# Patient Record
Sex: FEMALE | Race: WHITE | NOT HISPANIC OR LATINO | Employment: OTHER | URBAN - METROPOLITAN AREA
[De-identification: names, ages, dates, MRNs, and addresses within clinical notes are randomized per-mention and may not be internally consistent; named-entity substitution may affect disease eponyms.]

---

## 2023-04-21 LAB
APPEARANCE, URINE: ABNORMAL
BACTERIA, URINE: ABNORMAL /HPF
BILIRUBIN, URINE: NEGATIVE
BLOOD, URINE: ABNORMAL
COLOR, URINE: YELLOW
GLUCOSE, URINE: NEGATIVE MG/DL
KETONES, URINE: NEGATIVE MG/DL
LEUKOCYTE ESTERASE, URINE: ABNORMAL
NITRITE, URINE: NEGATIVE
PH, URINE: 7 (ref 5–8)
PROTEIN, URINE: ABNORMAL MG/DL
RBC, URINE: 31 /HPF (ref 0–5)
SPECIFIC GRAVITY, URINE: 1.01 (ref 1–1.03)
SQUAMOUS EPITHELIAL CELLS, URINE: 6 /HPF
UROBILINOGEN, URINE: <2 MG/DL (ref 0–1.9)
WBC CLUMPS, URINE: ABNORMAL /HPF
WBC, URINE: >182 /HPF (ref 0–5)

## 2023-04-22 LAB — URINE CULTURE: ABNORMAL

## 2023-08-18 ENCOUNTER — HOSPITAL ENCOUNTER (OUTPATIENT)
Dept: DATA CONVERSION | Facility: HOSPITAL | Age: 73
End: 2023-08-18

## 2023-08-18 DIAGNOSIS — M62.81 MUSCLE WEAKNESS (GENERALIZED): ICD-10-CM

## 2023-08-23 ENCOUNTER — HOSPITAL ENCOUNTER (OUTPATIENT)
Dept: DATA CONVERSION | Facility: HOSPITAL | Age: 73
Discharge: HOME | End: 2023-08-23
Payer: MEDICARE

## 2023-08-23 DIAGNOSIS — E11.22 TYPE 2 DIABETES MELLITUS WITH DIABETIC CHRONIC KIDNEY DISEASE (MULTI): ICD-10-CM

## 2023-08-23 DIAGNOSIS — Z99.2 DEPENDENCE ON RENAL DIALYSIS (CMS-HCC): ICD-10-CM

## 2023-08-23 DIAGNOSIS — I12.0 HYPERTENSIVE CHRONIC KIDNEY DISEASE WITH STAGE 5 CHRONIC KIDNEY DISEASE OR END STAGE RENAL DISEASE (MULTI): ICD-10-CM

## 2023-08-23 DIAGNOSIS — N18.6 END STAGE RENAL DISEASE (MULTI): ICD-10-CM

## 2023-08-23 DIAGNOSIS — E11.42 TYPE 2 DIABETES MELLITUS WITH DIABETIC POLYNEUROPATHY (MULTI): ICD-10-CM

## 2023-08-23 LAB
ANION GAP SERPL CALCULATED.3IONS-SCNC: 10 MMOL/L (ref 0–19)
ANTICOAGULANT: ABNORMAL
BASOPHILS # BLD AUTO: 0.06 K/UL (ref 0–0.22)
BASOPHILS NFR BLD AUTO: 1.4 % (ref 0–1)
BUN SERPL-MCNC: 9 MG/DL (ref 8–25)
BUN/CREAT SERPL: 4.1 RATIO (ref 8–21)
CALCIUM SERPL-MCNC: 8.5 MG/DL (ref 8.5–10.4)
CHLORIDE SERPL-SCNC: 98 MMOL/L (ref 97–107)
CO2 SERPL-SCNC: 26 MMOL/L (ref 24–31)
CREAT SERPL-MCNC: 2.2 MG/DL (ref 0.4–1.6)
DEPRECATED RDW RBC AUTO: 58.8 FL (ref 37–54)
DIFFERENTIAL METHOD BLD: ABNORMAL
EOSINOPHIL # BLD AUTO: 0.13 K/UL (ref 0–0.45)
EOSINOPHIL NFR BLD: 3.1 % (ref 0–3)
ERYTHROCYTE [DISTWIDTH] IN BLOOD BY AUTOMATED COUNT: 16.2 % (ref 11.7–15)
GFR SERPL CREATININE-BSD FRML MDRD: 23 ML/MIN/1.73 M2
GLUCOSE BLD STRIP.AUTO-MCNC: 154 MG/DL (ref 65–99)
GLUCOSE SERPL-MCNC: 166 MG/DL (ref 65–99)
HCT VFR BLD AUTO: 36.8 % (ref 36–44)
HGB BLD-MCNC: 11.8 GM/DL (ref 12–15)
IMM GRANULOCYTES # BLD AUTO: 0.01 K/UL (ref 0–0.1)
INR PPP: 1.3 (ref 0.86–1.16)
LYMPHOCYTES # BLD AUTO: 2.26 K/UL (ref 1.2–3.2)
LYMPHOCYTES NFR BLD MANUAL: 54.3 % (ref 20–40)
MCH RBC QN AUTO: 32.2 PG (ref 26–34)
MCHC RBC AUTO-ENTMCNC: 32.1 % (ref 31–37)
MCV RBC AUTO: 100.5 FL (ref 80–100)
MONOCYTES # BLD AUTO: 0.44 K/UL (ref 0–0.8)
MONOCYTES NFR BLD MANUAL: 10.6 % (ref 0–8)
NEUTROPHILS # BLD AUTO: 1.26 K/UL
NEUTROPHILS # BLD AUTO: 1.26 K/UL (ref 1.8–7.7)
NEUTROPHILS.IMMATURE NFR BLD: 0.2 % (ref 0–1)
NEUTS SEG NFR BLD: 30.4 % (ref 50–70)
NRBC BLD-RTO: 1 /100 WBC
PLATELET # BLD AUTO: 136 K/UL (ref 150–450)
PMV BLD AUTO: 9.8 CU (ref 7–12.6)
POTASSIUM SERPL-SCNC: 3.4 MMOL/L (ref 3.4–5.1)
PROTHROMBIN TIME: 13.5 SEC (ref 9.3–12.7)
RBC # BLD AUTO: 3.66 M/UL (ref 4–4.9)
SODIUM SERPL-SCNC: 134 MMOL/L (ref 133–145)
WBC # BLD AUTO: 4.2 K/UL (ref 4.5–11)

## 2023-09-19 LAB
ALBUMIN SERPL-MCNC: 3 GM/DL (ref 3.5–5)
ALBUMIN/GLOB SERPL: 1 RATIO (ref 1.5–3)
ALP BLD-CCNC: 221 U/L (ref 35–125)
ALT SERPL-CCNC: 44 U/L (ref 5–40)
ANION GAP SERPL CALCULATED.3IONS-SCNC: 10 MMOL/L (ref 0–19)
ANTICOAGULANT: ABNORMAL
AST SERPL-CCNC: 148 U/L (ref 5–40)
BASOPHILS # BLD AUTO: 0.04 K/UL (ref 0–0.22)
BASOPHILS NFR BLD AUTO: 1 % (ref 0–1)
BILIRUB DIRECT SERPL-MCNC: 0.6 MG/DL (ref 0–0.2)
BILIRUB INDIRECT SERPL-MCNC: 0.4 MG/DL (ref 0–0.8)
BILIRUB SERPL-MCNC: 1 MG/DL (ref 0.1–1.2)
BUN SERPL-MCNC: 13 MG/DL (ref 8–25)
BUN/CREAT SERPL: 8.1 RATIO (ref 8–21)
CALCIUM SERPL-MCNC: 8.8 MG/DL (ref 8.5–10.4)
CHLORIDE SERPL-SCNC: 99 MMOL/L (ref 97–107)
CO2 SERPL-SCNC: 27 MMOL/L (ref 24–31)
CREAT SERPL-MCNC: 1.6 MG/DL (ref 0.4–1.6)
CRP SERPL-MCNC: 1.7 MG/DL (ref 0–2)
DEPRECATED RDW RBC AUTO: 50.1 FL (ref 37–54)
DIFFERENTIAL METHOD BLD: ABNORMAL
EOSINOPHIL # BLD AUTO: 0.05 K/UL (ref 0–0.45)
EOSINOPHIL NFR BLD: 1.3 % (ref 0–3)
ERYTHROCYTE [DISTWIDTH] IN BLOOD BY AUTOMATED COUNT: 14.5 % (ref 11.7–15)
GFR SERPL CREATININE-BSD FRML MDRD: 34 ML/MIN/1.73 M2
GLOBULIN SER-MCNC: 3.1 G/DL (ref 1.9–3.7)
GLUCOSE SERPL-MCNC: 266 MG/DL (ref 65–99)
HCT VFR BLD AUTO: 39.9 % (ref 36–44)
HGB BLD-MCNC: 13.3 GM/DL (ref 12–15)
HS TROPONIN T DELTA: 2 (ref 0–4)
HS TROPONIN T DELTA: ABNORMAL (ref 0–4)
IMM GRANULOCYTES # BLD AUTO: 0.01 K/UL (ref 0–0.1)
INR PPP: 2.2 (ref 0.86–1.16)
LIPASE SERPL-CCNC: 16 U/L (ref 16–63)
LYMPHOCYTES # BLD AUTO: 0.94 K/UL (ref 1.2–3.2)
LYMPHOCYTES NFR BLD MANUAL: 24.1 % (ref 20–40)
MCH RBC QN AUTO: 31.7 PG (ref 26–34)
MCHC RBC AUTO-ENTMCNC: 33.3 % (ref 31–37)
MCV RBC AUTO: 95 FL (ref 80–100)
MONOCYTES # BLD AUTO: 0.37 K/UL (ref 0–0.8)
MONOCYTES NFR BLD MANUAL: 9.5 % (ref 0–8)
NEUTROPHILS # BLD AUTO: 2.49 K/UL
NEUTROPHILS # BLD AUTO: 2.49 K/UL (ref 1.8–7.7)
NEUTROPHILS.IMMATURE NFR BLD: 0.3 % (ref 0–1)
NEUTS SEG NFR BLD: 63.8 % (ref 50–70)
NRBC BLD-RTO: 0 /100 WBC
PLATELET # BLD AUTO: 144 K/UL (ref 150–450)
PMV BLD AUTO: 9.9 CU (ref 7–12.6)
POTASSIUM SERPL-SCNC: 3.8 MMOL/L (ref 3.4–5.1)
PROT SERPL-MCNC: 6.1 G/DL (ref 5.9–7.9)
PROTHROMBIN TIME: 22.1 SEC (ref 9.3–12.7)
RBC # BLD AUTO: 4.2 M/UL (ref 4–4.9)
SODIUM SERPL-SCNC: 136 MMOL/L (ref 133–145)
TROPONIN T SERPL-MCNC: 92 NG/L
TROPONIN T SERPL-MCNC: 94 NG/L
WBC # BLD AUTO: 3.9 K/UL (ref 4.5–11)

## 2023-09-20 ENCOUNTER — HOSPITAL ENCOUNTER (OUTPATIENT)
Dept: DATA CONVERSION | Facility: HOSPITAL | Age: 73
Discharge: HOME | End: 2023-09-22
Payer: MEDICARE

## 2023-09-20 DIAGNOSIS — R10.84 GENERALIZED ABDOMINAL PAIN: ICD-10-CM

## 2023-09-20 DIAGNOSIS — E87.70 FLUID OVERLOAD, UNSPECIFIED: ICD-10-CM

## 2023-09-20 DIAGNOSIS — Z79.01 LONG TERM (CURRENT) USE OF ANTICOAGULANTS: ICD-10-CM

## 2023-09-20 DIAGNOSIS — Z99.2 DEPENDENCE ON RENAL DIALYSIS (CMS-HCC): ICD-10-CM

## 2023-09-20 DIAGNOSIS — J44.9 CHRONIC OBSTRUCTIVE PULMONARY DISEASE, UNSPECIFIED (MULTI): ICD-10-CM

## 2023-09-20 DIAGNOSIS — Z88.2 ALLERGY STATUS TO SULFONAMIDES: ICD-10-CM

## 2023-09-20 DIAGNOSIS — D63.1 ANEMIA IN CHRONIC KIDNEY DISEASE (CODE): ICD-10-CM

## 2023-09-20 DIAGNOSIS — E11.22 TYPE 2 DIABETES MELLITUS WITH DIABETIC CHRONIC KIDNEY DISEASE (MULTI): ICD-10-CM

## 2023-09-20 DIAGNOSIS — K59.00 CONSTIPATION, UNSPECIFIED: ICD-10-CM

## 2023-09-20 DIAGNOSIS — Z95.5 PRESENCE OF CORONARY ANGIOPLASTY IMPLANT AND GRAFT: ICD-10-CM

## 2023-09-20 DIAGNOSIS — I12.0 HYPERTENSIVE CHRONIC KIDNEY DISEASE WITH STAGE 5 CHRONIC KIDNEY DISEASE OR END STAGE RENAL DISEASE (MULTI): ICD-10-CM

## 2023-09-20 DIAGNOSIS — N18.6 END STAGE RENAL DISEASE (MULTI): ICD-10-CM

## 2023-09-20 DIAGNOSIS — I25.10 ATHEROSCLEROTIC HEART DISEASE OF NATIVE CORONARY ARTERY WITHOUT ANGINA PECTORIS: ICD-10-CM

## 2023-09-20 DIAGNOSIS — R33.8 OTHER RETENTION OF URINE: ICD-10-CM

## 2023-09-20 DIAGNOSIS — R79.89 OTHER SPECIFIED ABNORMAL FINDINGS OF BLOOD CHEMISTRY: ICD-10-CM

## 2023-09-20 DIAGNOSIS — Z79.899 OTHER LONG TERM (CURRENT) DRUG THERAPY: ICD-10-CM

## 2023-09-20 DIAGNOSIS — R07.9 CHEST PAIN, UNSPECIFIED: ICD-10-CM

## 2023-09-20 LAB
ALBUMIN SERPL-MCNC: 2.7 GM/DL (ref 3.5–5)
ALBUMIN/GLOB SERPL: 0.9 RATIO (ref 1.5–3)
ALP BLD-CCNC: 266 U/L (ref 35–125)
ALT SERPL-CCNC: 363 U/L (ref 5–40)
ANION GAP SERPL CALCULATED.3IONS-SCNC: 11 MMOL/L (ref 0–19)
AST SERPL-CCNC: 777 U/L (ref 5–40)
BILIRUB SERPL-MCNC: 1 MG/DL (ref 0.1–1.2)
BUN SERPL-MCNC: 20 MG/DL (ref 8–25)
BUN/CREAT SERPL: 9.1 RATIO (ref 8–21)
CALCIUM SERPL-MCNC: 8.7 MG/DL (ref 8.5–10.4)
CHLORIDE SERPL-SCNC: 100 MMOL/L (ref 97–107)
CO2 SERPL-SCNC: 23 MMOL/L (ref 24–31)
CREAT SERPL-MCNC: 2.2 MG/DL (ref 0.4–1.6)
GFR SERPL CREATININE-BSD FRML MDRD: 23 ML/MIN/1.73 M2
GLOBULIN SER-MCNC: 2.9 G/DL (ref 1.9–3.7)
GLUCOSE BLD STRIP.AUTO-MCNC: 140 MG/DL (ref 65–99)
GLUCOSE BLD STRIP.AUTO-MCNC: 191 MG/DL (ref 65–99)
GLUCOSE BLD STRIP.AUTO-MCNC: 198 MG/DL (ref 65–99)
GLUCOSE SERPL-MCNC: 227 MG/DL (ref 65–99)
HAV IGM SER QL: NORMAL
HBA1C MFR BLD: 5.5 % (ref 4–6)
HBV CORE IGM SER QL: NORMAL
HBV SURFACE AG SER QL: NORMAL
HCV AB SER QL: NORMAL
POTASSIUM SERPL-SCNC: 4.1 MMOL/L (ref 3.4–5.1)
PROT SERPL-MCNC: 5.6 G/DL (ref 5.9–7.9)
SODIUM SERPL-SCNC: 134 MMOL/L (ref 133–145)

## 2023-09-21 LAB
ALBUMIN SERPL-MCNC: 2.2 GM/DL (ref 3.5–5)
ALBUMIN/GLOB SERPL: 1 RATIO (ref 1.5–3)
ALP BLD-CCNC: 190 U/L (ref 35–125)
ALT SERPL-CCNC: 129 U/L (ref 5–40)
ANION GAP SERPL CALCULATED.3IONS-SCNC: 10 MMOL/L (ref 0–19)
ANTICOAGULANT: ABNORMAL
AST SERPL-CCNC: 95 U/L (ref 5–40)
BASOPHILS # BLD AUTO: 0.06 K/UL (ref 0–0.22)
BASOPHILS NFR BLD AUTO: 1.5 % (ref 0–1)
BILIRUB SERPL-MCNC: 0.4 MG/DL (ref 0.1–1.2)
BUN SERPL-MCNC: 33 MG/DL (ref 8–25)
BUN/CREAT SERPL: 11.8 RATIO (ref 8–21)
CALCIUM SERPL-MCNC: 7 MG/DL (ref 8.5–10.4)
CHLORIDE SERPL-SCNC: 105 MMOL/L (ref 97–107)
CO2 SERPL-SCNC: 20 MMOL/L (ref 24–31)
CREAT SERPL-MCNC: 2.8 MG/DL (ref 0.4–1.6)
DEPRECATED RDW RBC AUTO: 53.9 FL (ref 37–54)
DIFFERENTIAL METHOD BLD: ABNORMAL
EOSINOPHIL # BLD AUTO: 0.08 K/UL (ref 0–0.45)
EOSINOPHIL NFR BLD: 2 % (ref 0–3)
ERYTHROCYTE [DISTWIDTH] IN BLOOD BY AUTOMATED COUNT: 14.6 % (ref 11.7–15)
GFR SERPL CREATININE-BSD FRML MDRD: 17 ML/MIN/1.73 M2
GLOBULIN SER-MCNC: 2.3 G/DL (ref 1.9–3.7)
GLUCOSE BLD STRIP.AUTO-MCNC: 106 MG/DL (ref 65–99)
GLUCOSE BLD STRIP.AUTO-MCNC: 139 MG/DL (ref 65–99)
GLUCOSE BLD STRIP.AUTO-MCNC: 192 MG/DL (ref 65–99)
GLUCOSE BLD STRIP.AUTO-MCNC: 271 MG/DL (ref 65–99)
GLUCOSE SERPL-MCNC: 137 MG/DL (ref 65–99)
HCT VFR BLD AUTO: 34.9 % (ref 36–44)
HGB BLD-MCNC: 11.5 GM/DL (ref 12–15)
IMM GRANULOCYTES # BLD AUTO: 0.02 K/UL (ref 0–0.1)
INR PPP: 2.5 (ref 0.86–1.16)
LYMPHOCYTES # BLD AUTO: 2.18 K/UL (ref 1.2–3.2)
LYMPHOCYTES NFR BLD MANUAL: 53.7 % (ref 20–40)
MAGNESIUM SERPL-MCNC: 1.7 MG/DL (ref 1.6–3.1)
MCH RBC QN AUTO: 32.7 PG (ref 26–34)
MCHC RBC AUTO-ENTMCNC: 33 % (ref 31–37)
MCV RBC AUTO: 99.1 FL (ref 80–100)
MONOCYTES # BLD AUTO: 0.38 K/UL (ref 0–0.8)
MONOCYTES NFR BLD MANUAL: 9.4 % (ref 0–8)
NEUTROPHILS # BLD AUTO: 1.34 K/UL
NEUTROPHILS # BLD AUTO: 1.34 K/UL (ref 1.8–7.7)
NEUTROPHILS.IMMATURE NFR BLD: 0.5 % (ref 0–1)
NEUTS SEG NFR BLD: 32.9 % (ref 50–70)
NRBC BLD-RTO: 0 /100 WBC
PLATELET # BLD AUTO: 128 K/UL (ref 150–450)
PMV BLD AUTO: 10.4 CU (ref 7–12.6)
POTASSIUM SERPL-SCNC: 3.3 MMOL/L (ref 3.4–5.1)
PROT SERPL-MCNC: 4.5 G/DL (ref 5.9–7.9)
PROTHROMBIN TIME: 25.4 SEC (ref 9.3–12.7)
RBC # BLD AUTO: 3.52 M/UL (ref 4–4.9)
SODIUM SERPL-SCNC: 135 MMOL/L (ref 133–145)
WBC # BLD AUTO: 4.1 K/UL (ref 4.5–11)

## 2023-09-22 LAB — GLUCOSE BLD STRIP.AUTO-MCNC: 185 MG/DL (ref 65–99)

## 2023-09-27 ENCOUNTER — HOSPITAL ENCOUNTER (OUTPATIENT)
Dept: DATA CONVERSION | Facility: HOSPITAL | Age: 73
End: 2023-09-27
Payer: MEDICARE

## 2023-10-08 PROBLEM — M79.7 FIBROMYALGIA: Status: ACTIVE | Noted: 2023-10-08

## 2023-10-08 PROBLEM — E03.9 HYPOTHYROID: Status: ACTIVE | Noted: 2023-10-08

## 2023-10-08 PROBLEM — I25.10 ATHEROSCLEROSIS OF CORONARY ARTERY: Status: ACTIVE | Noted: 2023-10-08

## 2023-10-08 PROBLEM — M19.131: Status: ACTIVE | Noted: 2023-10-08

## 2023-10-08 PROBLEM — H90.3 BILATERAL SENSORINEURAL HEARING LOSS: Status: ACTIVE | Noted: 2023-10-08

## 2023-10-08 PROBLEM — E66.9 OBESITY: Status: ACTIVE | Noted: 2023-10-08

## 2023-10-08 PROBLEM — R60.9 CHRONIC EDEMA: Status: ACTIVE | Noted: 2023-10-08

## 2023-10-08 PROBLEM — D63.8 ANEMIA OF CHRONIC DISEASE: Status: ACTIVE | Noted: 2023-10-08

## 2023-10-08 PROBLEM — M19.079 ANKLE ARTHRITIS: Status: ACTIVE | Noted: 2023-10-08

## 2023-10-08 PROBLEM — E87.5 HYPERKALEMIA: Status: ACTIVE | Noted: 2023-10-08

## 2023-10-08 PROBLEM — L90.0 LICHEN SCLEROSUS: Status: ACTIVE | Noted: 2023-10-08

## 2023-10-08 PROBLEM — M25.531 RIGHT WRIST PAIN: Status: ACTIVE | Noted: 2023-10-08

## 2023-10-08 PROBLEM — K92.0 HEMATEMESIS: Status: ACTIVE | Noted: 2023-10-08

## 2023-10-08 PROBLEM — R35.1 NOCTURIA: Status: ACTIVE | Noted: 2023-10-08

## 2023-10-08 PROBLEM — G43.109 COMPLICATED MIGRAINE: Status: ACTIVE | Noted: 2023-10-08

## 2023-10-08 PROBLEM — M54.50 LUMBAR PAIN: Status: ACTIVE | Noted: 2023-10-08

## 2023-10-08 PROBLEM — Z85.72 HISTORY OF NON-HODGKIN'S LYMPHOMA: Status: ACTIVE | Noted: 2023-10-08

## 2023-10-08 PROBLEM — Z95.5 HISTORY OF CORONARY ARTERY STENT PLACEMENT: Status: ACTIVE | Noted: 2023-10-08

## 2023-10-08 PROBLEM — R10.2 FEMALE PELVIC PAIN: Status: ACTIVE | Noted: 2023-10-08

## 2023-10-08 PROBLEM — R55 SYNCOPE: Status: ACTIVE | Noted: 2023-10-08

## 2023-10-08 PROBLEM — I10 BENIGN ESSENTIAL HYPERTENSION: Status: ACTIVE | Noted: 2023-10-08

## 2023-10-08 PROBLEM — I48.20 CHRONIC ATRIAL FIBRILLATION (MULTI): Status: ACTIVE | Noted: 2023-10-08

## 2023-10-08 PROBLEM — R41.82 ALTERED MENTAL STATUS: Status: ACTIVE | Noted: 2023-10-08

## 2023-10-08 PROBLEM — E87.8 ELECTROLYTE IMBALANCE: Status: ACTIVE | Noted: 2023-10-08

## 2023-10-08 PROBLEM — R31.9 HEMATURIA: Status: ACTIVE | Noted: 2023-10-08

## 2023-10-08 PROBLEM — R10.13 EPIGASTRIC PAIN: Status: ACTIVE | Noted: 2023-10-08

## 2023-10-08 PROBLEM — I16.0 HYPERTENSIVE URGENCY: Status: ACTIVE | Noted: 2023-10-08

## 2023-10-08 PROBLEM — E61.1 IRON DEFICIENCY: Status: ACTIVE | Noted: 2023-10-08

## 2023-10-08 PROBLEM — M31.6 TEMPORAL ARTERITIS (MULTI): Status: ACTIVE | Noted: 2023-10-08

## 2023-10-08 PROBLEM — S80.00XA CONTUSION OF KNEE: Status: ACTIVE | Noted: 2023-10-08

## 2023-10-08 PROBLEM — S82.853A CLOSED TRIMALLEOLAR FRACTURE: Status: ACTIVE | Noted: 2023-10-08

## 2023-10-08 PROBLEM — R06.00 DYSPNEA: Status: ACTIVE | Noted: 2023-10-08

## 2023-10-08 PROBLEM — N95.2 VAGINAL ATROPHY: Status: ACTIVE | Noted: 2023-10-08

## 2023-10-08 PROBLEM — G40.909 SEIZURE DISORDER (MULTI): Status: ACTIVE | Noted: 2023-10-08

## 2023-10-08 PROBLEM — R07.89 CHEST DISCOMFORT: Status: ACTIVE | Noted: 2023-10-08

## 2023-10-08 PROBLEM — K52.9 COLITIS: Status: ACTIVE | Noted: 2023-10-08

## 2023-10-08 PROBLEM — S89.90XA INJURY OF LOWER EXTREMITY: Status: ACTIVE | Noted: 2023-10-08

## 2023-10-08 PROBLEM — E78.5 HYPERLIPIDEMIA: Status: ACTIVE | Noted: 2023-10-08

## 2023-10-08 PROBLEM — R39.9 LOWER URINARY TRACT SYMPTOMS (LUTS): Status: ACTIVE | Noted: 2023-10-08

## 2023-10-08 PROBLEM — R60.0 LOWER LEG EDEMA: Status: ACTIVE | Noted: 2023-10-08

## 2023-10-08 PROBLEM — E43 SEVERE PROTEIN-CALORIE MALNUTRITION (GOMEZ: LESS THAN 60% OF STANDARD WEIGHT) (MULTI): Status: ACTIVE | Noted: 2023-10-08

## 2023-10-08 PROBLEM — E87.1 HYPONATREMIA: Status: ACTIVE | Noted: 2023-10-08

## 2023-10-08 PROBLEM — I50.9 HEART FAILURE (MULTI): Status: ACTIVE | Noted: 2023-10-08

## 2023-10-08 PROBLEM — Z86.718 HISTORY OF DEEP VEIN THROMBOSIS: Status: ACTIVE | Noted: 2023-10-08

## 2023-10-08 PROBLEM — R94.31 ELECTROCARDIOGRAM ABNORMAL: Status: ACTIVE | Noted: 2023-10-08

## 2023-10-08 PROBLEM — I20.9 ANGINA PECTORIS (CMS-HCC): Status: ACTIVE | Noted: 2023-10-08

## 2023-10-08 PROBLEM — I63.9 CEREBROVASCULAR ACCIDENT (CVA) (MULTI): Status: ACTIVE | Noted: 2023-10-08

## 2023-10-08 PROBLEM — E83.42 HYPOMAGNESEMIA: Status: ACTIVE | Noted: 2023-10-08

## 2023-10-08 PROBLEM — R53.1 ASTHENIA: Status: ACTIVE | Noted: 2023-10-08

## 2023-10-08 PROBLEM — N18.5 CHRONIC KIDNEY DISEASE, STAGE 5 (MULTI): Status: ACTIVE | Noted: 2023-10-08

## 2023-10-08 PROBLEM — K21.9 GASTROESOPHAGEAL REFLUX DISEASE: Status: ACTIVE | Noted: 2023-10-08

## 2023-10-08 PROBLEM — N81.89 PELVIC FLOOR WEAKNESS: Status: ACTIVE | Noted: 2023-10-08

## 2023-10-08 PROBLEM — E11.22: Status: ACTIVE | Noted: 2023-10-08

## 2023-10-08 RX ORDER — GABAPENTIN 100 MG/1
100 CAPSULE ORAL 2 TIMES DAILY
COMMUNITY

## 2023-10-08 RX ORDER — ACETAMINOPHEN 325 MG/1
650 TABLET ORAL EVERY 6 HOURS PRN
COMMUNITY

## 2023-10-08 RX ORDER — NYSTATIN 100000 U/G
1 CREAM TOPICAL 2 TIMES DAILY
COMMUNITY
End: 2024-01-15 | Stop reason: SDUPTHER

## 2023-10-08 RX ORDER — FUROSEMIDE 40 MG/1
40 TABLET ORAL DAILY
COMMUNITY

## 2023-10-08 RX ORDER — EVOLOCUMAB 140 MG/ML
140 INJECTION, SOLUTION SUBCUTANEOUS
COMMUNITY
Start: 2021-09-14

## 2023-10-08 RX ORDER — FLUCONAZOLE 150 MG/1
150 TABLET ORAL ONCE
COMMUNITY
Start: 2022-02-10 | End: 2023-10-16 | Stop reason: ALTCHOICE

## 2023-10-08 RX ORDER — WARFARIN 2.5 MG/1
2.5 TABLET ORAL
COMMUNITY

## 2023-10-08 RX ORDER — CARVEDILOL 25 MG/1
50 TABLET ORAL
COMMUNITY

## 2023-10-08 RX ORDER — WARFARIN SODIUM 5 MG/1
5 TABLET ORAL
COMMUNITY

## 2023-10-08 RX ORDER — KETOCONAZOLE 20 MG/G
CREAM TOPICAL 2 TIMES DAILY
COMMUNITY
Start: 2021-02-17

## 2023-10-08 RX ORDER — LEVETIRACETAM 500 MG/1
TABLET ORAL
COMMUNITY
Start: 2021-09-21

## 2023-10-08 RX ORDER — INSULIN LISPRO 100 [IU]/ML
INJECTION, SOLUTION INTRAVENOUS; SUBCUTANEOUS
COMMUNITY
Start: 2020-12-28

## 2023-10-08 RX ORDER — BUTALBITAL, ACETAMINOPHEN AND CAFFEINE 50; 325; 40 MG/1; MG/1; MG/1
TABLET ORAL
COMMUNITY
Start: 2021-08-17

## 2023-10-08 RX ORDER — HYDRALAZINE HYDROCHLORIDE 10 MG/1
30 TABLET, FILM COATED ORAL 2 TIMES DAILY
COMMUNITY

## 2023-10-08 RX ORDER — NITROFURANTOIN 25; 75 MG/1; MG/1
100 CAPSULE ORAL EVERY 12 HOURS
COMMUNITY
Start: 2017-05-07 | End: 2023-10-16 | Stop reason: ALTCHOICE

## 2023-10-08 RX ORDER — CLOBETASOL PROPIONATE 0.5 MG/G
CREAM TOPICAL
COMMUNITY
Start: 2021-09-16 | End: 2023-10-16 | Stop reason: SDUPTHER

## 2023-10-08 RX ORDER — ATORVASTATIN CALCIUM 40 MG/1
40 TABLET, FILM COATED ORAL DAILY
COMMUNITY

## 2023-10-08 RX ORDER — FUROSEMIDE 20 MG/1
20 TABLET ORAL DAILY
COMMUNITY

## 2023-10-08 RX ORDER — NITROGLYCERIN 80 MG/1
PATCH TRANSDERMAL
COMMUNITY
Start: 2021-05-28

## 2023-10-08 RX ORDER — INSULIN GLARGINE 100 [IU]/ML
10 INJECTION, SOLUTION SUBCUTANEOUS NIGHTLY
COMMUNITY

## 2023-10-08 RX ORDER — ISOSORBIDE MONONITRATE 30 MG/1
30 TABLET, EXTENDED RELEASE ORAL DAILY
COMMUNITY
Start: 2022-07-19

## 2023-10-08 RX ORDER — NIACIN 50 MG
50 TABLET ORAL DAILY
COMMUNITY

## 2023-10-08 RX ORDER — LAMOTRIGINE 100 MG/1
100 TABLET ORAL 2 TIMES DAILY
COMMUNITY
Start: 2021-08-17

## 2023-10-08 RX ORDER — TORSEMIDE 100 MG/1
0.5 TABLET ORAL DAILY
COMMUNITY
Start: 2022-01-12

## 2023-10-08 RX ORDER — PANTOPRAZOLE SODIUM 40 MG/1
40 TABLET, DELAYED RELEASE ORAL DAILY
COMMUNITY

## 2023-10-08 RX ORDER — HYDRALAZINE HYDROCHLORIDE 50 MG/1
2 TABLET, FILM COATED ORAL 3 TIMES DAILY
COMMUNITY

## 2023-10-08 RX ORDER — HYDRALAZINE HYDROCHLORIDE 25 MG/1
TABLET, FILM COATED ORAL
COMMUNITY
Start: 2021-02-25

## 2023-10-08 RX ORDER — LEVOTHYROXINE SODIUM 75 UG/1
75 TABLET ORAL
COMMUNITY
Start: 2012-08-17

## 2023-10-08 RX ORDER — ESTRADIOL 10 UG/1
10 INSERT VAGINAL 3 TIMES WEEKLY
COMMUNITY
Start: 2022-03-04 | End: 2024-03-27 | Stop reason: SDUPTHER

## 2023-10-08 RX ORDER — CARVEDILOL 12.5 MG/1
12.5 TABLET ORAL 2 TIMES DAILY
COMMUNITY
Start: 2021-07-14

## 2023-10-08 RX ORDER — OXYCODONE AND ACETAMINOPHEN 5; 325 MG/1; MG/1
1 TABLET ORAL EVERY 6 HOURS PRN
COMMUNITY

## 2023-10-08 RX ORDER — CEFADROXIL 500 MG/1
CAPSULE ORAL
COMMUNITY
Start: 2021-10-11

## 2023-10-08 RX ORDER — METOPROLOL TARTRATE 25 MG/1
25 TABLET, FILM COATED ORAL 2 TIMES DAILY
COMMUNITY

## 2023-10-11 ENCOUNTER — DOCUMENTATION (OUTPATIENT)
Dept: PHYSICAL THERAPY | Facility: CLINIC | Age: 73
End: 2023-10-11
Payer: MEDICARE

## 2023-10-13 ENCOUNTER — APPOINTMENT (OUTPATIENT)
Dept: AUDIOLOGY | Facility: CLINIC | Age: 73
End: 2023-10-13

## 2023-10-20 ENCOUNTER — APPOINTMENT (OUTPATIENT)
Dept: RADIOLOGY | Facility: HOSPITAL | Age: 73
End: 2023-10-20
Payer: MEDICARE

## 2023-10-20 ENCOUNTER — APPOINTMENT (OUTPATIENT)
Dept: AUDIOLOGY | Facility: CLINIC | Age: 73
End: 2023-10-20

## 2023-10-27 ENCOUNTER — CLINICAL SUPPORT (OUTPATIENT)
Dept: AUDIOLOGY | Facility: CLINIC | Age: 73
End: 2023-10-27

## 2023-10-27 DIAGNOSIS — H90.3 SENSORINEURAL HEARING LOSS (SNHL) OF BOTH EARS: Primary | ICD-10-CM

## 2023-10-27 PROCEDURE — HRANC PR HEARING AID NO CHARGE: Performed by: AUDIOLOGIST

## 2023-10-27 NOTE — PROGRESS NOTES
Hearing Aid Evaluation  Time of Appointment:  Chief Complaint   Patient presents with    Hearing Loss       This patient was seen for an HAE. The patient has a benefit through [facility].    Demonstrated Phonak Audeo hearing aids in the office today.  The patient was pleased with the sound and fit.    Order placed today: 2 PHONAK AUDEO M 30 13 IN P3 COLOR , #1 LENGTH AND SMALL VENTED DOMES TO START. PATIENT HAS SMALL CANALS    The patient paid NO CHARGE for today's visit. SHE HAS WORN [employer] IN PAST BUT DID NOT WEAR THEM MUCH. SHE IS USING A WHEELCHAIR AND KNOWS TO CALL FOR THE FITTING AND FOLLOW UP APPT.    Return for hearing aid fitting that was scheduled today. PATIENT WILL CALL [facility] FOR AN APPT.     HEARING AID FITTING    Time of Appointment:

## 2023-11-08 ENCOUNTER — DOCUMENTATION (OUTPATIENT)
Dept: AUDIOLOGY | Facility: CLINIC | Age: 73
End: 2023-11-08
Payer: MEDICARE

## 2023-11-08 NOTE — PROGRESS NOTES
Patient called in 11/6 and cancelled hearing aid order. Hearing aids were received from [facility] 11/6 and will be returned to [employer] and on [facility] portal.

## 2023-11-21 ENCOUNTER — DOCUMENTATION (OUTPATIENT)
Dept: PHYSICAL THERAPY | Facility: CLINIC | Age: 73
End: 2023-11-21
Payer: MEDICARE

## 2023-11-21 NOTE — PROGRESS NOTES
Physical Therapy    Discharge Summary    Name: Eli Zavala  MRN: [id number]  : 1950  Date: 23    Discharge Summary: PT    Discharge Information: Date of discharge 23      Rehab Discharge Reason: Achieved all and/or the most significant goals(s)

## 2024-01-02 NOTE — PROGRESS NOTES
"Subjective   Patient ID: Eli Zavala is a 73 y.o. female who presents for vaginal irritation of micturition.    HPI  72-year-old with significant vaginal atrophy and vaginal erythema, concerns for lichen sclerosis, pelvic floor weakness and pain, nocturia and history of daytime urgency, chronic kidney disease with microscopic hematuria now on 3 times a week hemodialysis.     The patient presents with complaints of  vaginal burning , she has been utilizing Yuvafem tablets once a week with 6-7 hours relief. She is also utilizing clobetasol cream twice weekly. She is noting burning and when she voids it burn and states \"like her vagina is on fire\".     She denies any vaginal bleeding or discharge.     She denies any bowel related complaints, no fecal or flatal incontinence.    She has no other complaints.    From Previous note  This visit was performed through telemedicine  72-year-old with significant vaginal atrophy and vaginal erythema, concerns for lichen sclerosis, pelvic floor weakness and pain, nocturia and daytime urgency, chronic kidney disease with microscopic hematuria now on 3 times a week hemodialysis.    The patient has not started utilizing the Gemtesa therapy, she continues to note a burning sensation even with a little amount of urine associated with burning micturition. She notes 1-2 episodes of nocturia and once during the day.  She utilizes oil around her vagina which improves the burning.  She states that the burning is not bladder but rather external vaginal tissue concerns.    She denies any vaginal bleeding or discharge. She has been utilizing the Yuvafem tablets.     She denies any bowel related complaints, no fecal or flatal incontinence.    She has no other complaints.      From Previous note   72-year-old with significant vaginal atrophy and vaginal erythema, concerns for lichen sclerosis, pelvic floor weakness and pain, nocturia and daytime urgency, chronic kidney disease with microscopic " hematuria.     The patient was last seen in April 2023. She is on hemodialysis thrice a week for her kidney failure, she has small volume voids. She does have a sensation of bladder fullness but has difficulty emptying her bladder, she notes burning when she has this sensation of fullness. She denies any burning micturition or dysuria. She denies any UTI symptoms.     She denies any vaginal complaints, no abnormal vaginal bleeding or discharge.     She denies any bowel related complaints, no fecal or flatal incontinence.     She has no other complaints.     From previous note   72-year-old with significant vaginal atrophy and vaginal erythema, concerns for lichen sclerosis, pelvic floor weakness and pain, nocturia and daytime urgency, chronic kidney disease with microscopic hematuria.     The patient is not utilizing the vaginal estrogen tablets, however she did utilize the clobetasol therapy daily for 2 weeks and continues to utilize it twice a week thereafter. She denies any vaginal itching or irritation. She denies any abnormal vaginal bleeding or discharge.      She denies any urinary frequency, but reports of burning micturition and dysuria for the past 1 month. She denies any other UTI like symptoms. It was discussed that she cannot utilize AZO for her symptoms due to her poor kidney function.     She denies any bowel related complaints, no fecal or flatal incontinence.     She has no other complaints     From previous note  72-year-old with significant vaginal atrophy and vaginal erythema, concerns for lichen sclerosis, pelvic floor weakness and pain, nocturia and daytime urgency, chronic kidney disease with microscopic hematuria.     The patient was last seen in April 2022. She states she has seen recurrence of her vaginal symptoms for the past 2 weeks. She complaints of vaginal itching and irritation. She has not used the clobetasol cream for the past 6 months.      She denies any lower urinary tract  "complaints.     She denies any bowel related complaints, no fecal or flatal incontinence.     She has no other complaints.        From previous note  71-year-old with significant vaginal atrophy and vaginal erythema, pelvic floor weakness and pain, nocturia and mild daytime urgency, and chronic kidney disease with microscopic hematuria.     The patient is noted significant improvements in her vaginal irritation complaints. She is utilizing nystatin ointment and powder. She did not take her fluconazole as the \"nystatin was working\". She otherwise denies any abnormal vaginal bleeding or discharge. She is not utilizing her vaginal estrogen therapy.     She feels her bladder is \"working better\". She never utilized her Gemtesa therapy. She does note 2-3 episodes of nocturia which is not bothersome to her and notes daytime frequency roughly every 3 hours. She denies urge related incontinence. She does not desire any therapy for her bladder at this time.     We have previously discussed her voiding diary. This demonstrates a bladder capacity of 300 cc. There is no evidence of nocturnal polyuria. The patient does have 3 episodes of nocturia at low volumes roughly 100 cc. She is noted to have daytime frequency every hour to 2 hours. The patient is drinking roughly 1 L of water daily.     She has no other complaints.        From previous note  70-year-old presenting as referral from Dr. Yun with complaints of urinary frequency, nocturia, and vaginal and bladder pain.     Patient was followed by a urologist roughly 2 years ago and started on estrogen cream at that time. She is used that sporadically but is no longer using it at this time. She notes a roughly 4-month history of worsening urinary frequency and nocturia complaints with bladder pain. She notes pain \"all the time\" and significant irritation to the vaginal tissues. She denies any abnormal discharge or bleeding. She notes daytime frequency roughly every 3 hours. " However she states that she is up every hour at night with low volumes and pain. She feels that she does not empty her bladder appropriately and has to strain significantly which sometimes causes a bowel movement. She denies enuresis. She denies any urinary leakage.     She did see infectious disease 3 months ago and was started on doxycycline for unclear reasons. She is no longer taking this medication.     She does have episodic constipation and takes MiraLAX for this. She otherwise denies any fecal or flatal incontinence.     She is not sexually active. She denies any bulge complaints.     She has no other complaints   Review of Systems  Constitutional: No fever, No chills and No fatigue.   Eyes: No vision problems and No dryness of the eyes.   ENT: No dry mouth, No hearing loss and No nosebleeds.   Cardiovascular: No chest pain, No palpitations and No orthopnea.   Respiratory: No shortness of breath, No cough and No wheezing.   Gastrointestinal: No abdominal pain, No constipation, No nausea, No diarrhea, No vomiting and No melena.   Genitourinary: As noted in HPI.   Musculoskeletal: No back pain, No myalgias, No muscle weakness, No joint swelling and No leg edema.   Integumentary: No rashes, No skin lesion and No itching.   Neurological: No headache, No numbness and No dizziness.   Psychiatric: No sleep disturbances, No anxiety and No depression.   Endocrine: No hot flashes, No loss of hair and No hirsutism.   Hematologic/Lymphatic: No swollen glands, No tendency for easy bleeding and No tendency for easy bruising.   All other systems have been reviewed and are negative for complaint.        Objective   Physical Exam    PHYSICAL EXAMINATION:  No LMP recorded.  There is no height or weight on file to calculate BMI.  There were no vitals taken for this visit.  General Appearance: well appearing  Neuro: Alert and oriented   HEENT: mucous membranes moist, neck supple  Resp: No respiratory distress, normal work of  "breathing  MSK: normal range of motion, gait appropriate  Wheelchair-bound        Assessment/Plan      72-year-old with significant vaginal atrophy and vaginal erythema, concerns for lichen sclerosis, pelvic floor weakness and pain, nocturia and history of daytime urgency, chronic kidney disease with microscopic hematuria now on 3 times a week hemodialysis.     1. We again discussed the patient's vaginal complaints. She has had significant irritation associated with her vaginal estrogen cream. She appears to be having excellent results with her twice weekly Vagifem tablets. She is also utilizing vaginal olive oil to help with irritative complaints with success. She will continue her nystatin cream and ointment as necessary.  She will also continue her clobetasol therapy twice a week moving forward.     2. We again discussed the patient's lower urinary tract complaints. She has noted significant decreased urination associated with her recent hemodialysis but does note bladder \"burning\" with fullness. She unfortunately has allergies to sulfa and fluoroquinolones and has poor kidney function. We have previously discussed her voiding diary which demonstrated a low capacity hypersensitive bladder with no evidence of nocturnal polyuria. We have previously discussed the [facility] care pathway including first, second, third line therapies. She unfortunately was intolerant of Myrbetriq as this made her feel unwell. She took Gemtesa once and felt this caused urinary retention but this was during the time of her UTI.  We discussed a trial of phenazopyridine.  Given her hemodialysis, this will be a safe option for her.    3. The patient has been noted to have microscopic hematuria in the past that is likely related to her significant vaginal atrophy and irritation complaints. She denies any gross hematuria complaints. She is presently on hemodialysis.     4. Patient will follow-up in 1 week using her phenazopyridine to discuss " her lower urinary tract symptoms and pelvic and bladder burning complaints.     DC Rizzo MD        Scribe Attestation  By signing my name below, I, Shiv Maher attest that this documentation has been prepared under the direction and in the presence of Hola Rizzo MD. All medical record entries made by the Scribe were at my direction or personally dictated by me. I have reviewed the chart and agree that the record accurately reflects my personal performance of the history, physical exam, discussion and plan.

## 2024-01-05 ENCOUNTER — OFFICE VISIT (OUTPATIENT)
Dept: UROLOGY | Facility: CLINIC | Age: 74
End: 2024-01-05
Payer: MEDICARE

## 2024-01-05 VITALS — DIASTOLIC BLOOD PRESSURE: 63 MMHG | HEART RATE: 68 BPM | SYSTOLIC BLOOD PRESSURE: 164 MMHG

## 2024-01-05 DIAGNOSIS — Z99.2 HEMODIALYSIS PATIENT (CMS-HCC): ICD-10-CM

## 2024-01-05 DIAGNOSIS — R39.89 BLADDER PAIN: ICD-10-CM

## 2024-01-05 DIAGNOSIS — R10.2 FEMALE PELVIC PAIN: ICD-10-CM

## 2024-01-05 DIAGNOSIS — N95.2 VAGINAL ATROPHY: ICD-10-CM

## 2024-01-05 DIAGNOSIS — L90.0 LICHEN SCLEROSUS: ICD-10-CM

## 2024-01-05 DIAGNOSIS — N76.1 CHRONIC VAGINITIS: Primary | ICD-10-CM

## 2024-01-05 PROCEDURE — 1036F TOBACCO NON-USER: CPT | Performed by: OBSTETRICS & GYNECOLOGY

## 2024-01-05 PROCEDURE — 99213 OFFICE O/P EST LOW 20 MIN: CPT | Performed by: OBSTETRICS & GYNECOLOGY

## 2024-01-05 PROCEDURE — 3077F SYST BP >= 140 MM HG: CPT | Performed by: OBSTETRICS & GYNECOLOGY

## 2024-01-05 PROCEDURE — 3078F DIAST BP <80 MM HG: CPT | Performed by: OBSTETRICS & GYNECOLOGY

## 2024-01-05 PROCEDURE — 3066F NEPHROPATHY DOC TX: CPT | Performed by: OBSTETRICS & GYNECOLOGY

## 2024-01-05 RX ORDER — PHENAZOPYRIDINE HYDROCHLORIDE 100 MG/1
100 TABLET, FILM COATED ORAL DAILY
Qty: 20 TABLET | Refills: 0 | Status: SHIPPED | OUTPATIENT
Start: 2024-01-05 | End: 2024-01-15 | Stop reason: SDUPTHER

## 2024-01-05 NOTE — PATIENT INSTRUCTIONS
Please follow-up in 1 week virtually to discuss her lower urinary tract symptoms and burning.      Please use your phenazopyridine that I have just called into her pharmacy daily moving forward.  This will cause your urine to turn orange.    [phone]

## 2024-02-23 ENCOUNTER — APPOINTMENT (OUTPATIENT)
Dept: UROLOGY | Facility: CLINIC | Age: 74
End: 2024-02-23
Payer: MEDICARE

## 2024-02-28 NOTE — PROGRESS NOTES
"Subjective   Patient ID: Eli Zavala is a 73 y.o. female who presents for vaginal irritation of micturition.    HPI  72-year-old with significant vaginal atrophy and vaginal erythema, concerns for lichen sclerosis, pelvic floor weakness and pain, nocturia and history of daytime urgency, chronic kidney disease with microscopic hematuria now on 3 times a week hemodialysis.    She reports burning today that is only present when she feels the urge to void. This is localized to the outside of her vagina and not in the bladder. She adds that she uses Yuvafem twice a week this has been helping. She has been using olive oil as well but this does not help.  She does continue her clobetasol 2-3 times weekly as well.  She denies any abnormal vaginal discharge.    She is continuing to follow-up with hemodialysis and notes very rare urinary symptoms but significant pain while she urinates \"a few drops\".    She denies any bowel related complaints, no fecal or flatal incontinence.    She has no other complaints.      From Previous note  This visit was performed through telemedicine  72-year-old with significant vaginal atrophy and vaginal erythema, concerns for lichen sclerosis, pelvic floor weakness and pain, nocturia and history of daytime urgency, chronic kidney disease with microscopic hematuria now on 3 times a week hemodialysis.    The patient utilized phenazopyridine once a day and it helped her bladder burning and discomfort complaints. She stopped it yesterday and the burning and discomfort returned.     She recently got OTC Monostat but has started utilizing it. She wishes for a prescription for nystatin.    She denies any vaginal bleeding or discharge.     She denies any bowel related complaints, no fecal or flatal incontinence.    She has no other complaints.    From Previous note  72-year-old with significant vaginal atrophy and vaginal erythema, concerns for lichen sclerosis, pelvic floor weakness and pain, " "nocturia and history of daytime urgency, chronic kidney disease with microscopic hematuria now on 3 times a week hemodialysis.     The patient presents with complaints of  vaginal burning , she has been utilizing Yuvafem tablets once a week with 6-7 hours relief. She is also utilizing clobetasol cream twice weekly. She is noting burning and when she voids it burn and states \"like her vagina is on fire\".     She denies any vaginal bleeding or discharge.     She denies any bowel related complaints, no fecal or flatal incontinence.    She has no other complaints.    From Previous note  This visit was performed through telemedicine  72-year-old with significant vaginal atrophy and vaginal erythema, concerns for lichen sclerosis, pelvic floor weakness and pain, nocturia and daytime urgency, chronic kidney disease with microscopic hematuria now on 3 times a week hemodialysis.    The patient has not started utilizing the Gemtesa therapy, she continues to note a burning sensation even with a little amount of urine associated with burning micturition. She notes 1-2 episodes of nocturia and once during the day.  She utilizes oil around her vagina which improves the burning.  She states that the burning is not bladder but rather external vaginal tissue concerns.    She denies any vaginal bleeding or discharge. She has been utilizing the Yuvafem tablets.     She denies any bowel related complaints, no fecal or flatal incontinence.    She has no other complaints.      From Previous note   72-year-old with significant vaginal atrophy and vaginal erythema, concerns for lichen sclerosis, pelvic floor weakness and pain, nocturia and daytime urgency, chronic kidney disease with microscopic hematuria.     The patient was last seen in April 2023. She is on hemodialysis thrice a week for her kidney failure, she has small volume voids. She does have a sensation of bladder fullness but has difficulty emptying her bladder, she notes " burning when she has this sensation of fullness. She denies any burning micturition or dysuria. She denies any UTI symptoms.     She denies any vaginal complaints, no abnormal vaginal bleeding or discharge.     She denies any bowel related complaints, no fecal or flatal incontinence.     She has no other complaints.     From previous note   72-year-old with significant vaginal atrophy and vaginal erythema, concerns for lichen sclerosis, pelvic floor weakness and pain, nocturia and daytime urgency, chronic kidney disease with microscopic hematuria.     The patient is not utilizing the vaginal estrogen tablets, however she did utilize the clobetasol therapy daily for 2 weeks and continues to utilize it twice a week thereafter. She denies any vaginal itching or irritation. She denies any abnormal vaginal bleeding or discharge.      She denies any urinary frequency, but reports of burning micturition and dysuria for the past 1 month. She denies any other UTI like symptoms. It was discussed that she cannot utilize AZO for her symptoms due to her poor kidney function.     She denies any bowel related complaints, no fecal or flatal incontinence.     She has no other complaints     From previous note  72-year-old with significant vaginal atrophy and vaginal erythema, concerns for lichen sclerosis, pelvic floor weakness and pain, nocturia and daytime urgency, chronic kidney disease with microscopic hematuria.     The patient was last seen in April 2022. She states she has seen recurrence of her vaginal symptoms for the past 2 weeks. She complaints of vaginal itching and irritation. She has not used the clobetasol cream for the past 6 months.      She denies any lower urinary tract complaints.     She denies any bowel related complaints, no fecal or flatal incontinence.     She has no other complaints.        From previous note  71-year-old with significant vaginal atrophy and vaginal erythema, pelvic floor weakness and pain,  "nocturia and mild daytime urgency, and chronic kidney disease with microscopic hematuria.     The patient is noted significant improvements in her vaginal irritation complaints. She is utilizing nystatin ointment and powder. She did not take her fluconazole as the \"nystatin was working\". She otherwise denies any abnormal vaginal bleeding or discharge. She is not utilizing her vaginal estrogen therapy.     She feels her bladder is \"working better\". She never utilized her Gemtesa therapy. She does note 2-3 episodes of nocturia which is not bothersome to her and notes daytime frequency roughly every 3 hours. She denies urge related incontinence. She does not desire any therapy for her bladder at this time.     We have previously discussed her voiding diary. This demonstrates a bladder capacity of 300 cc. There is no evidence of nocturnal polyuria. The patient does have 3 episodes of nocturia at low volumes roughly 100 cc. She is noted to have daytime frequency every hour to 2 hours. The patient is drinking roughly 1 L of water daily.     She has no other complaints.        From previous note  70-year-old presenting as referral from Dr. Yun with complaints of urinary frequency, nocturia, and vaginal and bladder pain.     Patient was followed by a urologist roughly 2 years ago and started on estrogen cream at that time. She is used that sporadically but is no longer using it at this time. She notes a roughly 4-month history of worsening urinary frequency and nocturia complaints with bladder pain. She notes pain \"all the time\" and significant irritation to the vaginal tissues. She denies any abnormal discharge or bleeding. She notes daytime frequency roughly every 3 hours. However she states that she is up every hour at night with low volumes and pain. She feels that she does not empty her bladder appropriately and has to strain significantly which sometimes causes a bowel movement. She denies enuresis. She denies any " urinary leakage.     She did see infectious disease 3 months ago and was started on doxycycline for unclear reasons. She is no longer taking this medication.     She does have episodic constipation and takes MiraLAX for this. She otherwise denies any fecal or flatal incontinence.     She is not sexually active. She denies any bulge complaints.     She has no other complaints   Review of Systems  Constitutional: No fever, No chills and No fatigue.   Eyes: No vision problems and No dryness of the eyes.   ENT: No dry mouth, No hearing loss and No nosebleeds.   Cardiovascular: No chest pain, No palpitations and No orthopnea.   Respiratory: No shortness of breath, No cough and No wheezing.   Gastrointestinal: No abdominal pain, No constipation, No nausea, No diarrhea, No vomiting and No melena.   Genitourinary: As noted in HPI.   Musculoskeletal: No back pain, No myalgias, No muscle weakness, No joint swelling and No leg edema.   Integumentary: No rashes, No skin lesion and No itching.   Neurological: No headache, No numbness and No dizziness.   Psychiatric: No sleep disturbances, No anxiety and No depression.   Endocrine: No hot flashes, No loss of hair and No hirsutism.   Hematologic/Lymphatic: No swollen glands, No tendency for easy bleeding and No tendency for easy bruising.   All other systems have been reviewed and are negative for complaint.        Objective   Physical Exam  Significant erythema to the external vaginal tissues with discomfort consistent with a yeast vaginitis.    The urethra was cleaned with iodine and a straight cath urine sample was obtained noting 30 cc of cloudy yellow urine.    GenPath was obtained as well.      Assessment/Plan      72-year-old with significant vaginal atrophy and vaginal erythema, concerns for lichen sclerosis, pelvic floor weakness and pain, nocturia and history of daytime urgency, chronic kidney disease with microscopic hematuria now on 3 times a week hemodialysis  "presenting with concerns for yeast vaginitis.     1. We again discussed the patient's vaginal complaints. She has had significant irritation associated with her vaginal estrogen cream. She appears to be having excellent results with her twice weekly Vagifem tablets. She is also utilizing vaginal olive oil to help with irritative complaints with success. She will also continue her clobetasol therapy twice a week moving forward.  Pending GEN path.  The patient will be empirically started on nystatin/triamcinolone cream vaginally.     2. We again discussed the patient's lower urinary tract complaints.  Straight cath urine sample was obtained today with pending urine culture.  She has noted significant decreased urination associated with her recent hemodialysis but does note bladder \"burning\" with fullness. She unfortunately has allergies to sulfa and fluoroquinolones and has poor kidney function. We have previously discussed her voiding diary which demonstrated a low capacity hypersensitive bladder with no evidence of nocturnal polyuria. We have previously discussed the [facility] care pathway including first, second, third line therapies. She unfortunately was intolerant of Myrbetriq as this made her feel unwell. She took Gemtesa once and felt this caused urinary retention but this was during the time of her UTI.  She has had significant benefits with her phenazopyridine and will continue this 1 tablet daily. Given her hemodialysis, this will be a safe option for her.    3. The patient has been noted to have microscopic hematuria in the past that is likely related to her significant vaginal atrophy and irritation complaints. She denies any gross hematuria complaints. She is presently on hemodialysis.     4. Patient will be contacted with the results of urine culture and GenPath should she require alternate therapy.     DC Rizzo MD        Scribe Attestation  By signing my name below, Daksha ARRIOLA Scribe " attest that this documentation has been prepared under the direction and in the presence of Hola Rizzo MD. All medical record entries made by the Scribe were at my direction or personally dictated by me. I have reviewed the chart and agree that the record accurately reflects my personal performance of the history, physical exam, discussion and plan.

## 2024-03-01 ENCOUNTER — APPOINTMENT (OUTPATIENT)
Dept: UROLOGY | Facility: CLINIC | Age: 74
End: 2024-03-01
Payer: MEDICARE

## 2024-03-01 ENCOUNTER — OFFICE VISIT (OUTPATIENT)
Dept: UROLOGY | Facility: CLINIC | Age: 74
End: 2024-03-01
Payer: MEDICARE

## 2024-03-01 VITALS
BODY MASS INDEX: 24.16 KG/M2 | SYSTOLIC BLOOD PRESSURE: 183 MMHG | DIASTOLIC BLOOD PRESSURE: 78 MMHG | WEIGHT: 149.7 LBS | HEART RATE: 66 BPM

## 2024-03-01 DIAGNOSIS — R10.2 FEMALE PELVIC PAIN: Primary | ICD-10-CM

## 2024-03-01 DIAGNOSIS — N95.2 VAGINAL ATROPHY: ICD-10-CM

## 2024-03-01 DIAGNOSIS — Z99.2 HEMODIALYSIS PATIENT (CMS-HCC): ICD-10-CM

## 2024-03-01 DIAGNOSIS — L90.0 LICHEN SCLEROSUS: ICD-10-CM

## 2024-03-01 DIAGNOSIS — R39.89 BLADDER PAIN: ICD-10-CM

## 2024-03-01 DIAGNOSIS — N76.1 CHRONIC VAGINITIS: ICD-10-CM

## 2024-03-01 DIAGNOSIS — R39.9 UTI SYMPTOMS: ICD-10-CM

## 2024-03-01 PROCEDURE — 99214 OFFICE O/P EST MOD 30 MIN: CPT | Performed by: OBSTETRICS & GYNECOLOGY

## 2024-03-01 PROCEDURE — 51701 INSERT BLADDER CATHETER: CPT | Performed by: OBSTETRICS & GYNECOLOGY

## 2024-03-01 PROCEDURE — 3078F DIAST BP <80 MM HG: CPT | Performed by: OBSTETRICS & GYNECOLOGY

## 2024-03-01 PROCEDURE — 3077F SYST BP >= 140 MM HG: CPT | Performed by: OBSTETRICS & GYNECOLOGY

## 2024-03-01 PROCEDURE — 1157F ADVNC CARE PLAN IN RCRD: CPT | Performed by: OBSTETRICS & GYNECOLOGY

## 2024-03-01 PROCEDURE — 87086 URINE CULTURE/COLONY COUNT: CPT | Performed by: OBSTETRICS & GYNECOLOGY

## 2024-03-01 PROCEDURE — 1036F TOBACCO NON-USER: CPT | Performed by: OBSTETRICS & GYNECOLOGY

## 2024-03-01 RX ORDER — NYSTATIN AND TRIAMCINOLONE ACETONIDE 100000; 1 [USP'U]/G; MG/G
OINTMENT TOPICAL 2 TIMES DAILY
Qty: 15 G | Refills: 0 | Status: SHIPPED | OUTPATIENT
Start: 2024-03-01 | End: 2024-03-08

## 2024-03-01 NOTE — PATIENT INSTRUCTIONS
Please start your vaginal nystatin/triamcinolone cream to help with concerns for yeast vaginitis.    You will be contacted with the results of your urine and vaginal swab when these are available should you require alternate therapy.    Please continue your clobetasol and vaginal tablet therapy.    Please contact the clinic with any questions or concerns.    [phone]

## 2024-03-06 DIAGNOSIS — N39.0 ACUTE UTI: Primary | ICD-10-CM

## 2024-03-06 LAB — BACTERIA UR CULT: ABNORMAL

## 2024-03-06 RX ORDER — GRANULES FOR ORAL 3 G/1
POWDER ORAL
Qty: 2 EACH | Refills: 0 | Status: SHIPPED | OUTPATIENT
Start: 2024-03-06

## 2024-03-06 NOTE — PROGRESS NOTES
The patient will proceed with fosfomycin now.  We will follow-up in 1 week for treatment of cure urine culture.

## 2024-03-11 ENCOUNTER — ANTICOAGULATION - WARFARIN VISIT (OUTPATIENT)
Dept: CARDIOLOGY | Facility: HOSPITAL | Age: 74
End: 2024-03-11
Payer: MEDICARE

## 2024-03-11 DIAGNOSIS — I48.20 CHRONIC ATRIAL FIBRILLATION (MULTI): Primary | ICD-10-CM

## 2024-03-11 LAB
POC INR: 3
POC PROTHROMBIN TIME: NORMAL

## 2024-03-11 PROCEDURE — 85610 PROTHROMBIN TIME: CPT | Mod: QW

## 2024-03-11 PROCEDURE — 99211 OFF/OP EST MAY X REQ PHY/QHP: CPT | Performed by: FAMILY MEDICINE

## 2024-03-11 NOTE — PROGRESS NOTES
Patient identification verified with 2 identifiers.    Location: [facility] - 1st Floor Anticoagulation Clinic [address] [id number]    Referring Physician: Dr. Bonilla  Enrollment/ Re-enrollment date: 2024   INR Goal: 2.0-3.0  INR monitoring is per [facility] protocol.  Anticoagulation Medication: warfarin  Indication: Atrial Fibrillation/Atrial Flutter    Subjective   Bleeding signs/symptoms: No    Bruising: No   Major bleeding event: No  Thrombosis signs/symptoms: No  Thromboembolic event: No  Missed doses: No  Extra doses: No  Medication changes: No  Dietary changes: No  Change in health: No  Change in activity: No  Alcohol: No  Other concerns: No    Upcoming Procedures:  Does the Patient Have any upcoming procedures that require interruption in anticoagulation therapy? no  Does the patient require bridging? no      Anticoagulation Summary  As of 3/11/2024      INR goal:  2.0-3.0   TTR:  --   INR used for dosing:  3.00 (3/11/2024)   Weekly warfarin total:  22.5 mg               Assessment/Plan   Therapeutic     1. New dose: no change    2. Next INR: 2 weeks      Education provided to patient during the visit:  Patient instructed to call in interim with questions, concerns and changes.   Patient educated on compliance with dosing, follow up appointments, and prescribed plan of care.

## 2024-03-25 ENCOUNTER — ANTICOAGULATION - WARFARIN VISIT (OUTPATIENT)
Dept: CARDIOLOGY | Facility: HOSPITAL | Age: 74
End: 2024-03-25
Payer: MEDICARE

## 2024-03-25 ENCOUNTER — TELEPHONE (OUTPATIENT)
Dept: UROLOGY | Facility: CLINIC | Age: 74
End: 2024-03-25

## 2024-03-25 DIAGNOSIS — I48.20 CHRONIC ATRIAL FIBRILLATION (MULTI): Primary | ICD-10-CM

## 2024-03-25 LAB
POC INR: 1.4
POC PROTHROMBIN TIME: NORMAL

## 2024-03-25 PROCEDURE — 85610 PROTHROMBIN TIME: CPT | Mod: QW

## 2024-03-25 PROCEDURE — 99211 OFF/OP EST MAY X REQ PHY/QHP: CPT | Performed by: FAMILY MEDICINE

## 2024-03-25 NOTE — PROGRESS NOTES
Patient identification verified with 2 identifiers.    Location: [facility] - 1st Floor Anticoagulation Clinic [address] [id number]    Referring Physician: DR Bonilla  Enrollment/ Re-enrollment date:    INR Goal: 2.0-3.0  INR monitoring is per [facility] protocol.  Anticoagulation Medication: warfarin  Indication: Atrial Fibrillation/Atrial Flutter    Subjective   Bleeding signs/symptoms: No    Bruising: No   Major bleeding event: No  Thrombosis signs/symptoms: No  Thromboembolic event: No  Missed doses: Yes  was off for a planned surgery that was canceld becsue she was ill  Extra doses: No  Medication changes: No  Dietary changes: No  Change in health: No  Change in activity: No  Alcohol: No  Other concerns: No    Upcoming Procedures:  Does the Patient Have any upcoming procedures that require interruption in anticoagulation therapy? no  Does the patient require bridging? no      Anticoagulation Summary  As of 3/25/2024      INR goal:  2.0-3.0   TTR:  0.0 % (4 d)   INR used for dosin.40 (3/25/2024)   Weekly warfarin total:  22.5 mg               Assessment/Plan   Subtherapeutic     1. New dose: no change    2. Next INR: 1 month      Education provided to patient during the visit:  Patient instructed to call in interim with questions, concerns and changes.   Patient educated on compliance with dosing, follow up appointments, and prescribed plan of care.

## 2024-03-27 DIAGNOSIS — R39.9 UTI SYMPTOMS: Primary | ICD-10-CM

## 2024-03-27 RX ORDER — ESTRADIOL 10 UG/1
10 INSERT VAGINAL 3 TIMES WEEKLY
Qty: 12 TABLET | Refills: 1 | Status: SHIPPED | OUTPATIENT
Start: 2024-03-27 | End: 2024-05-22

## 2024-03-27 NOTE — PROGRESS NOTES
Pt left message for nurse to call her back at [phone], due to she has questions about the meds Yuvafem. Patient states the medication Bactrim that was prescribe increases her potassium. Patient also says she is still having burning during urination.   I called patient at [phone]. She is requesting a refill of her Yuvafem medication. She is also experiencing UTI symptoms. I did inform patient that once she drops off urine culture culture to contact the office to proceed with antibiotic treatment. Patient is aware and a medication request has been placed. Patient given office number [phone].

## 2024-03-29 ENCOUNTER — LAB (OUTPATIENT)
Dept: LAB | Facility: LAB | Age: 74
End: 2024-03-29
Payer: MEDICARE

## 2024-03-29 DIAGNOSIS — R39.9 UTI SYMPTOMS: ICD-10-CM

## 2024-03-29 LAB
APPEARANCE UR: ABNORMAL
BACTERIA #/AREA URNS AUTO: ABNORMAL /HPF
BILIRUB UR STRIP.AUTO-MCNC: NEGATIVE MG/DL
COLOR UR: ABNORMAL
GLUCOSE UR STRIP.AUTO-MCNC: NORMAL MG/DL
KETONES UR STRIP.AUTO-MCNC: NEGATIVE MG/DL
LEUKOCYTE ESTERASE UR QL STRIP.AUTO: ABNORMAL
NITRITE UR QL STRIP.AUTO: NEGATIVE
PH UR STRIP.AUTO: 6.5 [PH]
PROT UR STRIP.AUTO-MCNC: ABNORMAL MG/DL
RBC # UR STRIP.AUTO: ABNORMAL /UL
RBC #/AREA URNS AUTO: ABNORMAL /HPF
SP GR UR STRIP.AUTO: 1.01
SQUAMOUS #/AREA URNS AUTO: ABNORMAL /HPF
UROBILINOGEN UR STRIP.AUTO-MCNC: NORMAL MG/DL
WBC #/AREA URNS AUTO: >50 /HPF

## 2024-03-29 PROCEDURE — 81001 URINALYSIS AUTO W/SCOPE: CPT

## 2024-04-24 ENCOUNTER — ANTICOAGULATION - WARFARIN VISIT (OUTPATIENT)
Dept: CARDIOLOGY | Facility: HOSPITAL | Age: 74
End: 2024-04-24
Payer: MEDICARE

## 2024-04-24 DIAGNOSIS — I48.20 CHRONIC ATRIAL FIBRILLATION (MULTI): Primary | ICD-10-CM

## 2024-04-24 LAB
POC INR: 3.3
POC PROTHROMBIN TIME: NORMAL

## 2024-04-24 PROCEDURE — 85610 PROTHROMBIN TIME: CPT

## 2024-04-24 PROCEDURE — 99211 OFF/OP EST MAY X REQ PHY/QHP: CPT | Performed by: FAMILY MEDICINE

## 2024-04-24 NOTE — PROGRESS NOTES
Patient identification verified with 2 identifiers.    Location: [facility] - 1st Floor Anticoagulation Clinic [address] [id number]    Referring Physician: Dr Bonilla  Enrollment/ Re-enrollment date: 2024   INR Goal: 2.0-3.0  INR monitoring is per [facility] protocol.  Anticoagulation Medication: warfarin  Indication: Atrial Fibrillation/Atrial Flutter    Subjective   Bleeding signs/symptoms: No    Bruising: No   Major bleeding event: No  Thrombosis signs/symptoms: No  Thromboembolic event: No  Missed doses: No  Extra doses: No  Medication changes: No  Dietary changes: No  Change in health: No  Change in activity: No  Alcohol: No  Other concerns: No    Upcoming Procedures:  Does the Patient Have any upcoming procedures that require interruption in anticoagulation therapy? no  Does the patient require bridging? no      Anticoagulation Summary  As of 4/24/2024      INR goal:  2.0-3.0   TTR:  46.4% (1.1 mo)   INR used for dosing:  3.30 (4/24/2024)   Weekly warfarin total:  22.5 mg               Assessment/Plan   Supratherapeutic     1. New dose: no change    2. Next INR: 1 month      Education provided to patient during the visit:  Patient instructed to call in interim with questions, concerns and changes.

## 2024-04-29 ENCOUNTER — APPOINTMENT (OUTPATIENT)
Dept: CARDIOLOGY | Facility: HOSPITAL | Age: 74
End: 2024-04-29
Payer: MEDICARE

## 2024-04-29 ENCOUNTER — ANTICOAGULATION - WARFARIN VISIT (OUTPATIENT)
Dept: CARDIOLOGY | Facility: HOSPITAL | Age: 74
End: 2024-04-29
Payer: MEDICARE

## 2024-04-29 DIAGNOSIS — I48.20 CHRONIC ATRIAL FIBRILLATION (MULTI): Primary | ICD-10-CM

## 2024-05-06 ENCOUNTER — APPOINTMENT (OUTPATIENT)
Dept: CARDIOLOGY | Facility: HOSPITAL | Age: 74
End: 2024-05-06
Payer: MEDICARE

## 2024-05-09 ENCOUNTER — ANTICOAGULATION - WARFARIN VISIT (OUTPATIENT)
Dept: CARDIOLOGY | Facility: HOSPITAL | Age: 74
End: 2024-05-09
Payer: MEDICARE

## 2024-05-09 DIAGNOSIS — I48.20 CHRONIC ATRIAL FIBRILLATION (MULTI): Primary | ICD-10-CM

## 2024-05-09 NOTE — PROGRESS NOTES
Had a death in the family and needed to cancel appointment, will call to reschedule, also will touch base next week to reschedule

## 2024-05-23 ENCOUNTER — ANTICOAGULATION - WARFARIN VISIT (OUTPATIENT)
Dept: CARDIOLOGY | Facility: HOSPITAL | Age: 74
End: 2024-05-23
Payer: MEDICARE

## 2024-05-23 DIAGNOSIS — I48.20 CHRONIC ATRIAL FIBRILLATION (MULTI): ICD-10-CM

## 2024-05-30 ENCOUNTER — ANTICOAGULATION - WARFARIN VISIT (OUTPATIENT)
Dept: CARDIOLOGY | Facility: HOSPITAL | Age: 74
End: 2024-05-30
Payer: MEDICARE

## 2024-05-30 DIAGNOSIS — I48.20 CHRONIC ATRIAL FIBRILLATION (MULTI): ICD-10-CM

## 2024-06-03 ENCOUNTER — ANTICOAGULATION - WARFARIN VISIT (OUTPATIENT)
Dept: CARDIOLOGY | Facility: HOSPITAL | Age: 74
End: 2024-06-03
Payer: MEDICARE

## 2024-06-03 DIAGNOSIS — I48.20 CHRONIC ATRIAL FIBRILLATION (MULTI): ICD-10-CM

## 2024-06-03 LAB
POC INR: 1.8
POC PROTHROMBIN TIME: NORMAL

## 2024-06-03 PROCEDURE — 99211 OFF/OP EST MAY X REQ PHY/QHP: CPT | Performed by: INTERNAL MEDICINE

## 2024-06-03 PROCEDURE — 85610 PROTHROMBIN TIME: CPT | Mod: QW

## 2024-06-03 NOTE — PROGRESS NOTES
Patient identification verified with 2 identifiers.    Location: [facility] - 1st Floor Anticoagulation Clinic [address] [id number]    Referring Physician: DR Bonilla  Enrollment/ Re-enrollment date: 2024   INR Goal: 2.0-3.0  INR monitoring is per [facility] protocol.  Anticoagulation Medication: warfarin  Indication: Atrial Fibrillation/Atrial Flutter    Subjective   Bleeding signs/symptoms:      Bruising:     Major bleeding event:    Thrombosis signs/symptoms:    Thromboembolic event:    Missed doses:    Extra doses:    Medication changes:    Dietary changes:    Change in health:    Change in activity:    Alcohol:    Other concerns:      Upcoming Procedures:  Does the Patient Have any upcoming procedures that require interruption in anticoagulation therapy? no  Does the patient require bridging? no      Anticoagulation Summary  As of 6/3/2024      INR goal:  2.0-3.0   TTR:  46.4% (1.1 mo)   INR used for dosing:                 Assessment/Plan   Subtherapeutic     1. New dose:  dose increased     2. Next INR: 1 week      Education provided to patient during the visit:  Patient instructed to call in interim with questions, concerns and changes.

## 2024-06-10 ENCOUNTER — OFFICE VISIT (OUTPATIENT)
Dept: VASCULAR SURGERY | Facility: CLINIC | Age: 74
End: 2024-06-10
Payer: MEDICARE

## 2024-06-10 ENCOUNTER — HOSPITAL ENCOUNTER (OUTPATIENT)
Facility: HOSPITAL | Age: 74
Setting detail: OUTPATIENT SURGERY
End: 2024-06-10
Attending: SURGERY | Admitting: SURGERY
Payer: MEDICARE

## 2024-06-10 VITALS
RESPIRATION RATE: 18 BRPM | BODY MASS INDEX: 23.74 KG/M2 | HEIGHT: 66 IN | HEART RATE: 67 BPM | DIASTOLIC BLOOD PRESSURE: 90 MMHG | SYSTOLIC BLOOD PRESSURE: 178 MMHG | WEIGHT: 147.71 LBS

## 2024-06-10 DIAGNOSIS — N18.6 CKD (CHRONIC KIDNEY DISEASE) STAGE V REQUIRING CHRONIC DIALYSIS (MULTI): ICD-10-CM

## 2024-06-10 DIAGNOSIS — Z99.2 CKD (CHRONIC KIDNEY DISEASE) STAGE V REQUIRING CHRONIC DIALYSIS (MULTI): ICD-10-CM

## 2024-06-10 DIAGNOSIS — Z99.2 CKD (CHRONIC KIDNEY DISEASE) STAGE V REQUIRING CHRONIC DIALYSIS (MULTI): Primary | ICD-10-CM

## 2024-06-10 DIAGNOSIS — T82.590A MALFUNCTION OF ARTERIOVENOUS DIALYSIS FISTULA, INITIAL ENCOUNTER (CMS-HCC): ICD-10-CM

## 2024-06-10 DIAGNOSIS — N18.6 CKD (CHRONIC KIDNEY DISEASE) STAGE V REQUIRING CHRONIC DIALYSIS (MULTI): Primary | ICD-10-CM

## 2024-06-10 PROCEDURE — 1159F MED LIST DOCD IN RCRD: CPT | Performed by: SURGERY

## 2024-06-10 PROCEDURE — 3080F DIAST BP >= 90 MM HG: CPT | Performed by: SURGERY

## 2024-06-10 PROCEDURE — 99205 OFFICE O/P NEW HI 60 MIN: CPT | Performed by: SURGERY

## 2024-06-10 PROCEDURE — 1157F ADVNC CARE PLAN IN RCRD: CPT | Performed by: SURGERY

## 2024-06-10 PROCEDURE — 3077F SYST BP >= 140 MM HG: CPT | Performed by: SURGERY

## 2024-06-10 PROCEDURE — 1036F TOBACCO NON-USER: CPT | Performed by: SURGERY

## 2024-06-10 PROCEDURE — 99215 OFFICE O/P EST HI 40 MIN: CPT | Performed by: SURGERY

## 2024-06-10 PROCEDURE — 4010F ACE/ARB THERAPY RXD/TAKEN: CPT | Performed by: SURGERY

## 2024-06-10 PROCEDURE — 1126F AMNT PAIN NOTED NONE PRSNT: CPT | Performed by: SURGERY

## 2024-06-10 ASSESSMENT — PAIN SCALES - GENERAL: PAINLEVEL: 0-NO PAIN

## 2024-06-10 ASSESSMENT — PATIENT HEALTH QUESTIONNAIRE - PHQ9
1. LITTLE INTEREST OR PLEASURE IN DOING THINGS: NOT AT ALL
2. FEELING DOWN, DEPRESSED OR HOPELESS: NOT AT ALL
SUM OF ALL RESPONSES TO PHQ9 QUESTIONS 1 AND 2: 0

## 2024-06-10 ASSESSMENT — VISUAL ACUITY: OU: 1

## 2024-06-10 ASSESSMENT — LIFESTYLE VARIABLES
HOW MANY STANDARD DRINKS CONTAINING ALCOHOL DO YOU HAVE ON A TYPICAL DAY: PATIENT DOES NOT DRINK
AUDIT-C TOTAL SCORE: 0
SKIP TO QUESTIONS 9-10: 1
HOW OFTEN DO YOU HAVE A DRINK CONTAINING ALCOHOL: NEVER
HOW OFTEN DO YOU HAVE SIX OR MORE DRINKS ON ONE OCCASION: NEVER

## 2024-06-10 ASSESSMENT — ENCOUNTER SYMPTOMS
OCCASIONAL FEELINGS OF UNSTEADINESS: 1
DEPRESSION: 0
LOSS OF SENSATION IN FEET: 1

## 2024-06-10 NOTE — PROGRESS NOTES
Vascular Surgery Consultation, History, Physical    Eli Zavala is a 73 y.o. year old female patient.   History: Patient comes for dialysis access needs.  She has a left arm brachiocephalic fistula which has developed some modest aneurysmal dilatation of the antecubital fossa and then there appears to be a superficialization identified by a linear scar on the arm.  The fistula has a pulse.  She has ecchymosis from an attempted access which black.  She does have fragile skin which rolls the fistula quite a bit.  She is desirous of getting rid of the catheter eventually.    Past Medical History:   Diagnosis Date    Atherosclerotic heart disease of native coronary artery without angina pectoris     Coronary heart disease    Chronic kidney disease, stage 3 unspecified (Multi)     Chronic kidney disease, stage III (moderate)    Hyperuricemia without signs of inflammatory arthritis and tophaceous disease     Asymptomatic hyperuricemia    Personal history of diseases of the blood and blood-forming organs and certain disorders involving the immune mechanism     History of anemia    Personal history of Hodgkin lymphoma     History of Hodgkin's lymphoma    Personal history of other diseases of the circulatory system     History of essential hypertension    Personal history of other diseases of the digestive system     History of gastroesophageal reflux (GERD)    Personal history of other diseases of the musculoskeletal system and connective tissue     Personal history of arthritis    Personal history of other endocrine, nutritional and metabolic disease     History of hypothyroidism    Personal history of other endocrine, nutritional and metabolic disease 05/03/2021    History of type 2 diabetes mellitus    Personal history of other endocrine, nutritional and metabolic disease     History of hypercholesterolemia    Personal history of other endocrine, nutritional and metabolic disease     History of thyroid disease     Personal history of other specified conditions     History of shortness of breath    Type 2 diabetes mellitus with other diabetic kidney complication (Multi)     Type 2 diabetes mellitus with renal manifestations       Past Surgical History:   Procedure Laterality Date    CHOLECYSTECTOMY  03/11/2014    Cholecystectomy Laparoscopic    MR HEAD ANGIO WO IV CONTRAST  8/16/2018    MR HEAD ANGIO WO IV CONTRAST [facility]    MR NECK ANGIO WO IV CONTRAST  8/16/2018    MR NECK ANGIO WO IV CONTRAST [facility]    OTHER SURGICAL HISTORY  09/16/2021    Hysterectomy    SINUS SURGERY  08/21/2014    Sinus Surgery    TONSILLECTOMY  08/21/2014    Tonsillectomy       Active Ambulatory Problems     Diagnosis Date Noted    Altered mental status 10/08/2023    Anemia of chronic disease 10/08/2023    Angina pectoris (CMS-HCC) 10/08/2023    Ankle arthritis 10/08/2023    Atherosclerosis of coronary artery 10/08/2023    Bilateral sensorineural hearing loss 10/08/2023    Cerebrovascular accident (CVA) (Multi) 10/08/2023    Chest discomfort 10/08/2023    Chronic atrial fibrillation (Multi) 10/08/2023    Chronic edema 10/08/2023    Closed trimalleolar fracture 10/08/2023    Colitis 10/08/2023    Contusion of knee 10/08/2023    Chronic renal impairment associated with type 2 diabetes mellitus (Multi) 10/08/2023    Dyspnea 10/08/2023    Electrocardiogram abnormal 10/08/2023    Electrolyte imbalance 10/08/2023    Epigastric pain 10/08/2023    Benign essential hypertension 10/08/2023    Asthenia 10/08/2023    Female pelvic pain 10/08/2023    Gastroesophageal reflux disease 10/08/2023    Heart failure (Multi) 10/08/2023    Hematuria 10/08/2023    History of coronary artery stent placement 10/08/2023    History of deep vein thrombosis 10/08/2023    History of non-Hodgkin's lymphoma 10/08/2023    Hypertensive urgency 10/08/2023    Hyperkalemia 10/08/2023    Hypomagnesemia 10/08/2023    Hyponatremia 10/08/2023    Hypothyroid  10/08/2023    Injury of lower extremity 10/08/2023    Iron deficiency 10/08/2023    Lichen sclerosus 10/08/2023    Lower leg edema 10/08/2023    Lower urinary tract symptoms (LUTS) 10/08/2023    Lumbar pain 10/08/2023    Hyperlipidemia 10/08/2023    Fibromyalgia 10/08/2023    Nocturia 10/08/2023    Obesity 10/08/2023    Pelvic floor weakness 10/08/2023    Post-traumatic arthritis DRUJ (distal radioulnar joint), right 10/08/2023    Right wrist pain 10/08/2023    Complicated migraine 10/08/2023    Seizure disorder (Multi) 10/08/2023    Severe protein-calorie malnutrition ([place]: less than 60% of standard weight) (Multi) 10/08/2023    Chronic kidney disease, stage 5 (Multi) 10/08/2023    Syncope 10/08/2023    Temporal arteritis (Multi) 10/08/2023    Hematemesis 10/08/2023    Vaginal atrophy 10/08/2023     Resolved Ambulatory Problems     Diagnosis Date Noted    No Resolved Ambulatory Problems     Past Medical History:   Diagnosis Date    Atherosclerotic heart disease of native coronary artery without angina pectoris     Chronic kidney disease, stage 3 unspecified (Multi)     Hyperuricemia without signs of inflammatory arthritis and tophaceous disease     Personal history of diseases of the blood and blood-forming organs and certain disorders involving the immune mechanism     Personal history of Hodgkin lymphoma     Personal history of other diseases of the circulatory system     Personal history of other diseases of the digestive system     Personal history of other diseases of the musculoskeletal system and connective tissue     Personal history of other endocrine, nutritional and metabolic disease     Personal history of other endocrine, nutritional and metabolic disease 05/03/2021    Personal history of other endocrine, nutritional and metabolic disease     Personal history of other endocrine, nutritional and metabolic disease     Personal history of other specified conditions     Type 2 diabetes mellitus with  other diabetic kidney complication (Multi)        Review of Systems   Constitutional: Negative.    HENT: Negative.     Eyes: Negative.    Respiratory: Negative.     Cardiovascular: Negative.    Gastrointestinal: Negative.    Endocrine: Negative.    Genitourinary: Negative.    Musculoskeletal: Negative.    Skin: Negative.    Allergic/Immunologic: Negative.    Neurological: Negative.    Hematological: Negative.    Psychiatric/Behavioral: Negative.       Allergies   Allergen Reactions    Levofloxacin Itching and Unknown    Atorvastatin Other and Unknown    Fentanyl Nausea/vomiting    Heparin (Porcine) Nausea/vomiting    Sulfamethoxazole-Trimethoprim Other and Unknown    Adhesive Tape-Silicones Unknown    Amoxicillin Unknown    Omeprazole Unknown    Thiopental Unknown    Sulfa (Sulfonamide Antibiotics) Itching and Unknown       Vitals:   Visit Vitals  /90   Pulse 67   Resp 18     Physical Exam:   Vascular Physical Exam  Constitutional:       General: She is awake.   HENT:      Head: Normocephalic and atraumatic.      Nose: Nose normal.   Eyes:      General: Vision grossly intact.      Extraocular Movements: Extraocular movements intact.      Pupils: Pupils are equal, round, and reactive to light.   Cardiovascular:      Rate and Rhythm: Normal rate and regular rhythm.      Pulses:           Radial pulses are 2+ on the right side and 1+ on the left side.        Brachial pulses are 2+ on the right side and 2+ on the left side.     Lawrence test results: left ulnar artery intact.     Comments: Excellent thrill in the proximal fistula but also there is a pulse throughout the fistula and pronounced at the aneurysmal portion above the antecubital fossa on the left arm  Pulmonary:      Effort: Pulmonary effort is normal.   Abdominal:      Palpations: Abdomen is soft.   Musculoskeletal:         General: Normal range of motion.   Skin:     General: Skin is warm.   Neurological:      General: No focal deficit present.       "Mental Status: She is alert.   Psychiatric:         Mood and Affect: Mood normal.         Behavior: Behavior normal.         Labs:  LABS:  CBC with Differential:    Lab Results   Component Value Date    WBC 4.1 (L) 09/21/2023    RBC 3.52 (L) 09/21/2023    HGB 11.5 (L) 09/21/2023    HCT 34.9 (L) 09/21/2023     (L) 09/21/2023    MCV 99.1 09/21/2023    MCH 32.7 09/21/2023    MCHC 33.0 09/21/2023    NRBC 0 09/21/2023    LYMPHOPCT 53.70 (H) 09/21/2023    MONOPCT 9.40 (H) 09/21/2023    MYELOPCT 1.00 (H) 10/24/2022    BASOPCT 1.50 (H) 09/21/2023    MONOSABS 0.38 09/21/2023    LYMPHSABS 2.18 09/21/2023    EOSABS 0.08 09/21/2023    BASOSABS 0.06 09/21/2023     CMP:    Lab Results   Component Value Date     09/21/2023    K 3.3 (L) 09/21/2023     09/21/2023    CO2 20 (L) 09/21/2023    BUN 33 (H) 09/21/2023    CREATININE 2.8 (H) 09/21/2023    GLUCOSE 137 (H) 09/21/2023    PROT 4.5 (L) 09/21/2023    CALCIUM 7.0 (L) 09/21/2023    BILITOT 0.4 09/21/2023    ALKPHOS 190 (H) 09/21/2023    AST 95 (H) 09/21/2023     (H) 09/21/2023     BMP:    Lab Results   Component Value Date     09/21/2023    K 3.3 (L) 09/21/2023     09/21/2023    CO2 20 (L) 09/21/2023    BUN 33 (H) 09/21/2023    CREATININE 2.8 (H) 09/21/2023    CALCIUM 7.0 (L) 09/21/2023    GLUCOSE 137 (H) 09/21/2023     Magnesium:  Lab Results   Component Value Date    MG 1.7 09/21/2023     Troponin:  No results found for: \"TROPHS\"  BNP: No results found for: \"BNP\"    Lab Results   Component Value Date    INR 1.80 06/03/2024    PROTIME 25.4 (H) 09/21/2023    CHOL 165 12/08/2022    HDL 54 12/08/2022       Imaging: none      Assessment/Plan   Diagnoses and all orders for this visit:  CKD (chronic kidney disease) stage V requiring chronic dialysis (Multi)  -     Vascular US upper extremity hemodialysis access duplex left; Future  -     Case Request Cath Lab: Peripheral Fistulagram W/ Declot  Malfunction of arteriovenous dialysis fistula, initial " encounter (CMS-[facility])  -     Vascular US upper extremity hemodialysis access duplex left; Future  -     Case Request Cath Lab: Peripheral Fistulagram W/ Declot  I believe the fistula might be salvageable but will need fistulography to determine where the occlusive disease is and to identify points for any revision surgery.  The risk and rationale for fistulography was discussed and the patient is agreeable to proceeding.  Will also get a duplex.  She dialyzes on Tuesdays, Thursdays, Saturdays.

## 2024-06-17 ENCOUNTER — TELEPHONE (OUTPATIENT)
Dept: VASCULAR SURGERY | Facility: CLINIC | Age: 74
End: 2024-06-17
Payer: MEDICARE

## 2024-06-17 DIAGNOSIS — Z99.2 STAGE 5 CHRONIC KIDNEY DISEASE ON CHRONIC DIALYSIS (MULTI): ICD-10-CM

## 2024-06-17 DIAGNOSIS — N18.6 STAGE 5 CHRONIC KIDNEY DISEASE ON CHRONIC DIALYSIS (MULTI): ICD-10-CM

## 2024-06-17 DIAGNOSIS — N18.6 CKD (CHRONIC KIDNEY DISEASE) REQUIRING CHRONIC DIALYSIS (MULTI): ICD-10-CM

## 2024-06-17 DIAGNOSIS — Z99.2 CKD (CHRONIC KIDNEY DISEASE) REQUIRING CHRONIC DIALYSIS (MULTI): ICD-10-CM

## 2024-06-17 DIAGNOSIS — Z01.818 PREOPERATIVE TESTING: ICD-10-CM

## 2024-06-17 NOTE — TELEPHONE ENCOUNTER
Patient called and instructed to stop coumadin today.  Patient states that she takes because she had DVT 15 years ago.  Instructed to hold insulin the morning of surgery and to remain NPO after midnight the night before.  Labs to be drawn on admit to check INR.  Patient states that she needs labs drawn when her IV is started due to having poor access.

## 2024-06-18 ENCOUNTER — APPOINTMENT (OUTPATIENT)
Dept: VASCULAR MEDICINE | Facility: CLINIC | Age: 74
End: 2024-06-18
Payer: MEDICARE

## 2024-06-19 ENCOUNTER — ANCILLARY PROCEDURE (OUTPATIENT)
Dept: VASCULAR MEDICINE | Facility: CLINIC | Age: 74
End: 2024-06-19
Payer: MEDICARE

## 2024-06-19 DIAGNOSIS — T82.858A STENOSIS OF OTHER VASCULAR PROSTHETIC DEVICES, IMPLANTS AND GRAFTS, INITIAL ENCOUNTER (CMS-HCC): ICD-10-CM

## 2024-06-19 DIAGNOSIS — Z99.2 CKD (CHRONIC KIDNEY DISEASE) STAGE V REQUIRING CHRONIC DIALYSIS (MULTI): ICD-10-CM

## 2024-06-19 DIAGNOSIS — T82.590A MALFUNCTION OF ARTERIOVENOUS DIALYSIS FISTULA, INITIAL ENCOUNTER (CMS-HCC): ICD-10-CM

## 2024-06-19 DIAGNOSIS — N18.6 CKD (CHRONIC KIDNEY DISEASE) STAGE V REQUIRING CHRONIC DIALYSIS (MULTI): ICD-10-CM

## 2024-06-19 PROCEDURE — 93990 DOPPLER FLOW TESTING: CPT | Performed by: SURGERY

## 2024-06-19 PROCEDURE — 93990 DOPPLER FLOW TESTING: CPT

## 2024-06-20 ENCOUNTER — TELEPHONE (OUTPATIENT)
Dept: UROLOGY | Facility: CLINIC | Age: 74
End: 2024-06-20

## 2024-06-20 DIAGNOSIS — R39.9 UTI SYMPTOMS: ICD-10-CM

## 2024-06-20 RX ORDER — ESTRADIOL 10 UG/1
10 INSERT VAGINAL 3 TIMES WEEKLY
Qty: 12 TABLET | Refills: 11 | Status: SHIPPED | OUTPATIENT
Start: 2024-06-21 | End: 2025-05-23

## 2024-06-20 NOTE — TELEPHONE ENCOUNTER
Pt is requesting a refill for:  estradiol (Yuvafem) 10 mcg tablet vaginal tablet [[id number]]   Asking for it to be sent to [facility]

## 2024-06-24 ENCOUNTER — APPOINTMENT (OUTPATIENT)
Dept: UROLOGY | Facility: CLINIC | Age: 74
End: 2024-06-24
Payer: MEDICARE

## 2024-06-24 DIAGNOSIS — R39.9 UTI SYMPTOMS: Primary | ICD-10-CM

## 2024-06-24 DIAGNOSIS — N76.1 CHRONIC VAGINITIS: ICD-10-CM

## 2024-06-24 DIAGNOSIS — L90.0 LICHEN SCLEROSUS: ICD-10-CM

## 2024-06-24 DIAGNOSIS — R10.2 FEMALE PELVIC PAIN: ICD-10-CM

## 2024-06-24 DIAGNOSIS — Z99.2 HEMODIALYSIS PATIENT (CMS-HCC): ICD-10-CM

## 2024-06-24 DIAGNOSIS — N95.2 VAGINAL ATROPHY: ICD-10-CM

## 2024-06-24 LAB
APPEARANCE UR: ABNORMAL
BILIRUB UR STRIP.AUTO-MCNC: NEGATIVE MG/DL
COLOR UR: YELLOW
GLUCOSE UR STRIP.AUTO-MCNC: ABNORMAL MG/DL
KETONES UR STRIP.AUTO-MCNC: NEGATIVE MG/DL
LEUKOCYTE ESTERASE UR QL STRIP.AUTO: ABNORMAL
MUCOUS THREADS #/AREA URNS AUTO: ABNORMAL /LPF
NITRITE UR QL STRIP.AUTO: NEGATIVE
PH UR STRIP.AUTO: 6 [PH]
POC APPEARANCE, URINE: CLEAR
POC BILIRUBIN, URINE: NEGATIVE
POC BLOOD, URINE: ABNORMAL
POC COLOR, URINE: YELLOW
POC GLUCOSE, URINE: ABNORMAL MG/DL
POC KETONES, URINE: ABNORMAL MG/DL
POC LEUKOCYTES, URINE: ABNORMAL
POC NITRITE,URINE: NEGATIVE
POC PH, URINE: 6 PH
POC PROTEIN, URINE: ABNORMAL MG/DL
POC SPECIFIC GRAVITY, URINE: 1.02
POC UROBILINOGEN, URINE: 0.2 EU/DL
PROT UR STRIP.AUTO-MCNC: ABNORMAL MG/DL
RBC # UR STRIP.AUTO: ABNORMAL /UL
RBC #/AREA URNS AUTO: ABNORMAL /HPF
SP GR UR STRIP.AUTO: 1.01
SQUAMOUS #/AREA URNS AUTO: ABNORMAL /HPF
UROBILINOGEN UR STRIP.AUTO-MCNC: NORMAL MG/DL
WBC #/AREA URNS AUTO: >50 /HPF
WBC CLUMPS #/AREA URNS AUTO: ABNORMAL /HPF

## 2024-06-24 PROCEDURE — 81003 URINALYSIS AUTO W/O SCOPE: CPT | Performed by: OBSTETRICS & GYNECOLOGY

## 2024-06-24 PROCEDURE — 4010F ACE/ARB THERAPY RXD/TAKEN: CPT | Performed by: OBSTETRICS & GYNECOLOGY

## 2024-06-24 PROCEDURE — 81001 URINALYSIS AUTO W/SCOPE: CPT

## 2024-06-24 PROCEDURE — 1157F ADVNC CARE PLAN IN RCRD: CPT | Performed by: OBSTETRICS & GYNECOLOGY

## 2024-06-24 PROCEDURE — 99213 OFFICE O/P EST LOW 20 MIN: CPT | Performed by: OBSTETRICS & GYNECOLOGY

## 2024-06-24 PROCEDURE — 87086 URINE CULTURE/COLONY COUNT: CPT

## 2024-06-24 PROCEDURE — 51701 INSERT BLADDER CATHETER: CPT | Performed by: OBSTETRICS & GYNECOLOGY

## 2024-06-24 RX ORDER — CLOBETASOL PROPIONATE 0.5 MG/G
CREAM TOPICAL
Qty: 30 G | Refills: 2 | Status: SHIPPED | OUTPATIENT
Start: 2024-06-24

## 2024-06-24 RX ORDER — ESTRADIOL 10 UG/1
10 INSERT VAGINAL 3 TIMES WEEKLY
Qty: 12 TABLET | Refills: 11 | Status: SHIPPED | OUTPATIENT
Start: 2024-06-24 | End: 2024-06-24 | Stop reason: SDUPTHER

## 2024-06-24 RX ORDER — ESTRADIOL 10 UG/1
10 INSERT VAGINAL 3 TIMES WEEKLY
Qty: 12 TABLET | Refills: 11 | Status: SHIPPED | OUTPATIENT
Start: 2024-06-24 | End: 2025-05-26

## 2024-06-24 NOTE — PROGRESS NOTES
"Subjective   Patient ID: Eli Zavala is a 73 y.o. female who presents for vaginal irritation of micturition.    HPI  72-year-old with significant vaginal atrophy and vaginal erythema, concerns for lichen sclerosis, pelvic floor weakness and pain, nocturia and history of daytime urgency, chronic kidney disease with microscopic hematuria now on 3 times a week hemodialysis presenting with concerns for yeast vaginitis.    The patient presents today with concerns regarding vaginal irritation when she voids. She had been utilizing Yuvafem until 2 weeks ago and her symptoms started after she stopped. She also utilizes clobetasol. She denies any vaginal complaints, no abnormal bleeding or discharge.      She denies any urinary urgency and frequency complaints. She denies any episodes of nocturia or enuresis. She denies any UTI like symptoms.     She denies any bowel related complaints, no fecal or flatal incontinence.    She has no other complaints.    From Previous note  72-year-old with significant vaginal atrophy and vaginal erythema, concerns for lichen sclerosis, pelvic floor weakness and pain, nocturia and history of daytime urgency, chronic kidney disease with microscopic hematuria now on 3 times a week hemodialysis.    She reports burning today that is only present when she feels the urge to void. This is localized to the outside of her vagina and not in the bladder. She adds that she uses Yuvafem twice a week this has been helping. She has been using olive oil as well but this does not help.  She does continue her clobetasol 2-3 times weekly as well.  She denies any abnormal vaginal discharge.    She is continuing to follow-up with hemodialysis and notes very rare urinary symptoms but significant pain while she urinates \"a few drops\".    She denies any bowel related complaints, no fecal or flatal incontinence.    She has no other complaints.      From Previous note  This visit was performed through " "telemedicine  72-year-old with significant vaginal atrophy and vaginal erythema, concerns for lichen sclerosis, pelvic floor weakness and pain, nocturia and history of daytime urgency, chronic kidney disease with microscopic hematuria now on 3 times a week hemodialysis.    The patient utilized phenazopyridine once a day and it helped her bladder burning and discomfort complaints. She stopped it yesterday and the burning and discomfort returned.     She recently got OTC Monostat but has started utilizing it. She wishes for a prescription for nystatin.    She denies any vaginal bleeding or discharge.     She denies any bowel related complaints, no fecal or flatal incontinence.    She has no other complaints.    From Previous note  72-year-old with significant vaginal atrophy and vaginal erythema, concerns for lichen sclerosis, pelvic floor weakness and pain, nocturia and history of daytime urgency, chronic kidney disease with microscopic hematuria now on 3 times a week hemodialysis.     The patient presents with complaints of  vaginal burning , she has been utilizing Yuvafem tablets once a week with 6-7 hours relief. She is also utilizing clobetasol cream twice weekly. She is noting burning and when she voids it burn and states \"like her vagina is on fire\".     She denies any vaginal bleeding or discharge.     She denies any bowel related complaints, no fecal or flatal incontinence.    She has no other complaints.    From Previous note  This visit was performed through telemedicine  72-year-old with significant vaginal atrophy and vaginal erythema, concerns for lichen sclerosis, pelvic floor weakness and pain, nocturia and daytime urgency, chronic kidney disease with microscopic hematuria now on 3 times a week hemodialysis.    The patient has not started utilizing the Gemtesa therapy, she continues to note a burning sensation even with a little amount of urine associated with burning micturition. She notes 1-2 " episodes of nocturia and once during the day.  She utilizes oil around her vagina which improves the burning.  She states that the burning is not bladder but rather external vaginal tissue concerns.    She denies any vaginal bleeding or discharge. She has been utilizing the Yuvafem tablets.     She denies any bowel related complaints, no fecal or flatal incontinence.    She has no other complaints.      From Previous note   72-year-old with significant vaginal atrophy and vaginal erythema, concerns for lichen sclerosis, pelvic floor weakness and pain, nocturia and daytime urgency, chronic kidney disease with microscopic hematuria.     The patient was last seen in April 2023. She is on hemodialysis thrice a week for her kidney failure, she has small volume voids. She does have a sensation of bladder fullness but has difficulty emptying her bladder, she notes burning when she has this sensation of fullness. She denies any burning micturition or dysuria. She denies any UTI symptoms.     She denies any vaginal complaints, no abnormal vaginal bleeding or discharge.     She denies any bowel related complaints, no fecal or flatal incontinence.     She has no other complaints.     From previous note   72-year-old with significant vaginal atrophy and vaginal erythema, concerns for lichen sclerosis, pelvic floor weakness and pain, nocturia and daytime urgency, chronic kidney disease with microscopic hematuria.     The patient is not utilizing the vaginal estrogen tablets, however she did utilize the clobetasol therapy daily for 2 weeks and continues to utilize it twice a week thereafter. She denies any vaginal itching or irritation. She denies any abnormal vaginal bleeding or discharge.      She denies any urinary frequency, but reports of burning micturition and dysuria for the past 1 month. She denies any other UTI like symptoms. It was discussed that she cannot utilize AZO for her symptoms due to her poor kidney  "function.     She denies any bowel related complaints, no fecal or flatal incontinence.     She has no other complaints     From previous note  72-year-old with significant vaginal atrophy and vaginal erythema, concerns for lichen sclerosis, pelvic floor weakness and pain, nocturia and daytime urgency, chronic kidney disease with microscopic hematuria.     The patient was last seen in April 2022. She states she has seen recurrence of her vaginal symptoms for the past 2 weeks. She complaints of vaginal itching and irritation. She has not used the clobetasol cream for the past 6 months.      She denies any lower urinary tract complaints.     She denies any bowel related complaints, no fecal or flatal incontinence.     She has no other complaints.        From previous note  71-year-old with significant vaginal atrophy and vaginal erythema, pelvic floor weakness and pain, nocturia and mild daytime urgency, and chronic kidney disease with microscopic hematuria.     The patient is noted significant improvements in her vaginal irritation complaints. She is utilizing nystatin ointment and powder. She did not take her fluconazole as the \"nystatin was working\". She otherwise denies any abnormal vaginal bleeding or discharge. She is not utilizing her vaginal estrogen therapy.     She feels her bladder is \"working better\". She never utilized her Gemtesa therapy. She does note 2-3 episodes of nocturia which is not bothersome to her and notes daytime frequency roughly every 3 hours. She denies urge related incontinence. She does not desire any therapy for her bladder at this time.     We have previously discussed her voiding diary. This demonstrates a bladder capacity of 300 cc. There is no evidence of nocturnal polyuria. The patient does have 3 episodes of nocturia at low volumes roughly 100 cc. She is noted to have daytime frequency every hour to 2 hours. The patient is drinking roughly 1 L of water daily.     She has no other " "complaints.        From previous note  70-year-old presenting as referral from Dr. Yun with complaints of urinary frequency, nocturia, and vaginal and bladder pain.     Patient was followed by a urologist roughly 2 years ago and started on estrogen cream at that time. She is used that sporadically but is no longer using it at this time. She notes a roughly 4-month history of worsening urinary frequency and nocturia complaints with bladder pain. She notes pain \"all the time\" and significant irritation to the vaginal tissues. She denies any abnormal discharge or bleeding. She notes daytime frequency roughly every 3 hours. However she states that she is up every hour at night with low volumes and pain. She feels that she does not empty her bladder appropriately and has to strain significantly which sometimes causes a bowel movement. She denies enuresis. She denies any urinary leakage.     She did see infectious disease 3 months ago and was started on doxycycline for unclear reasons. She is no longer taking this medication.     She does have episodic constipation and takes MiraLAX for this. She otherwise denies any fecal or flatal incontinence.     She is not sexually active. She denies any bulge complaints.     She has no other complaints   Review of Systems  Constitutional: No fever, No chills and No fatigue.   Eyes: No vision problems and No dryness of the eyes.   ENT: No dry mouth, No hearing loss and No nosebleeds.   Cardiovascular: No chest pain, No palpitations and No orthopnea.   Respiratory: No shortness of breath, No cough and No wheezing.   Gastrointestinal: No abdominal pain, No constipation, No nausea, No diarrhea, No vomiting and No melena.   Genitourinary: As noted in HPI.   Musculoskeletal: No back pain, No myalgias, No muscle weakness, No joint swelling and No leg edema.   Integumentary: No rashes, No skin lesion and No itching.   Neurological: No headache, No numbness and No dizziness. "   Psychiatric: No sleep disturbances, No anxiety and No depression.   Endocrine: No hot flashes, No loss of hair and No hirsutism.   Hematologic/Lymphatic: No swollen glands, No tendency for easy bleeding and No tendency for easy bruising.   All other systems have been reviewed and are negative for complaint.        Objective   Physical Exam    PHYSICAL EXAMINATION:  No LMP recorded.  There is no height or weight on file to calculate BMI.  There were no vitals taken for this visit.  General Appearance: well appearing  Neuro: Alert and oriented   HEENT: mucous membranes moist, neck supple  Resp: No respiratory distress, normal work of breathing  MSK: normal range of motion, gait appropriate        Assessment/Plan      72-year-old with significant vaginal atrophy and vaginal erythema, concerns for lichen sclerosis, pelvic floor weakness and pain, nocturia and history of daytime urgency, chronic kidney disease with microscopic hematuria now on 3 times a week hemodialysis presenting with episodic dysuria concerns.     1. We again discussed the patient's vaginal complaints. She has had significant irritation associated with her vaginal estrogen cream. She appears to be having excellent results with her twice weekly Vagifem tablets but she has not been utilizing this recently.  She noted resolution of her present episodic dysuria concerns on this and a new prescription was sent into her pharmacy. She is also utilizing vaginal olive oil to help with irritative complaints with success.  Discussed the safety of utilizing topical emollients like Aquaphor.  She will also continue her clobetasol therapy twice a week moving forward.  She otherwise denies any abnormal vaginal discharge, foul smell, or irritation.     2. We again discussed the patient's lower urinary tract complaints.  Straight cath urine sample was obtained today with pending urine culture.  She has noted significant decreased urination associated with her  recent hemodialysis but does note episodic urethral burning as noted above with urination having stopped her Yuvafem. She has allergies to fluoroquinolones and has poor kidney function.  She did recently tolerate Bactrim therapy.  We have previously discussed her voiding diary which demonstrated a low capacity hypersensitive bladder with no evidence of nocturnal polyuria. We have previously discussed the [facility] care pathway including first, second, third line therapies. She unfortunately was intolerant of Myrbetriq as this made her feel unwell. She took Gemtesa once and felt this caused urinary retention but this was during the time of her UTI.  She has had significant benefits with her phenazopyridine and will continue this 1 tablet daily. Given her hemodialysis, this will be a safe option for her.    3. The patient has been noted to have microscopic hematuria in the past that is likely related to her significant vaginal atrophy and irritation complaints. She denies any gross hematuria complaints. She is presently on hemodialysis.     4. Patient will follow-up on an as needed basis moving forward.     DC Rizzo MD        Scribe Attestation  By signing my name below, I, Hola Rizzo MD, Scribe attest that this documentation has been prepared under the direction and in the presence of Hola Rizzo MD. All medical record entries made by the Scribe were at my direction or personally dictated by me. I have reviewed the chart and agree that the record accurately reflects my personal performance of the history, physical exam, discussion and plan.

## 2024-06-24 NOTE — PATIENT INSTRUCTIONS
Please continue your Yuvafem tablets 2-3 times weekly.    You will be contacted with the results of your urine should you require any therapy.    Please continue your clobetasol therapy 2 times weekly.    Please contact the clinic with any questions or concerns.    [phone]

## 2024-06-26 DIAGNOSIS — T82.590D MALFUNCTION OF ARTERIOVENOUS DIALYSIS FISTULA, SUBSEQUENT ENCOUNTER: Primary | ICD-10-CM

## 2024-06-26 LAB — BACTERIA UR CULT: NORMAL

## 2024-06-27 ENCOUNTER — TELEPHONE (OUTPATIENT)
Dept: PREOP | Facility: HOSPITAL | Age: 74
End: 2024-06-27
Payer: MEDICARE

## 2024-06-28 ENCOUNTER — HOSPITAL ENCOUNTER (OUTPATIENT)
Dept: CARDIOLOGY | Facility: HOSPITAL | Age: 74
Discharge: HOME | End: 2024-06-28
Payer: MEDICARE

## 2024-06-28 VITALS
SYSTOLIC BLOOD PRESSURE: 127 MMHG | DIASTOLIC BLOOD PRESSURE: 60 MMHG | RESPIRATION RATE: 14 BRPM | OXYGEN SATURATION: 100 % | HEART RATE: 64 BPM

## 2024-06-28 DIAGNOSIS — Z01.818 PREOPERATIVE TESTING: ICD-10-CM

## 2024-06-28 DIAGNOSIS — Z99.2 CKD (CHRONIC KIDNEY DISEASE) REQUIRING CHRONIC DIALYSIS (MULTI): ICD-10-CM

## 2024-06-28 DIAGNOSIS — T82.590D MALFUNCTION OF ARTERIOVENOUS DIALYSIS FISTULA, SUBSEQUENT ENCOUNTER: ICD-10-CM

## 2024-06-28 DIAGNOSIS — N18.6 CKD (CHRONIC KIDNEY DISEASE) REQUIRING CHRONIC DIALYSIS (MULTI): ICD-10-CM

## 2024-06-28 LAB
ANION GAP SERPL CALC-SCNC: 15 MMOL/L
BASOPHILS # BLD AUTO: 0.06 X10*3/UL (ref 0–0.1)
BASOPHILS NFR BLD AUTO: 1.7 %
BUN SERPL-MCNC: 43 MG/DL (ref 8–25)
CALCIUM SERPL-MCNC: 9.2 MG/DL (ref 8.5–10.4)
CHLORIDE SERPL-SCNC: 95 MMOL/L (ref 97–107)
CO2 SERPL-SCNC: 24 MMOL/L (ref 24–31)
CREAT SERPL-MCNC: 3.2 MG/DL (ref 0.4–1.6)
EGFRCR SERPLBLD CKD-EPI 2021: 15 ML/MIN/1.73M*2
EOSINOPHIL # BLD AUTO: 0.07 X10*3/UL (ref 0–0.4)
EOSINOPHIL NFR BLD AUTO: 2 %
ERYTHROCYTE [DISTWIDTH] IN BLOOD BY AUTOMATED COUNT: 14 % (ref 11.5–14.5)
EST. AVERAGE GLUCOSE BLD GHB EST-MCNC: 154 MG/DL
GLUCOSE SERPL-MCNC: 206 MG/DL (ref 65–99)
HBA1C MFR BLD: 7 %
HCT VFR BLD AUTO: 47.9 % (ref 36–46)
HGB BLD-MCNC: 15.8 G/DL (ref 12–16)
IMM GRANULOCYTES # BLD AUTO: 0 X10*3/UL (ref 0–0.5)
IMM GRANULOCYTES NFR BLD AUTO: 0 % (ref 0–0.9)
INR PPP: 1.2 (ref 0.9–1.2)
LYMPHOCYTES # BLD AUTO: 1.48 X10*3/UL (ref 0.8–3)
LYMPHOCYTES NFR BLD AUTO: 42.4 %
MCH RBC QN AUTO: 32.6 PG (ref 26–34)
MCHC RBC AUTO-ENTMCNC: 33 G/DL (ref 32–36)
MCV RBC AUTO: 99 FL (ref 80–100)
MONOCYTES # BLD AUTO: 0.31 X10*3/UL (ref 0.05–0.8)
MONOCYTES NFR BLD AUTO: 8.9 %
NEUTROPHILS # BLD AUTO: 1.57 X10*3/UL (ref 1.6–5.5)
NEUTROPHILS NFR BLD AUTO: 45 %
NRBC BLD-RTO: 0 /100 WBCS (ref 0–0)
PLATELET # BLD AUTO: 106 X10*3/UL (ref 150–450)
POTASSIUM SERPL-SCNC: 4.8 MMOL/L (ref 3.4–5.1)
PROTHROMBIN TIME: 13 SECONDS (ref 9.3–12.7)
RBC # BLD AUTO: 4.84 X10*6/UL (ref 4–5.2)
RBC MORPH BLD: NORMAL
SODIUM SERPL-SCNC: 134 MMOL/L (ref 133–145)
WBC # BLD AUTO: 3.5 X10*3/UL (ref 4.4–11.3)

## 2024-06-28 PROCEDURE — 2500000004 HC RX 250 GENERAL PHARMACY W/ HCPCS (ALT 636 FOR OP/ED): Performed by: RADIOLOGY

## 2024-06-28 PROCEDURE — 7100000010 HC PHASE TWO TIME - EACH INCREMENTAL 1 MINUTE

## 2024-06-28 PROCEDURE — 85025 COMPLETE CBC W/AUTO DIFF WBC: CPT | Performed by: SURGERY

## 2024-06-28 PROCEDURE — C1725 CATH, TRANSLUMIN NON-LASER: HCPCS

## 2024-06-28 PROCEDURE — C1894 INTRO/SHEATH, NON-LASER: HCPCS

## 2024-06-28 PROCEDURE — 7100000009 HC PHASE TWO TIME - INITIAL BASE CHARGE

## 2024-06-28 PROCEDURE — 83036 HEMOGLOBIN GLYCOSYLATED A1C: CPT | Performed by: RADIOLOGY

## 2024-06-28 PROCEDURE — 80048 BASIC METABOLIC PNL TOTAL CA: CPT | Performed by: SURGERY

## 2024-06-28 PROCEDURE — 76937 US GUIDE VASCULAR ACCESS: CPT

## 2024-06-28 PROCEDURE — 85610 PROTHROMBIN TIME: CPT | Performed by: SURGERY

## 2024-06-28 PROCEDURE — C1893 INTRO/SHEATH, FIXED,NON-PEEL: HCPCS

## 2024-06-28 PROCEDURE — C1769 GUIDE WIRE: HCPCS

## 2024-06-28 PROCEDURE — C1887 CATHETER, GUIDING: HCPCS

## 2024-06-28 PROCEDURE — 2720000007 HC OR 272 NO HCPCS

## 2024-06-28 PROCEDURE — 36415 COLL VENOUS BLD VENIPUNCTURE: CPT | Performed by: SURGERY

## 2024-06-28 PROCEDURE — 36907 BALO ANGIOP CTR DIALYSIS SEG: CPT

## 2024-06-28 PROCEDURE — 2550000001 HC RX 255 CONTRASTS: Performed by: RADIOLOGY

## 2024-06-28 PROCEDURE — 36901 INTRO CATH DIALYSIS CIRCUIT: CPT

## 2024-06-28 PROCEDURE — 2500000005 HC RX 250 GENERAL PHARMACY W/O HCPCS: Performed by: RADIOLOGY

## 2024-06-28 RX ORDER — LIDOCAINE HYDROCHLORIDE AND EPINEPHRINE 10; 10 MG/ML; UG/ML
INJECTION, SOLUTION INFILTRATION; PERINEURAL AS NEEDED
Status: DISCONTINUED | OUTPATIENT
Start: 2024-06-28 | End: 2024-06-28 | Stop reason: HOSPADM

## 2024-06-28 RX ORDER — MORPHINE SULFATE 4 MG/ML
INJECTION, SOLUTION INTRAMUSCULAR; INTRAVENOUS AS NEEDED
Status: DISCONTINUED | OUTPATIENT
Start: 2024-06-28 | End: 2024-06-28 | Stop reason: HOSPADM

## 2024-06-28 RX ORDER — HEPARIN SODIUM 1000 [USP'U]/ML
INJECTION, SOLUTION INTRAVENOUS; SUBCUTANEOUS AS NEEDED
Status: DISCONTINUED | OUTPATIENT
Start: 2024-06-28 | End: 2024-06-28 | Stop reason: HOSPADM

## 2024-06-28 RX ORDER — MIDAZOLAM HYDROCHLORIDE 1 MG/ML
INJECTION, SOLUTION INTRAMUSCULAR; INTRAVENOUS AS NEEDED
Status: DISCONTINUED | OUTPATIENT
Start: 2024-06-28 | End: 2024-06-28 | Stop reason: HOSPADM

## 2024-06-28 RX ORDER — IODIXANOL 320 MG/ML
INJECTION, SOLUTION INTRAVASCULAR AS NEEDED
Status: DISCONTINUED | OUTPATIENT
Start: 2024-06-28 | End: 2024-06-28 | Stop reason: HOSPADM

## 2024-06-28 RX ORDER — ONDANSETRON HYDROCHLORIDE 2 MG/ML
INJECTION, SOLUTION INTRAVENOUS AS NEEDED
Status: DISCONTINUED | OUTPATIENT
Start: 2024-06-28 | End: 2024-06-28 | Stop reason: HOSPADM

## 2024-06-28 RX ORDER — HYDRALAZINE HYDROCHLORIDE 20 MG/ML
INJECTION INTRAMUSCULAR; INTRAVENOUS AS NEEDED
Status: DISCONTINUED | OUTPATIENT
Start: 2024-06-28 | End: 2024-06-28 | Stop reason: HOSPADM

## 2024-06-28 ASSESSMENT — PAIN SCALES - GENERAL
PAINLEVEL_OUTOF10: 0 - NO PAIN
PAINLEVEL_OUTOF10: 0 - NO PAIN

## 2024-06-28 ASSESSMENT — PAIN - FUNCTIONAL ASSESSMENT
PAIN_FUNCTIONAL_ASSESSMENT: 0-10
PAIN_FUNCTIONAL_ASSESSMENT: 0-10

## 2024-06-28 ASSESSMENT — COLUMBIA-SUICIDE SEVERITY RATING SCALE - C-SSRS
6. HAVE YOU EVER DONE ANYTHING, STARTED TO DO ANYTHING, OR PREPARED TO DO ANYTHING TO END YOUR LIFE?: NO
2. HAVE YOU ACTUALLY HAD ANY THOUGHTS OF KILLING YOURSELF?: NO
1. IN THE PAST MONTH, HAVE YOU WISHED YOU WERE DEAD OR WISHED YOU COULD GO TO SLEEP AND NOT WAKE UP?: NO

## 2024-07-01 ENCOUNTER — ANTICOAGULATION - WARFARIN VISIT (OUTPATIENT)
Dept: CARDIOLOGY | Facility: HOSPITAL | Age: 74
End: 2024-07-01
Payer: MEDICARE

## 2024-07-01 DIAGNOSIS — I48.20 CHRONIC ATRIAL FIBRILLATION (MULTI): ICD-10-CM

## 2024-07-01 LAB
POC INR: 1.6
POC PROTHROMBIN TIME: NORMAL

## 2024-07-01 PROCEDURE — 99211 OFF/OP EST MAY X REQ PHY/QHP: CPT | Performed by: FAMILY MEDICINE

## 2024-07-01 PROCEDURE — 85610 PROTHROMBIN TIME: CPT | Mod: QW

## 2024-07-01 NOTE — PROGRESS NOTES
Patient identification verified with 2 identifiers.    Location: [facility] - 1st Floor Anticoagulation Clinic [address] [id number]    Referring Physician: DR Bonilla  Enrollment/ Re-enrollment date: 2024   INR Goal: 2.0-3.0  INR monitoring is per [facility] protocol.  Anticoagulation Medication: warfarin  Indication: Atrial Fibrillation/Atrial Flutter    Subjective   Bleeding signs/symptoms:      Bruising:     Major bleeding event:    Thrombosis signs/symptoms:    Thromboembolic event:    Missed doses:    Extra doses:    Medication changes:    Dietary changes:    Change in health:    Change in activity:    Alcohol:    Other concerns:      Upcoming Procedures:  Does the Patient Have any upcoming procedures that require interruption in anticoagulation therapy? no  Does the patient require bridging? no      Anticoagulation Summary  As of 7/1/2024      INR goal:  2.0-3.0   TTR:  42.7% (3.3 mo)   INR used for dosing:                 Assessment/Plan   Therapeutic     1. New dose: no change    2. Next INR: 1 month      Education provided to patient during the visit:  Patient instructed to call in interim with questions, concerns and changes.

## 2024-07-19 ENCOUNTER — OFFICE VISIT (OUTPATIENT)
Dept: VASCULAR SURGERY | Facility: CLINIC | Age: 74
End: 2024-07-19
Payer: MEDICARE

## 2024-07-19 VITALS — RESPIRATION RATE: 18 BRPM | HEART RATE: 69 BPM | SYSTOLIC BLOOD PRESSURE: 110 MMHG | DIASTOLIC BLOOD PRESSURE: 60 MMHG

## 2024-07-19 DIAGNOSIS — T82.858S: ICD-10-CM

## 2024-07-19 DIAGNOSIS — Z99.2 CKD (CHRONIC KIDNEY DISEASE) REQUIRING CHRONIC DIALYSIS (MULTI): Primary | ICD-10-CM

## 2024-07-19 DIAGNOSIS — N18.6 CKD (CHRONIC KIDNEY DISEASE) REQUIRING CHRONIC DIALYSIS (MULTI): Primary | ICD-10-CM

## 2024-07-19 PROCEDURE — 1157F ADVNC CARE PLAN IN RCRD: CPT | Performed by: NURSE PRACTITIONER

## 2024-07-19 PROCEDURE — 1126F AMNT PAIN NOTED NONE PRSNT: CPT | Performed by: NURSE PRACTITIONER

## 2024-07-19 PROCEDURE — 3074F SYST BP LT 130 MM HG: CPT | Performed by: NURSE PRACTITIONER

## 2024-07-19 PROCEDURE — 99213 OFFICE O/P EST LOW 20 MIN: CPT | Performed by: NURSE PRACTITIONER

## 2024-07-19 PROCEDURE — 1159F MED LIST DOCD IN RCRD: CPT | Performed by: NURSE PRACTITIONER

## 2024-07-19 PROCEDURE — 3078F DIAST BP <80 MM HG: CPT | Performed by: NURSE PRACTITIONER

## 2024-07-19 PROCEDURE — 4010F ACE/ARB THERAPY RXD/TAKEN: CPT | Performed by: NURSE PRACTITIONER

## 2024-07-19 PROCEDURE — 3051F HG A1C>EQUAL 7.0%<8.0%: CPT | Performed by: NURSE PRACTITIONER

## 2024-07-19 ASSESSMENT — PAIN SCALES - GENERAL: PAINLEVEL: 0-NO PAIN

## 2024-07-19 ASSESSMENT — ENCOUNTER SYMPTOMS
DEPRESSION: 0
LOSS OF SENSATION IN FEET: 1
OCCASIONAL FEELINGS OF UNSTEADINESS: 1

## 2024-07-19 ASSESSMENT — LIFESTYLE VARIABLES
HOW MANY STANDARD DRINKS CONTAINING ALCOHOL DO YOU HAVE ON A TYPICAL DAY: PATIENT DOES NOT DRINK
HOW OFTEN DO YOU HAVE A DRINK CONTAINING ALCOHOL: NEVER
SKIP TO QUESTIONS 9-10: 1
AUDIT-C TOTAL SCORE: 0
HOW OFTEN DO YOU HAVE SIX OR MORE DRINKS ON ONE OCCASION: NEVER

## 2024-07-19 ASSESSMENT — PATIENT HEALTH QUESTIONNAIRE - PHQ9
1. LITTLE INTEREST OR PLEASURE IN DOING THINGS: NOT AT ALL
SUM OF ALL RESPONSES TO PHQ9 QUESTIONS 1 AND 2: 0
2. FEELING DOWN, DEPRESSED OR HOPELESS: NOT AT ALL

## 2024-07-19 NOTE — PATIENT INSTRUCTIONS
Eli,     It was nice to meet you!  Your fistula has a strong thrill and bruit. It is soft and not hard which indicates it is working better and that the procedure worked. Please begin virgin fistula protocol at dialysis.

## 2024-07-22 ENCOUNTER — ANTICOAGULATION - WARFARIN VISIT (OUTPATIENT)
Dept: CARDIOLOGY | Facility: HOSPITAL | Age: 74
End: 2024-07-22
Payer: MEDICARE

## 2024-07-22 DIAGNOSIS — I48.20 CHRONIC ATRIAL FIBRILLATION (MULTI): ICD-10-CM

## 2024-07-22 LAB
POC INR: 1.3
POC PROTHROMBIN TIME: NORMAL

## 2024-07-22 PROCEDURE — 99211 OFF/OP EST MAY X REQ PHY/QHP: CPT | Performed by: FAMILY MEDICINE

## 2024-07-22 PROCEDURE — 85610 PROTHROMBIN TIME: CPT | Mod: QW

## 2024-07-22 NOTE — PROGRESS NOTES
Patient identification verified with 2 identifiers.    Location: [facility] [place] - 1st Floor Anticoagulation Clinic [address] [id number]    Referring Physician: DR [email or web address]  Enrollment/ Re-enrollment date:    INR Goal: 2.0-3.0  INR monitoring is per [facility] protocol.  Anticoagulation Medication: warfarin  Indication: Atrial Fibrillation/Atrial Flutter    Subjective   Bleeding signs/symptoms: No    Bruising: No   Major bleeding event: No  Thrombosis signs/symptoms: No  Thromboembolic event: No  Missed doses: Yes  has been off for 4 days for a planned dental extractions this afternoon  Extra doses: No  Medication changes: No  Dietary changes: No  Change in health: No  Change in activity: No  Alcohol: No  Other concerns: No    Upcoming Procedures:  Does the Patient Have any upcoming procedures that require interruption in anticoagulation therapy? no  Does the patient require bridging? no      Anticoagulation Summary  As of 2024      INR goal:  2.0-3.0   TTR:  34.5% (4.1 mo)   INR used for dosin.30 (2024)   Weekly warfarin total:  22.5 mg               Assessment/Plan   Subtherapeutic     1. New dose: no change    2. Next INR: 1 month      Education provided to patient during the visit:  Patient instructed to call in interim with questions, concerns and changes.

## 2024-07-26 ENCOUNTER — APPOINTMENT (OUTPATIENT)
Dept: UROLOGY | Facility: CLINIC | Age: 74
End: 2024-07-26
Payer: MEDICARE

## 2024-07-28 NOTE — PROGRESS NOTES
History Of Present Illness  Eli Zavala is a 73 y.o. female presenting with status post fistulogram 6/28/24 by Dr. Escalona.  She was noted to have high-grade stenosis at the central portion of the left innominate vein with well-developed collaterals.  The lesion was balloon angioplastied.  The patient has not yet started using her fistula for dialysis.  She wonders if she is able to use it now.     Past Medical History  She has a past medical history of Atherosclerotic heart disease of native coronary artery without angina pectoris, Chronic kidney disease, stage 3 unspecified (Multi), Hyperuricemia without signs of inflammatory arthritis and tophaceous disease, Personal history of diseases of the blood and blood-forming organs and certain disorders involving the immune mechanism, Personal history of Hodgkin lymphoma, Personal history of other diseases of the circulatory system, Personal history of other diseases of the digestive system, Personal history of other diseases of the musculoskeletal system and connective tissue, Personal history of other endocrine, nutritional and metabolic disease, Personal history of other endocrine, nutritional and metabolic disease (05/03/2021), Personal history of other endocrine, nutritional and metabolic disease, Personal history of other endocrine, nutritional and metabolic disease, Personal history of other specified conditions, and Type 2 diabetes mellitus with other diabetic kidney complication (Multi).    Surgical History  She has a past surgical history that includes Other surgical history (09/16/2021); Tonsillectomy (08/21/2014); Sinus surgery (08/21/2014); Cholecystectomy (03/11/2014); MR angio neck wo IV contrast (8/16/2018); and MR angio head wo IV contrast (8/16/2018).     Social History  She reports that she has never smoked. She has never been exposed to tobacco smoke. She has never used smokeless tobacco. She reports that she does not currently use alcohol. She  reports that she does not use drugs.    Family History  No family history on file.     Allergies  Levofloxacin, Atorvastatin, Fentanyl, Heparin (porcine), Sulfamethoxazole-trimethoprim, Adhesive tape-silicones, Amoxicillin, Omeprazole, Thiopental, and Sulfa (sulfonamide antibiotics)    Review of Systems    CONSTITUTIONAL: Denies weight loss, fever and chills.    HEENT: Denies changes in vision and hearing.    RESPIRATORY: Denies SOB and cough.    CV: Denies palpitations and CP.    GI: Denies abdominal pain, nausea, vomiting and diarrhea.    : Denies dysuria and urinary frequency.    MSK: Denies myalgia and joint pain.    SKIN: Denies rash and pruritus.    VASC: Denies claudication, ischemic rest pain, or open wounds or sores    NEUROLOGICAL: Denies headache and syncope.    PSYCHIATRIC: Denies recent changes in mood. Denies anxiety and depression.     Physical Exam    General: Pt is alert and oriented x 3. Pleasant, conversive  HEENT: Head is atraumatic, normocephalic. PERRL. No cervical bruits  Cardiac: Normal S1-S2.  Regular rate and rhythm.  No murmurs.  Respiratory: Lungs clear to auscultation.  No adventitious sounds.  Abdomen: Soft, nondistended, nontender.  Bowel sounds x4 quadrants.  Pulse exam: Palpable brachial and radial pulses bilaterall.   femoral, popliteal, pedal pulses are palpable bilaterally.  Extremities: Left brachiocephalic arteriovenous fistula has a strong thrill and audible bruit.  The fistula is soft and not taut.  No evidence of high pressure noted.  Neuro: Moves all extremities spontaneously.  No focal deficits.  Psych: Appropriate affect.  Answers questions appropriately.     Last Recorded Vitals  /60   Pulse 69   Resp 18     Relevant Results    Current Outpatient Medications   Medication Instructions    acetaminophen (TYLENOL) 650 mg, oral, Every 6 hours PRN    amLODIPine (Norvasc) 10 mg tablet oral    atorvastatin (LIPITOR) 40 mg, oral, Daily    azithromycin (ZITHROMAX) 500 mg,  "oral, Daily    BD AutoShield Duo Pen Needle 30 gauge x 3/16\" needle MAY USE TO INJECT UP TO FOUR TIMES DAILY    butalbital-acetaminophen-caff -40 mg tablet oral    calcium carbonate (Tums) 200 mg calcium chewable tablet oral, Every 12 hours PRN    carvedilol (COREG) 12.5 mg, oral, 2 times daily    carvedilol (COREG) 50 mg, oral, 2 times daily (morning and late afternoon)    cefadroxil (Duricef) 500 mg capsule TAKE 1 CAPSULE TWICE DAILY THE FIRST DAY THEN 1 CAPSULE DAILY    cefdinir (OMNICEF) 300 mg, oral, Every 12 hours    chlorhexidine (Peridex) 0.12 % solution RINSE AND SPIT TWICE A DAY 1/2 OZ FOR 30 SECONDS, START DAY AFTER SX    clobetasol (Temovate) 0.05 % cream Apply to affected vaginal area 2 times a week    clotrimazole (Lotrimin) 1 % cream APPLY TOPICALLY TO AFFECTED AREA 2 TIMES A DAY FOR 7 DAYS    dextrose 15 gram/32 mL gel in packet oral    diclofenac (Voltaren) 0.1 % ophthalmic solution 1 DROP IN AFFECTED EYES 4 TIMES A DAY AS NEEDED    estradiol (YUVAFEM) 10 mcg, vaginal, 3 times weekly, At night    fluconazole (Diflucan) 100 mg tablet TAKE 1 TABLET (100 MG) BY ORAL ROUTE ONCE DAILY. DO NOT TAKE ZOFRAN FOR 14 DAYS    fluocinolone (DermOtic) 0.01 % ear drops INSTIL 4 DROPS TO AFFECTED EAR(S) TWICE A DAY FOR 7 DAYS AND AS NEEDED    FreeStyle Ron 2 [place] USE AS DIRECTED    FreeStyle Ron 2 Sensor kit     furosemide (LASIX) 20 mg, oral, Daily    furosemide (LASIX) 40 mg, oral, Daily    gabapentin (NEURONTIN) 100 mg, oral, 2 times daily    Gemtesa 75 mg tablet 1 tablet, oral, Daily    hydrALAZINE (Apresoline) 25 mg tablet oral    hydrALAZINE (Apresoline) 50 mg tablet 2 tablets, oral, 3 times daily    hydrALAZINE (APRESOLINE) 30 mg, oral, 2 times daily, With food    insulin glargine (LANTUS) 10 Units, subcutaneous, Nightly    insulin lispro (HumaLOG KwikPen Insulin) 100 unit/mL injection subcutaneous, INJECT ACCORDING TO SLIDING SCALE ( 4 18 UNITS BEFORE MEALS)    isosorbide mononitrate ER " (IMDUR) 30 mg, oral, Daily    ketoconazole (NIZOral) 2 % cream Topical, 2 times daily    lamoTRIgine (LAMICTAL) 100 mg, oral, 2 times daily    levETIRAcetam (Keppra) 500 mg tablet Oral for 90    levothyroxine (SYNTHROID) 75 mcg, oral, Daily before breakfast    losartan (Cozaar) 100 mg tablet oral    metoprolol tartrate (LOPRESSOR) 25 mg, oral, 2 times daily    niacin 50 mg, oral, Daily    nitroglycerin (Nitrodur) 0.4 mg/hr patch transdermal    nystatin (Mycostatin) cream 1 Application, Topical, 2 times daily    oxyCODONE-acetaminophen (Percocet) 5-325 mg tablet 1 tablet, oral, Every 6 hours PRN    pantoprazole (PROTONIX) 40 mg, oral, Daily    polyethylene glycol-electrolytes (polyethylene glycol) 420 gram solution As directed for outpatient colonoscopy    prednisoLONE acetate (Pred-Forte) 1 % ophthalmic suspension INSTILL 1 DROP INTO RIGHT EYE 4 TIMES A DAY    Purelax 17 gram/dose powder DISSOLVE 17 GM(S) IN 8 OUNCES OF WATER AND DRINK DAILY    Repatha SureClick 140 mg, subcutaneous, Every 14 days    sennosides-docusate sodium (Senokot-S) 8.6-50 mg tablet 1 tablet, oral, Daily RT    sodium bicarbonate 650 mg tablet TAKE 2 TABLETS BY MOUTH EVERY DAY FOR 90 DAYS    Stool Softener 100 mg, oral, 2 times daily    torsemide (Demadex) 100 mg tablet 0.5 tablets, oral, Daily    warfarin (COUMADIN) 2.5 mg, oral, every Monday, Wednesday, Friday    warfarin (COUMADIN) 5 mg, oral, every Tuesday, Thursday, Saturday, Sunday           Assessment/Plan   End-stage renal disease requiring hemodialysis  Stenosis of arteriovenous fistula, status post fistulogram    Patient is status post fistulogram by interventional radiology.  The fistula clinically feels soft and is quite prominent and ready to use.  No evidence of high pressure.  I recommend that the patient begin using the AV fistula for dialysis, virgin fistula protocol.           Mehul Castellanos, APRN-CNP

## 2024-08-02 ENCOUNTER — TELEPHONE (OUTPATIENT)
Dept: VASCULAR SURGERY | Facility: CLINIC | Age: 74
End: 2024-08-02
Payer: MEDICARE

## 2024-08-02 NOTE — TELEPHONE ENCOUNTER
Patient called and stated that her dialysis access infiltrated yesterday, spoke with Leslie Rosas CNP and ok to try again tomorrow.  Patient to call back if it continues to infiltrate.  Voiced understanding.

## 2024-08-14 ENCOUNTER — TELEPHONE (OUTPATIENT)
Dept: VASCULAR SURGERY | Facility: CLINIC | Age: 74
End: 2024-08-14
Payer: MEDICARE

## 2024-08-14 NOTE — TELEPHONE ENCOUNTER
Received message to call patient regarding her fistula, attempted to call patient.  No answer so message left for patient to call if she still has questions.

## 2024-08-15 ENCOUNTER — TELEPHONE (OUTPATIENT)
Dept: VASCULAR SURGERY | Facility: CLINIC | Age: 74
End: 2024-08-15
Payer: MEDICARE

## 2024-08-15 DIAGNOSIS — N18.6 ESRD ON DIALYSIS (MULTI): Primary | ICD-10-CM

## 2024-08-15 DIAGNOSIS — Z99.2 ESRD ON DIALYSIS (MULTI): Primary | ICD-10-CM

## 2024-08-15 NOTE — TELEPHONE ENCOUNTER
Patient called and stated that fistula has infiltrated 4 times and her arm is bruised.  Dialysis staff are now using her tunneled catheter.  Message sent to Kevan Castellanos CNP for further instruction.

## 2024-08-16 ENCOUNTER — ANCILLARY PROCEDURE (OUTPATIENT)
Dept: VASCULAR MEDICINE | Facility: CLINIC | Age: 74
End: 2024-08-16
Payer: MEDICARE

## 2024-08-16 DIAGNOSIS — Z99.2 ESRD ON DIALYSIS (MULTI): ICD-10-CM

## 2024-08-16 DIAGNOSIS — T82.858A STENOSIS OF OTHER VASCULAR PROSTHETIC DEVICES, IMPLANTS AND GRAFTS, INITIAL ENCOUNTER (CMS-HCC): ICD-10-CM

## 2024-08-16 DIAGNOSIS — N18.6 ESRD ON DIALYSIS (MULTI): ICD-10-CM

## 2024-08-16 PROCEDURE — 93990 DOPPLER FLOW TESTING: CPT

## 2024-08-16 PROCEDURE — 93990 DOPPLER FLOW TESTING: CPT | Performed by: INTERNAL MEDICINE

## 2024-08-26 ENCOUNTER — APPOINTMENT (OUTPATIENT)
Dept: VASCULAR SURGERY | Facility: CLINIC | Age: 74
End: 2024-08-26
Payer: MEDICARE

## 2024-08-26 ENCOUNTER — ANTICOAGULATION - WARFARIN VISIT (OUTPATIENT)
Dept: CARDIOLOGY | Facility: HOSPITAL | Age: 74
End: 2024-08-26
Payer: MEDICARE

## 2024-08-26 DIAGNOSIS — I48.20 CHRONIC ATRIAL FIBRILLATION (MULTI): ICD-10-CM

## 2024-08-26 LAB
POC INR: 1.3
POC PROTHROMBIN TIME: NORMAL

## 2024-08-26 PROCEDURE — 85610 PROTHROMBIN TIME: CPT | Mod: QW

## 2024-08-26 PROCEDURE — 99211 OFF/OP EST MAY X REQ PHY/QHP: CPT | Performed by: FAMILY MEDICINE

## 2024-08-26 NOTE — PROGRESS NOTES
Patient identification verified with 2 identifiers.    Location: [facility] - 1st Floor Anticoagulation Clinic [address] [address]    Referring Physician: DR Bonilla  Enrollment/ Re-enrollment date: 2024   INR Goal: 2.0-3.0  INR monitoring is per [facility] protocol.  Anticoagulation Medication: warfarin  Indication: Atrial Fibrillation/Atrial Flutter    Subjective   Bleeding signs/symptoms: No    Bruising: No   Major bleeding event: No  Thrombosis signs/symptoms: No  Thromboembolic event: No  Missed doses: No  Extra doses: No  Medication changes: No  Dietary changes: No  Change in health: No  Change in activity: No  Alcohol: No  Other concerns: No    Upcoming Procedures:  Does the Patient Have any upcoming procedures that require interruption in anticoagulation therapy? no  Does the patient require bridging? no      Anticoagulation Summary  As of 8/26/2024      INR goal:  2.0-3.0   TTR:  34.5% (4.1 mo)   INR used for dosing:  --   Weekly warfarin total:  22.5 mg               Assessment/Plan   Therapeutic     1. New dose: no change    2. Next INR: 1 month      Education provided to patient during the visit:  Patient instructed to call in interim with questions, concerns and changes.

## 2024-09-09 ENCOUNTER — OFFICE VISIT (OUTPATIENT)
Dept: VASCULAR SURGERY | Facility: CLINIC | Age: 74
End: 2024-09-09
Payer: MEDICARE

## 2024-09-09 VITALS — DIASTOLIC BLOOD PRESSURE: 73 MMHG | SYSTOLIC BLOOD PRESSURE: 144 MMHG | RESPIRATION RATE: 18 BRPM | HEART RATE: 64 BPM

## 2024-09-09 DIAGNOSIS — Z99.2 STAGE 5 CHRONIC KIDNEY DISEASE ON CHRONIC DIALYSIS (MULTI): ICD-10-CM

## 2024-09-09 DIAGNOSIS — T82.590A MALFUNCTION OF ARTERIOVENOUS DIALYSIS FISTULA, INITIAL ENCOUNTER (CMS-HCC): Primary | ICD-10-CM

## 2024-09-09 DIAGNOSIS — N18.6 STAGE 5 CHRONIC KIDNEY DISEASE ON CHRONIC DIALYSIS (MULTI): ICD-10-CM

## 2024-09-09 PROCEDURE — 99212 OFFICE O/P EST SF 10 MIN: CPT | Performed by: SURGERY

## 2024-09-09 PROCEDURE — 3077F SYST BP >= 140 MM HG: CPT | Performed by: SURGERY

## 2024-09-09 PROCEDURE — 4010F ACE/ARB THERAPY RXD/TAKEN: CPT | Performed by: SURGERY

## 2024-09-09 PROCEDURE — 1159F MED LIST DOCD IN RCRD: CPT | Performed by: SURGERY

## 2024-09-09 PROCEDURE — 3078F DIAST BP <80 MM HG: CPT | Performed by: SURGERY

## 2024-09-09 PROCEDURE — 3051F HG A1C>EQUAL 7.0%<8.0%: CPT | Performed by: SURGERY

## 2024-09-09 PROCEDURE — 1157F ADVNC CARE PLAN IN RCRD: CPT | Performed by: SURGERY

## 2024-09-09 PROCEDURE — 1036F TOBACCO NON-USER: CPT | Performed by: SURGERY

## 2024-09-09 PROCEDURE — 1126F AMNT PAIN NOTED NONE PRSNT: CPT | Performed by: SURGERY

## 2024-09-09 ASSESSMENT — ENCOUNTER SYMPTOMS
LOSS OF SENSATION IN FEET: 1
DEPRESSION: 0
OCCASIONAL FEELINGS OF UNSTEADINESS: 1

## 2024-09-09 ASSESSMENT — LIFESTYLE VARIABLES
HOW OFTEN DO YOU HAVE SIX OR MORE DRINKS ON ONE OCCASION: NEVER
HOW OFTEN DO YOU HAVE A DRINK CONTAINING ALCOHOL: NEVER
HOW MANY STANDARD DRINKS CONTAINING ALCOHOL DO YOU HAVE ON A TYPICAL DAY: PATIENT DOES NOT DRINK
AUDIT-C TOTAL SCORE: 0
SKIP TO QUESTIONS 9-10: 1

## 2024-09-09 ASSESSMENT — PAIN SCALES - GENERAL: PAINLEVEL: 0-NO PAIN

## 2024-09-09 NOTE — H&P (VIEW-ONLY)
Patient has had no access on her left arm fistula as they have infiltrated this repeatedly.  She does have a challenging arm as her tissues are loose and the fistula moves around.  Infiltrations and so-called pulling clot have to do with the needle leaving the flow lumen.  When the fistula is occluded with a finger above the intended access point there is nice distention of the fistula which should make this fistula easily accessible.  I have marked multiple points on the fistula that should be accessed.  This was with an indelible marker and the patient was given the marker to use to remark this fistula.  I will set her up for fistulogram.    I had situations where the access centers technologist are not able to access a perfectly good superficial fistula because they just cannot.  The options here are to change for dialysis access centers or to put in a graft which is what they want as the grafts are visible and easily accessible compared to fistulas.  While this is an inferior option to a fistula, this is the reality and this may be what this patient will ultimately need.

## 2024-09-16 ENCOUNTER — TELEPHONE (OUTPATIENT)
Dept: VASCULAR SURGERY | Facility: CLINIC | Age: 74
End: 2024-09-16
Payer: MEDICARE

## 2024-09-16 DIAGNOSIS — Z01.818 PRE-OP TESTING: Primary | ICD-10-CM

## 2024-09-16 PROBLEM — N18.6 STAGE 5 CHRONIC KIDNEY DISEASE ON CHRONIC DIALYSIS (MULTI): Status: ACTIVE | Noted: 2024-09-09

## 2024-09-16 PROBLEM — Z99.2 STAGE 5 CHRONIC KIDNEY DISEASE ON CHRONIC DIALYSIS (MULTI): Status: ACTIVE | Noted: 2024-09-09

## 2024-09-16 NOTE — TELEPHONE ENCOUNTER
Pre op instructions given to patient via phone, instructed to stop coumadin 5 days prior to surgery, nothing to eat or drink after midnight night before surgery and have labs drawn prior to surgery.  Instructed may take medications with small sip of water.  Voiced understanding.

## 2024-09-18 NOTE — H&P
History Of Present Illness  Eli Zavala is a 73 y.o. female presenting with ESRD with malfunctioning of arteriovenous dialysis fistula. Previous fistulogram done 6/28/2024. Here for fistulogram with declot.  PMH includes ESRD, angina, CAD, atrial fib, HTN, chf, HLD, CVA,   Reports last dose of Warfarin 4days ago.   Past Medical History:  Past Medical History:   Diagnosis Date    Atherosclerotic heart disease of native coronary artery without angina pectoris     Coronary heart disease    Chronic kidney disease, stage 3 unspecified (Multi)     Chronic kidney disease, stage III (moderate)    Hyperuricemia without signs of inflammatory arthritis and tophaceous disease     Asymptomatic hyperuricemia    Personal history of diseases of the blood and blood-forming organs and certain disorders involving the immune mechanism     History of anemia    Personal history of Hodgkin lymphoma     History of Hodgkin's lymphoma    Personal history of other diseases of the circulatory system     History of essential hypertension    Personal history of other diseases of the digestive system     History of gastroesophageal reflux (GERD)    Personal history of other diseases of the musculoskeletal system and connective tissue     Personal history of arthritis    Personal history of other endocrine, nutritional and metabolic disease     History of hypothyroidism    Personal history of other endocrine, nutritional and metabolic disease 05/03/2021    History of type 2 diabetes mellitus    Personal history of other endocrine, nutritional and metabolic disease     History of hypercholesterolemia    Personal history of other endocrine, nutritional and metabolic disease     History of thyroid disease    Personal history of other specified conditions     History of shortness of breath    Type 2 diabetes mellitus with other diabetic kidney complication (Multi)     Type 2 diabetes mellitus with renal manifestations        Past Surgical  "History:  Past Surgical History:   Procedure Laterality Date    CHOLECYSTECTOMY  03/11/2014    Cholecystectomy Laparoscopic    MR HEAD ANGIO WO IV CONTRAST  8/16/2018    MR HEAD ANGIO WO IV CONTRAST [facility]    MR NECK ANGIO WO IV CONTRAST  8/16/2018    MR NECK ANGIO WO IV CONTRAST [facility]    OTHER SURGICAL HISTORY  09/16/2021    Hysterectomy    SINUS SURGERY  08/21/2014    Sinus Surgery    TONSILLECTOMY  08/21/2014    Tonsillectomy          Social History:   reports that she has never smoked. She has never been exposed to tobacco smoke. She has never used smokeless tobacco. She reports that she does not currently use alcohol. She reports that she does not use drugs.     Family History:  No family history on file.     Allergies:  Allergies   Allergen Reactions    Levofloxacin Itching and Unknown    Atorvastatin Other and Unknown    Fentanyl Nausea/vomiting    Heparin (Porcine) Nausea/vomiting    Sulfamethoxazole-Trimethoprim Other and Unknown    Adhesive Tape-Silicones Unknown    Amoxicillin Unknown    Omeprazole Unknown    Thiopental Unknown    Sulfa (Sulfonamide Antibiotics) Itching and Unknown        Home Medications:  Current Outpatient Medications   Medication Instructions    acetaminophen (TYLENOL) 650 mg, oral, Every 6 hours PRN    amLODIPine (Norvasc) 10 mg tablet oral    atorvastatin (LIPITOR) 40 mg, oral, Daily    azithromycin (ZITHROMAX) 500 mg, oral, Daily    BD AutoShield Duo Pen Needle 30 gauge x 3/16\" needle MAY USE TO INJECT UP TO FOUR TIMES DAILY    butalbital-acetaminophen-caff -40 mg tablet oral    calcium carbonate (Tums) 200 mg calcium chewable tablet oral, Every 12 hours PRN    carvedilol (COREG) 12.5 mg, oral, 2 times daily    carvedilol (COREG) 25 mg, oral, 2 times daily (morning and late afternoon)    cefadroxil (Duricef) 500 mg capsule TAKE 1 CAPSULE TWICE DAILY THE FIRST DAY THEN 1 CAPSULE DAILY    cefdinir (OMNICEF) 300 mg, oral, Every 12 hours    " chlorhexidine (Peridex) 0.12 % solution RINSE AND SPIT TWICE A DAY 1/2 OZ FOR 30 SECONDS, START DAY AFTER SX    clobetasol (Temovate) 0.05 % cream Apply to affected vaginal area 2 times a week    clotrimazole (Lotrimin) 1 % cream APPLY TOPICALLY TO AFFECTED AREA 2 TIMES A DAY FOR 7 DAYS    dextrose 15 gram/32 mL gel in packet oral    diclofenac (Voltaren) 0.1 % ophthalmic solution 1 DROP IN AFFECTED EYES 4 TIMES A DAY AS NEEDED    estradiol (YUVAFEM) 10 mcg, vaginal, 3 times weekly, At night    fluconazole (Diflucan) 100 mg tablet TAKE 1 TABLET (100 MG) BY ORAL ROUTE ONCE DAILY. DO NOT TAKE ZOFRAN FOR 14 DAYS    fluocinolone (DermOtic) 0.01 % ear drops INSTIL 4 DROPS TO AFFECTED EAR(S) TWICE A DAY FOR 7 DAYS AND AS NEEDED    FreeStyle Ron 2 [facility] USE AS DIRECTED    FreeStyle Ron 2 Sensor kit     furosemide (LASIX) 20 mg, oral, Daily    furosemide (LASIX) 40 mg, oral, Daily    gabapentin (NEURONTIN) 100 mg, oral, 2 times daily    Gemtesa 75 mg tablet 1 tablet, oral, Daily    hydrALAZINE (Apresoline) 25 mg tablet oral    hydrALAZINE (Apresoline) 50 mg tablet 2 tablets, oral, 3 times daily    hydrALAZINE (APRESOLINE) 30 mg, oral, 2 times daily, With food    insulin glargine (LANTUS) 10 Units, subcutaneous, Nightly    insulin lispro (HumaLOG KwikPen Insulin) 100 unit/mL injection subcutaneous, INJECT ACCORDING TO SLIDING SCALE ( 4 18 UNITS BEFORE MEALS)    isosorbide mononitrate ER (IMDUR) 30 mg, oral, Daily    ketoconazole (NIZOral) 2 % cream Topical, 2 times daily    lamoTRIgine (LAMICTAL) 100 mg, oral, 2 times daily    levETIRAcetam (Keppra) 500 mg tablet Oral for 90    levothyroxine (SYNTHROID) 75 mcg, oral, Daily before breakfast    losartan (Cozaar) 100 mg tablet oral    metoprolol tartrate (LOPRESSOR) 25 mg, oral, 2 times daily    niacin 50 mg, oral, Daily    nitroglycerin (Nitrodur) 0.4 mg/hr patch transdermal    nystatin (Mycostatin) cream 1 Application, Topical, 2 times daily     oxyCODONE-acetaminophen (Percocet) 5-325 mg tablet 1 tablet, oral, Every 6 hours PRN    pantoprazole (PROTONIX) 40 mg, oral, Daily    polyethylene glycol-electrolytes (polyethylene glycol) 420 gram solution As directed for outpatient colonoscopy    prednisoLONE acetate (Pred-Forte) 1 % ophthalmic suspension INSTILL 1 DROP INTO RIGHT EYE 4 TIMES A DAY    Purelax 17 gram/dose powder DISSOLVE 17 GM(S) IN 8 OUNCES OF WATER AND DRINK DAILY    Repatha SureClick 140 mg, subcutaneous, Every 14 days    sennosides-docusate sodium (Senokot-S) 8.6-50 mg tablet 1 tablet, oral, Daily RT    sodium bicarbonate 650 mg tablet TAKE 2 TABLETS BY MOUTH EVERY DAY FOR 90 DAYS    Stool Softener 100 mg, oral, 2 times daily    torsemide (Demadex) 100 mg tablet 0.5 tablets, oral, Daily    warfarin (COUMADIN) 2.5 mg, oral, every Monday, Wednesday, Friday    warfarin (COUMADIN) 5 mg, oral, every Tuesday, Thursday, Saturday, Sunday       Inpatient Medications:  Scheduled medications   Medication Dose Route Frequency     PRN medications   Medication     Continuous Medications   Medication Dose Last Rate    sodium chloride 0.9%  100 mL/hr           Review of Systems   Constitutional: Negative.    HENT: Negative.     Eyes: Negative.    Respiratory: Negative.     Cardiovascular:  Positive for leg swelling.   Gastrointestinal: Negative.    Endocrine: Negative.    Genitourinary: Negative.         Fistula left arm, dialysis per chest port tues/thurs/sat. Reports dialysis yesterday shortened. removed only 1.5L d/t hypotension.     Musculoskeletal: Negative.    Skin: Negative.    Allergic/Immunologic: Negative.    Neurological: Negative.    Hematological: Negative.    Psychiatric/Behavioral: Negative.            Physical Exam  Constitutional:       General: She is awake. She is not in acute distress.     Appearance: She is not ill-appearing.   HENT:      Head: Normocephalic and atraumatic.      Mouth/Throat:      Pharynx: Oropharynx is clear.  "  Cardiovascular:      Rate and Rhythm: Normal rate and regular rhythm.      Pulses:           Radial pulses are 2+ on the right side and 2+ on the left side.        Dorsalis pedis pulses are 2+ on the right side and 2+ on the left side.      Heart sounds: Murmur heard.   Pulmonary:      Effort: Pulmonary effort is normal.      Breath sounds: Normal breath sounds.   Abdominal:      General: Bowel sounds are normal.      Palpations: Abdomen is soft.   Genitourinary:     Comments: AV fistula +thrill , +bruit, ecchymotic around site. Left arm edema  Musculoskeletal:      Right lower leg: Edema present.      Left lower leg: Edema present.      Comments: Trace lower ext edema   Skin:     General: Skin is warm and dry.      Capillary Refill: Capillary refill takes less than 2 seconds.   Neurological:      General: No focal deficit present.      Mental Status: She is alert and oriented to person, place, and time. Mental status is at baseline.      Cranial Nerves: Cranial nerves 2-12 are intact.   Psychiatric:         Mood and Affect: Mood and affect normal.         Behavior: Behavior normal.        Sedation Plan    ASA 3     Mallampati class: III.           NPO since 1800 on 9/19/2024.    Last Recorded Vitals  Blood pressure 154/80, pulse 70, temperature 36.6 °C (97.9 °F), temperature source Tympanic, resp. rate 16, height 1.651 m (5' 5\"), weight 68 kg (149 lb 14.6 oz), SpO2 99%.         Vitals from the Past 24 Hours  Heart Rate:  [70]   Temp:  [36.6 °C (97.9 °F)]   Resp:  [16]   BP: (154)/(80)   Height:  [165.1 cm (5' 5\")]   Weight:  [68 kg (149 lb 14.6 oz)]   SpO2:  [99 %]          Relevant Results    Labs    CBC:   Recent Labs     06/28/24  1223 09/21/23  1404 09/19/23  1838 08/23/23  0925 06/29/23  0711 06/27/23  0458   WBC 3.5* 4.1* 3.9* 4.2* 4.3* 4.5   HGB 15.8 11.5* 13.3 11.8* 8.9* 8.8*   HCT 47.9* 34.9* 39.9 36.8 26.3* 25.9*   * 128* 144* 136* 154 216   MCV 99 99.1 95.0 100.5* 96.0 91.5     BMP/CMP:   Recent " "Labs     06/28/24  1223 09/21/23  1404 09/20/23  0521 09/19/23  1838 08/23/23  0925 06/30/23  0429 06/24/23  0450 06/24/23  0132 05/22/23  0426 05/21/23  0418 05/20/23  1226    135 134 136 134 136   < > 118*   < > 130* 129*   K 4.8 3.3* 4.1 3.8 3.4 3.3*   < > 3.6   < > 5.5* 5.0   CL 95* 105 100 99 98 99   < > 81*   < > 99 99   BUN 43* 33* 20 13 9 8   < > 46*   < > 63* 54*   CREATININE 3.20* 2.8* 2.2* 1.6 2.2* 1.5   < > 3.8*   < > 3.5* 3.3*   CO2 24 20* 23* 27 26 24   < > 26   < > 21* 19*   CALCIUM 9.2 7.0* 8.7 8.8 8.5 7.9*   < > 8.0*   < > 8.7 8.6   PROT  --  4.5* 5.6* 6.1  --   --   --  5.3*  --  5.4* 5.7*   BILITOT  --  0.4 1.0 1.0  --   --   --  0.4  --  0.2 0.2   ALKPHOS  --  190* 266* 221*  --   --   --  94  --  83 84   ALT  --  129* 363* 44*  --   --   --  10  --  7 8   AST  --  95* 777* 148*  --   --   --  22  --  14 13   GLUCOSE 206* 137* 227* 266* 166* 108*   < > 182*   < > 150* 160*    < > = values in this interval not displayed.      Lipid Panel:   Recent Labs     12/08/22  0917 11/19/22  0241 10/17/22  0600 08/29/22  1133 07/22/22  0451 04/12/22  1028 11/19/21  0651 03/09/21  1201 03/10/20  1233 09/07/18  0506 07/05/18  1054   CHOL 165 95* 126* 180 131* 215* 98* 126* 105* 97* 120*   HDL 54 47* 38* 44* 33* 48* 55 46* 40* 49* 69   CHHDL 3.1 2.0 3.3 4.1 4.0 4.5 1.8 2.7 2.6 2.0 1.7   TRIG 168* 59 192* 225* 220* 190* 74 160* 179* 117 102     Cardiac       No lab exists for component: \"CK\", \"CKMBP\"   Hemoglobin A1C:   Recent Labs     06/28/24  1223 09/20/23  0521 05/20/23  1226 08/29/22  1133 04/12/22  1028 10/11/21  1249 09/23/21  1146 04/08/21  1523 03/09/21  1201 02/22/21  0655 03/10/20  1233 02/07/19  1157   HGBA1C 7.0* 5.5 6.9* 8.1* 8.6* 7.9* 7.8* 7.4* 6.9* 7.4* 7.2* 7.5*     TSH/ Free T4:   Recent Labs     12/08/22  0917 12/07/22  2101 11/18/22  0727 10/30/22  0414 10/17/22  0600 08/29/22  1133 07/22/22  0451 04/12/22  1028 11/18/21  0029 03/09/21  1201 02/22/21  0655 03/10/20  1233 02/12/20  1040 " "09/07/18  0506 02/02/18  1001   TSH 8.99* 9.66* 6.62* 8.13* 2.90 0.72 0.26* 7.86* 4.33* 6.59* 7.07* 4.78* 5.06*   < > 6.65*   FREET4 1.5 1.5 1.4 1.1  --   --  1.3 1.5 1.5 1.6 1.5 1.5 1.7  --  1.4    < > = values in this interval not displayed.     Iron:   Recent Labs     05/11/23  1305 05/05/23  1348 12/16/22  1329 11/18/22  0727 10/17/22  0600 11/19/21  1736 04/19/21  1501 01/18/21  1525 11/23/20  1334 07/09/20  1417 04/08/20  1213 02/12/20  1040   FERRITIN 350* 318* 326* 629* 360* 165* 209* 263* 266* 250* 332* 412*   TIBC 189* 217* 185* 149* 155* 216* 211* 196* 229 212* 237 234   IRONSAT 18.0 29.5 20.0 24.2 12.9 23.1 26.5 27.6 31.4 27.4 24.1 34.2     Coag:     ABO: No results found for: \"ABO\"    Past Cardiology Tests (Last 3 Years):    EKG:  No results for input(s): \"ATRRATE\", \"VENTRATE\", \"PRINT\", \"QRSDUR\", \"QTCFRED\", \"QTCCALCB\" in the last 38148 hours.No results found for this or any previous visit (from the past 4464 hour(s)).  Echo:  Echocardiogram:   ECHOCARDIOGRAM     Narrative  Ordered by an unspecified provider.    Ejection Fractions:  No results found for: \"EF\"  Cath:  Coronary Angiography:   ADULT CATH     Narrative  Ordered by an unspecified provider.    Right Heart Cath: No results found for this or any previous visit from the past 1800 days.    Stress Test:  Nuclear:No results found for this or any previous visit from the past 1800 days.    Metabolic Stress: No results found for this or any previous visit from the past 1800 days.    Cardiac Imaging:  Cardiac Scoring: No results found for this or any previous visit from the past 1800 days.    Cardiac MRI: No results found for this or any previous visit from the past 1800 days.         Assessment/Plan  Assessment/Plan   Principal Problem:    Stage 5 chronic kidney disease on chronic dialysis (Multi)  Active Problems:    Dialysis AV fistula malfunction (CMS-[facility])    Here for peripheral fistulgram with declot on 9/20/2024 with Dr. Harrison.            NP " discussed with Dr. Harrison regarding plan of care/ discharge plan      I spent 35 minutes in the professional and overall care of this patient.      Ailyn Silva, APRN-CNP

## 2024-09-20 ENCOUNTER — OFFICE VISIT (OUTPATIENT)
Dept: CARDIOLOGY | Facility: CLINIC | Age: 74
End: 2024-09-20
Payer: MEDICARE

## 2024-09-20 ENCOUNTER — HOSPITAL ENCOUNTER (OUTPATIENT)
Facility: HOSPITAL | Age: 74
Setting detail: OUTPATIENT SURGERY
Discharge: HOME | End: 2024-09-20
Attending: SURGERY | Admitting: SURGERY
Payer: MEDICARE

## 2024-09-20 VITALS
HEIGHT: 65 IN | TEMPERATURE: 97.9 F | HEART RATE: 65 BPM | DIASTOLIC BLOOD PRESSURE: 94 MMHG | RESPIRATION RATE: 15 BRPM | SYSTOLIC BLOOD PRESSURE: 185 MMHG | BODY MASS INDEX: 24.98 KG/M2 | OXYGEN SATURATION: 100 % | WEIGHT: 149.91 LBS

## 2024-09-20 VITALS
HEART RATE: 65 BPM | RESPIRATION RATE: 16 BRPM | SYSTOLIC BLOOD PRESSURE: 113 MMHG | BODY MASS INDEX: 24.83 KG/M2 | HEIGHT: 65 IN | DIASTOLIC BLOOD PRESSURE: 73 MMHG | OXYGEN SATURATION: 98 % | TEMPERATURE: 98 F | WEIGHT: 149 LBS

## 2024-09-20 DIAGNOSIS — Z99.2 STAGE 5 CHRONIC KIDNEY DISEASE ON CHRONIC DIALYSIS (MULTI): ICD-10-CM

## 2024-09-20 DIAGNOSIS — T82.590A MALFUNCTION OF ARTERIOVENOUS DIALYSIS FISTULA, INITIAL ENCOUNTER (CMS-HCC): Primary | ICD-10-CM

## 2024-09-20 DIAGNOSIS — Z95.5 HISTORY OF CORONARY ARTERY STENT PLACEMENT: ICD-10-CM

## 2024-09-20 DIAGNOSIS — I50.30 DIASTOLIC HEART FAILURE, UNSPECIFIED HF CHRONICITY: ICD-10-CM

## 2024-09-20 DIAGNOSIS — R07.89 CHEST DISCOMFORT: ICD-10-CM

## 2024-09-20 DIAGNOSIS — I16.0 HYPERTENSIVE URGENCY: Primary | ICD-10-CM

## 2024-09-20 DIAGNOSIS — N18.6 STAGE 5 CHRONIC KIDNEY DISEASE ON CHRONIC DIALYSIS (MULTI): ICD-10-CM

## 2024-09-20 DIAGNOSIS — R94.31 ELECTROCARDIOGRAM ABNORMAL: ICD-10-CM

## 2024-09-20 DIAGNOSIS — E78.2 MIXED HYPERLIPIDEMIA: ICD-10-CM

## 2024-09-20 LAB
ANION GAP SERPL CALCULATED.3IONS-SCNC: 12 MMOL/L (ref 10–20)
BUN SERPL-MCNC: 45 MG/DL (ref 6–23)
CALCIUM SERPL-MCNC: 8.7 MG/DL (ref 8.6–10.3)
CHLORIDE SERPL-SCNC: 95 MMOL/L (ref 98–107)
CO2 SERPL-SCNC: 30 MMOL/L (ref 21–32)
CREAT SERPL-MCNC: 3.61 MG/DL (ref 0.5–1.05)
EGFRCR SERPLBLD CKD-EPI 2021: 13 ML/MIN/1.73M*2
ERYTHROCYTE [DISTWIDTH] IN BLOOD BY AUTOMATED COUNT: 15.6 % (ref 11.5–14.5)
EST. AVERAGE GLUCOSE BLD GHB EST-MCNC: 212 MG/DL
GLUCOSE SERPL-MCNC: 173 MG/DL (ref 74–99)
HBA1C MFR BLD: 9 %
HCT VFR BLD AUTO: 42.4 % (ref 36–46)
HGB BLD-MCNC: 14.2 G/DL (ref 12–16)
MCH RBC QN AUTO: 32.7 PG (ref 26–34)
MCHC RBC AUTO-ENTMCNC: 33.5 G/DL (ref 32–36)
MCV RBC AUTO: 98 FL (ref 80–100)
NRBC BLD-RTO: 0 /100 WBCS (ref 0–0)
PLATELET # BLD AUTO: 154 X10*3/UL (ref 150–450)
POCT INTERNATIONAL NORMALIZATION RATIO: NORMAL
POCT PROTHROMBIN TIME: 1.9 SECONDS
POTASSIUM SERPL-SCNC: 6 MMOL/L (ref 3.5–5.3)
RBC # BLD AUTO: 4.34 X10*6/UL (ref 4–5.2)
SODIUM SERPL-SCNC: 131 MMOL/L (ref 136–145)
WBC # BLD AUTO: 4.8 X10*3/UL (ref 4.4–11.3)

## 2024-09-20 PROCEDURE — C1769 GUIDE WIRE: HCPCS | Performed by: SURGERY

## 2024-09-20 PROCEDURE — 1157F ADVNC CARE PLAN IN RCRD: CPT | Performed by: INTERNAL MEDICINE

## 2024-09-20 PROCEDURE — 2500000004 HC RX 250 GENERAL PHARMACY W/ HCPCS (ALT 636 FOR OP/ED): Performed by: SURGERY

## 2024-09-20 PROCEDURE — 36901 INTRO CATH DIALYSIS CIRCUIT: CPT | Performed by: SURGERY

## 2024-09-20 PROCEDURE — 76937 US GUIDE VASCULAR ACCESS: CPT | Performed by: SURGERY

## 2024-09-20 PROCEDURE — 7100000010 HC PHASE TWO TIME - EACH INCREMENTAL 1 MINUTE: Performed by: SURGERY

## 2024-09-20 PROCEDURE — 3052F HG A1C>EQUAL 8.0%<EQUAL 9.0%: CPT | Performed by: INTERNAL MEDICINE

## 2024-09-20 PROCEDURE — 99153 MOD SED SAME PHYS/QHP EA: CPT | Performed by: SURGERY

## 2024-09-20 PROCEDURE — 99214 OFFICE O/P EST MOD 30 MIN: CPT | Performed by: INTERNAL MEDICINE

## 2024-09-20 PROCEDURE — 1125F AMNT PAIN NOTED PAIN PRSNT: CPT | Performed by: INTERNAL MEDICINE

## 2024-09-20 PROCEDURE — 85027 COMPLETE CBC AUTOMATED: CPT | Performed by: NURSE PRACTITIONER

## 2024-09-20 PROCEDURE — 83036 HEMOGLOBIN GLYCOSYLATED A1C: CPT | Mod: WESLAB | Performed by: SURGERY

## 2024-09-20 PROCEDURE — 3008F BODY MASS INDEX DOCD: CPT | Performed by: INTERNAL MEDICINE

## 2024-09-20 PROCEDURE — 80048 BASIC METABOLIC PNL TOTAL CA: CPT | Performed by: NURSE PRACTITIONER

## 2024-09-20 PROCEDURE — 3074F SYST BP LT 130 MM HG: CPT | Performed by: INTERNAL MEDICINE

## 2024-09-20 PROCEDURE — 2500000005 HC RX 250 GENERAL PHARMACY W/O HCPCS: Performed by: SURGERY

## 2024-09-20 PROCEDURE — 3078F DIAST BP <80 MM HG: CPT | Performed by: INTERNAL MEDICINE

## 2024-09-20 PROCEDURE — 1159F MED LIST DOCD IN RCRD: CPT | Performed by: INTERNAL MEDICINE

## 2024-09-20 PROCEDURE — 99152 MOD SED SAME PHYS/QHP 5/>YRS: CPT | Performed by: SURGERY

## 2024-09-20 PROCEDURE — 2550000001 HC RX 255 CONTRASTS: Performed by: SURGERY

## 2024-09-20 PROCEDURE — 7100000009 HC PHASE TWO TIME - INITIAL BASE CHARGE: Performed by: SURGERY

## 2024-09-20 PROCEDURE — 1160F RVW MEDS BY RX/DR IN RCRD: CPT | Performed by: INTERNAL MEDICINE

## 2024-09-20 PROCEDURE — C1887 CATHETER, GUIDING: HCPCS | Performed by: SURGERY

## 2024-09-20 PROCEDURE — 2720000007 HC OR 272 NO HCPCS: Performed by: SURGERY

## 2024-09-20 PROCEDURE — C1894 INTRO/SHEATH, NON-LASER: HCPCS | Performed by: SURGERY

## 2024-09-20 PROCEDURE — 36415 COLL VENOUS BLD VENIPUNCTURE: CPT | Performed by: NURSE PRACTITIONER

## 2024-09-20 RX ORDER — SODIUM CHLORIDE 9 MG/ML
100 INJECTION, SOLUTION INTRAVENOUS CONTINUOUS
Status: DISCONTINUED | OUTPATIENT
Start: 2024-09-20 | End: 2024-09-20 | Stop reason: HOSPADM

## 2024-09-20 RX ORDER — LIDOCAINE HYDROCHLORIDE 10 MG/ML
INJECTION, SOLUTION EPIDURAL; INFILTRATION; INTRACAUDAL; PERINEURAL AS NEEDED
Status: DISCONTINUED | OUTPATIENT
Start: 2024-09-20 | End: 2024-09-20 | Stop reason: HOSPADM

## 2024-09-20 RX ORDER — IODIXANOL 320 MG/ML
INJECTION, SOLUTION INTRAVASCULAR AS NEEDED
Status: DISCONTINUED | OUTPATIENT
Start: 2024-09-20 | End: 2024-09-20 | Stop reason: HOSPADM

## 2024-09-20 RX ORDER — MIDAZOLAM HYDROCHLORIDE 1 MG/ML
INJECTION, SOLUTION INTRAMUSCULAR; INTRAVENOUS AS NEEDED
Status: DISCONTINUED | OUTPATIENT
Start: 2024-09-20 | End: 2024-09-20 | Stop reason: HOSPADM

## 2024-09-20 ASSESSMENT — ENCOUNTER SYMPTOMS
NEUROLOGICAL NEGATIVE: 1
GASTROINTESTINAL NEGATIVE: 1
ENDOCRINE NEGATIVE: 1
CONSTITUTIONAL NEGATIVE: 1
RESPIRATORY NEGATIVE: 1
MUSCULOSKELETAL NEGATIVE: 1
EYES NEGATIVE: 1
PSYCHIATRIC NEGATIVE: 1
OCCASIONAL FEELINGS OF UNSTEADINESS: 0
HEMATOLOGIC/LYMPHATIC NEGATIVE: 1
DEPRESSION: 0
LOSS OF SENSATION IN FEET: 0
ALLERGIC/IMMUNOLOGIC NEGATIVE: 1

## 2024-09-20 ASSESSMENT — LIFESTYLE VARIABLES
HAVE YOU OR SOMEONE ELSE BEEN INJURED AS A RESULT OF YOUR DRINKING: NO
HAS A RELATIVE, FRIEND, DOCTOR, OR ANOTHER HEALTH PROFESSIONAL EXPRESSED CONCERN ABOUT YOUR DRINKING OR SUGGESTED YOU CUT DOWN: NO
AUDIT-C TOTAL SCORE: 0
HOW OFTEN DO YOU HAVE A DRINK CONTAINING ALCOHOL: NEVER
HOW OFTEN DO YOU HAVE SIX OR MORE DRINKS ON ONE OCCASION: NEVER
SKIP TO QUESTIONS 9-10: 1
HOW MANY STANDARD DRINKS CONTAINING ALCOHOL DO YOU HAVE ON A TYPICAL DAY: PATIENT DOES NOT DRINK
AUDIT TOTAL SCORE: 0

## 2024-09-20 ASSESSMENT — COLUMBIA-SUICIDE SEVERITY RATING SCALE - C-SSRS
6. HAVE YOU EVER DONE ANYTHING, STARTED TO DO ANYTHING, OR PREPARED TO DO ANYTHING TO END YOUR LIFE?: NO
1. IN THE PAST MONTH, HAVE YOU WISHED YOU WERE DEAD OR WISHED YOU COULD GO TO SLEEP AND NOT WAKE UP?: NO
2. HAVE YOU ACTUALLY HAD ANY THOUGHTS OF KILLING YOURSELF?: NO

## 2024-09-20 ASSESSMENT — PAIN SCALES - GENERAL
PAINLEVEL_OUTOF10: 0 - NO PAIN
PAINLEVEL: 8

## 2024-09-20 ASSESSMENT — PATIENT HEALTH QUESTIONNAIRE - PHQ9
SUM OF ALL RESPONSES TO PHQ9 QUESTIONS 1 AND 2: 0
1. LITTLE INTEREST OR PLEASURE IN DOING THINGS: NOT AT ALL
2. FEELING DOWN, DEPRESSED OR HOPELESS: NOT AT ALL

## 2024-09-20 ASSESSMENT — PAIN - FUNCTIONAL ASSESSMENT: PAIN_FUNCTIONAL_ASSESSMENT: 0-10

## 2024-09-20 NOTE — Clinical Note
Vessel(s): left Brachial artery-Cephalic vein AV fistula. Injected with hand injections. Single view taken.

## 2024-09-20 NOTE — INTERVAL H&P NOTE
"H&P reviewed. The patient was examined and there are no changes to the H&P.    /80   Pulse 70   Temp 36.6 °C (97.9 °F) (Tympanic)   Resp 16   Ht 1.651 m (5' 5\")   Wt 68 kg (149 lb 14.6 oz)   SpO2 99%   BMI 24.95 kg/m²     General Appearance:    Alert, cooperative, no distress, appears stated age   Head:    Normocephalic, without obvious abnormality, atraumatic   Eyes:    PERRL, conjunctiva/corneas clear, EOM's intact, fundi     benign, both eyes   Ears:    Normal TM's and external ear canals, both ears   Nose:   Nares normal, septum midline, mucosa normal, no drainage     or sinus tenderness   Throat:   Lips, mucosa, and tongue normal; teeth and gums normal   Neck:   Supple, symmetrical, trachea midline, no adenopathy;     thyroid:  no enlargement/tenderness/nodules; no carotid    bruit or JVD   Back:     Symmetric, no curvature, ROM normal, no CVA tenderness   Lungs:     Clear to auscultation bilaterally, respirations unlabored   Chest Wall:    No tenderness or deformity    Heart:    Regular rate and rhythm, S1 and S2 normal, no murmur, rub    or gallop       Abdomen:     Soft, non-tender, bowel sounds active all four quadrants,     no masses, no organomegaly           Extremities:   Extremities normal, atraumatic, no cyanosis or edema. Patent left arm AVF, difficult to access   Pulses:   2+ and symmetric all extremities   Skin:   Skin color, texture, turgor normal, no rashes or lesions   Lymph nodes:   Cervical, supraclavicular, and axillary nodes normal   Neurologic:   CNII-XII intact, normal strength, sensation and reflexes     throughout       "

## 2024-09-20 NOTE — Clinical Note
Vessel(s): left Brachial Art-Cephalic vein AV fistula. Injected with hand injections. Single view taken.

## 2024-09-20 NOTE — Clinical Note
Sheath inserted in the left Brachial Artery-Cephalic Vein AV fistula in the retrograde direction under fluroscopic guidance.

## 2024-09-20 NOTE — PROGRESS NOTES
History of present illness:  This is a very pleasant 71-year-old female with history of coronary disease status post multiple percutaneous coronary interventions in the past.  Patient started on dialysis recently.  Has issues with her fistula and currently has dialysis catheter.  Apparently patient has been having issues with hypotension lately during dialysis.  She is only on carvedilol 25 mg oral twice daily for blood pressure and additional warfarin and Repatha.  Patient denies having any chest pain shortness of breath palpitations dizziness or lightheadedness.  Has been feeling overall okay except his episodes of hypotension during dialysis.  Denies any nausea vomiting or diaphoresis.     Past Medical History:   Diagnosis Date    Atherosclerotic heart disease of native coronary artery without angina pectoris     Coronary heart disease    Chronic kidney disease, stage 3 unspecified (Multi)     Chronic kidney disease, stage III (moderate)    Hyperuricemia without signs of inflammatory arthritis and tophaceous disease     Asymptomatic hyperuricemia    Personal history of diseases of the blood and blood-forming organs and certain disorders involving the immune mechanism     History of anemia    Personal history of Hodgkin lymphoma     History of Hodgkin's lymphoma    Personal history of other diseases of the circulatory system     History of essential hypertension    Personal history of other diseases of the digestive system     History of gastroesophageal reflux (GERD)    Personal history of other diseases of the musculoskeletal system and connective tissue     Personal history of arthritis    Personal history of other endocrine, nutritional and metabolic disease     History of hypothyroidism    Personal history of other endocrine, nutritional and metabolic disease 05/03/2021    History of type 2 diabetes mellitus    Personal history of other endocrine, nutritional and metabolic disease     History of  "hypercholesterolemia    Personal history of other endocrine, nutritional and metabolic disease     History of thyroid disease    Personal history of other specified conditions     History of shortness of breath    Type 2 diabetes mellitus with other diabetic kidney complication (Multi)     Type 2 diabetes mellitus with renal manifestations       Past Surgical History:   Procedure Laterality Date    CHOLECYSTECTOMY  03/11/2014    Cholecystectomy Laparoscopic    MR HEAD ANGIO WO IV CONTRAST  8/16/2018    MR HEAD ANGIO WO IV CONTRAST [facility]    MR NECK ANGIO WO IV CONTRAST  8/16/2018    MR NECK ANGIO WO IV CONTRAST [facility]    OTHER SURGICAL HISTORY  09/16/2021    Hysterectomy    SINUS SURGERY  08/21/2014    Sinus Surgery    TONSILLECTOMY  08/21/2014    Tonsillectomy       Allergies   Allergen Reactions    Levofloxacin Itching and Unknown    Atorvastatin Other and Unknown    Fentanyl Nausea/vomiting    Heparin (Porcine) Nausea/vomiting    Sulfamethoxazole-Trimethoprim Other and Unknown    Adhesive Tape-Silicones Unknown    Amoxicillin Unknown    Omeprazole Unknown    Thiopental Unknown    Sulfa (Sulfonamide Antibiotics) Itching and Unknown        reports that she has never smoked. She has never been exposed to tobacco smoke. She has never used smokeless tobacco. She reports that she does not currently use alcohol. She reports that she does not use drugs.    No family history on file.    Patient's Medications   New Prescriptions    No medications on file   Previous Medications    ACETAMINOPHEN (TYLENOL) 325 MG TABLET    Take 2 tablets (650 mg) by mouth every 6 hours if needed.    AMLODIPINE (NORVASC) 10 MG TABLET    Take by mouth.    ATORVASTATIN (LIPITOR) 40 MG TABLET    Take 1 tablet (40 mg) by mouth once daily.    AZITHROMYCIN (ZITHROMAX) 500 MG TABLET    Take 1 tablet (500 mg) by mouth once daily.    BD AUTOSHIELD DUO PEN NEEDLE 30 GAUGE X 3/16\" NEEDLE    MAY USE TO INJECT UP TO FOUR TIMES " DAILY    BUTALBITAL-ACETAMINOPHEN-CAFF -40 MG TABLET    Take by mouth.    CALCIUM CARBONATE (TUMS) 200 MG CALCIUM CHEWABLE TABLET    Chew every 12 hours if needed.    CARVEDILOL (COREG) 12.5 MG TABLET    Take 1 tablet (12.5 mg) by mouth 2 times a day.    CARVEDILOL (COREG) 25 MG TABLET    Take 1 tablet (25 mg) by mouth 2 times daily (morning and late afternoon).    CEFADROXIL (DURICEF) 500 MG CAPSULE    TAKE 1 CAPSULE TWICE DAILY THE FIRST DAY THEN 1 CAPSULE DAILY    CEFDINIR (OMNICEF) 300 MG CAPSULE    Take 1 capsule (300 mg) by mouth every 12 hours.    CHLORHEXIDINE (PERIDEX) 0.12 % SOLUTION    RINSE AND SPIT TWICE A DAY 1/2 OZ FOR 30 SECONDS, START DAY AFTER SX    CLOBETASOL (TEMOVATE) 0.05 % CREAM    Apply to affected vaginal area 2 times a week    CLOTRIMAZOLE (LOTRIMIN) 1 % CREAM    APPLY TOPICALLY TO AFFECTED AREA 2 TIMES A DAY FOR 7 DAYS    DEXTROSE 15 GRAM/32 ML GEL IN PACKET    Take by mouth.    DICLOFENAC (VOLTAREN) 0.1 % OPHTHALMIC SOLUTION    1 DROP IN AFFECTED EYES 4 TIMES A DAY AS NEEDED    ESTRADIOL (YUVAFEM) 10 MCG TABLET VAGINAL TABLET    Insert 1 tablet (10 mcg) into the vagina 3 times a week. At night    EVOLOCUMAB (REPATHA SURECLICK) 140 MG/ML INJECTION    Inject 1 mL (140 mg) under the skin every 14 (fourteen) days.    FLUCONAZOLE (DIFLUCAN) 100 MG TABLET    TAKE 1 TABLET (100 MG) BY ORAL ROUTE ONCE DAILY. DO NOT TAKE ZOFRAN FOR 14 DAYS    FLUOCINOLONE (DERMOTIC) 0.01 % EAR DROPS    INSTIL 4 DROPS TO AFFECTED EAR(S) TWICE A DAY FOR 7 DAYS AND AS NEEDED    FREESTYLE YOLA 2 READER [facility] [place]    USE AS DIRECTED    FREESTYLE YOLA 2 SENSOR KIT        FUROSEMIDE (LASIX) 20 MG TABLET    Take 1 tablet (20 mg) by mouth once daily.    FUROSEMIDE (LASIX) 40 MG TABLET    Take 1 tablet (40 mg) by mouth once daily.    GABAPENTIN (NEURONTIN) 100 MG CAPSULE    Take 1 capsule (100 mg) by mouth 2 times a day.    GEMTESA 75 MG TABLET    Take 1 tablet (75 mg) by mouth once daily.    HYDRALAZINE (APRESOLINE) 10  MG TABLET    Take 3 tablets (30 mg) by mouth 2 times a day. With food    HYDRALAZINE (APRESOLINE) 25 MG TABLET    Take by mouth.    HYDRALAZINE (APRESOLINE) 50 MG TABLET    Take 2 tablets (100 mg) by mouth 3 times a day.    INSULIN GLARGINE (LANTUS) 100 UNIT/ML (3 ML) PEN    Inject 10 Units under the skin once daily at bedtime.    INSULIN LISPRO (HUMALOG KWIKPEN INSULIN) 100 UNIT/ML INJECTION    Inject under the skin. INJECT ACCORDING TO SLIDING SCALE ( 4 18 UNITS BEFORE MEALS)    ISOSORBIDE MONONITRATE ER (IMDUR) 30 MG 24 HR TABLET    Take 1 tablet (30 mg) by mouth once daily.    KETOCONAZOLE (NIZORAL) 2 % CREAM    Apply topically 2 times a day.    LAMOTRIGINE (LAMICTAL) 100 MG TABLET    Take 1 tablet (100 mg) by mouth 2 times a day.    LEVETIRACETAM (KEPPRA) 500 MG TABLET    Oral for 90    LEVOTHYROXINE (SYNTHROID) 75 MCG TABLET    Take 1 tablet (75 mcg) by mouth once daily in the morning. Take before meals.    LOSARTAN (COZAAR) 100 MG TABLET    Take by mouth.    METOPROLOL TARTRATE (LOPRESSOR) 25 MG TABLET    Take 1 tablet (25 mg) by mouth 2 times a day.    NIACIN 50 MG TABLET    Take 1 tablet (50 mg) by mouth once daily.    NITROGLYCERIN (NITRODUR) 0.4 MG/HR PATCH    Place on the skin.    NYSTATIN (MYCOSTATIN) CREAM    Apply 1 Application topically 2 times a day.    OXYCODONE-ACETAMINOPHEN (PERCOCET) 5-325 MG TABLET    Take 1 tablet by mouth every 6 hours if needed for moderate pain (4 - 6) or severe pain (7 - 10).    PANTOPRAZOLE (PROTONIX) 40 MG EC TABLET    Take 1 tablet (40 mg) by mouth once daily.    POLYETHYLENE GLYCOL-ELECTROLYTES (POLYETHYLENE GLYCOL) 420 GRAM SOLUTION    As directed for outpatient colonoscopy    PREDNISOLONE ACETATE (PRED-FORTE) 1 % OPHTHALMIC SUSPENSION    INSTILL 1 DROP INTO RIGHT EYE 4 TIMES A DAY    PURELAX 17 GRAM/DOSE POWDER    DISSOLVE 17 GM(S) IN 8 OUNCES OF WATER AND DRINK DAILY    SENNOSIDES-DOCUSATE SODIUM (SENOKOT-S) 8.6-50 MG TABLET    Take 1 tablet by mouth once daily.     SODIUM BICARBONATE 650 MG TABLET    TAKE 2 TABLETS BY MOUTH EVERY DAY FOR 90 DAYS    STOOL SOFTENER 100 MG CAPSULE    Take 1 capsule (100 mg) by mouth 2 times a day.    TORSEMIDE (DEMADEX) 100 MG TABLET    Take 0.5 tablets (50 mg) by mouth once daily.    WARFARIN (COUMADIN) 2.5 MG TABLET    Take 1 tablet (2.5 mg) by mouth. every Monday, Wednesday, Friday    WARFARIN (COUMADIN) 5 MG TABLET    Take 1 tablet (5 mg) by mouth. every Tuesday, Thursday, Saturday, Sunday   Modified Medications    No medications on file   Discontinued Medications    No medications on file       Objective   Physical Exam  General: Patient in no acute distress   HEENT: Atraumatic normocephalic.  Neck: Supple, jugular venous pressure within normal limit.  No bruits  Lungs: Clear to auscultation bilaterally  Cardiovascular: Regular rate and rhythm, normal heart sounds, no murmurs rubs or gallops  Abdomen: Soft nontender nondistended.  Normal bowel sounds.  Extremities: Warm to touch, no edema.    Lab Review   Admission on 09/20/2024, Discharged on 09/20/2024   Component Date Value    WBC 09/20/2024 4.8     nRBC 09/20/2024 0.0     RBC 09/20/2024 4.34     Hemoglobin 09/20/2024 14.2     Hematocrit 09/20/2024 42.4     MCV 09/20/2024 98     MCH 09/20/2024 32.7     MCHC 09/20/2024 33.5     RDW 09/20/2024 15.6 (H)     Platelets 09/20/2024 154     Glucose 09/20/2024 173 (H)     Sodium 09/20/2024 131 (L)     Potassium 09/20/2024 6.0 (H)     Chloride 09/20/2024 95 (L)     Bicarbonate 09/20/2024 30     Anion Gap 09/20/2024 12     Urea Nitrogen 09/20/2024 45 (H)     Creatinine 09/20/2024 3.61 (H)     eGFR 09/20/2024 13 (L)     Calcium 09/20/2024 8.7     Hemoglobin A1C 09/20/2024 9.0 (H)     Estimated Average Glucose 09/20/2024 212     POCT Prothrombin time 09/20/2024 1.9    Anticoagulation - Warfarin Visit on 08/26/2024   Component Date Value    POC INR 08/26/2024 1.30    Anticoagulation - Warfarin Visit on 07/22/2024   Component Date Value    POC INR  07/22/2024 1.30         Assessment/Plan   Patient Active Problem List   Diagnosis    Altered mental status    Anemia of chronic disease    Angina pectoris (CMS-[facility])    Ankle arthritis    Atherosclerosis of coronary artery    Bilateral sensorineural hearing loss    Cerebrovascular accident (CVA) (Multi)    Chest discomfort    Chronic atrial fibrillation (Multi)    Chronic edema    Closed trimalleolar fracture    Colitis    Contusion of knee    Chronic renal impairment associated with type 2 diabetes mellitus (Multi)    Dyspnea    Electrocardiogram abnormal    Electrolyte imbalance    Epigastric pain    Benign essential hypertension    Asthenia    Female pelvic pain    Gastroesophageal reflux disease    Heart failure (Multi)    Hematuria    History of coronary artery stent placement    History of deep vein thrombosis    History of non-Hodgkin's lymphoma    Hypertensive urgency    Hyperkalemia    Hypomagnesemia    Hyponatremia    Hypothyroid    Injury of lower extremity    Iron deficiency    Lichen sclerosus    Lower leg edema    Lower urinary tract symptoms (LUTS)    Lumbar pain    Hyperlipidemia    Fibromyalgia    Nocturia    Obesity    Pelvic floor weakness    Post-traumatic arthritis DRUJ (distal radioulnar joint), right    Right wrist pain    Complicated migraine    Seizure disorder (Multi)    Severe protein-calorie malnutrition ([place]: less than 60% of standard weight) (Multi)    Chronic kidney disease, stage 5 (Multi)    Syncope    Temporal arteritis (Multi)    Hematemesis    Vaginal atrophy    CKD (chronic kidney disease) stage V requiring chronic dialysis (Multi)    Dialysis AV fistula malfunction (CMS-[facility])    Stage 5 chronic kidney disease on chronic dialysis (Multi)      This is a very pleasant 71-year-old female with history of coronary disease status post multiple percutaneous coronary interventions in the past.  Patient started on dialysis recently.  Has issues with her fistula and currently has dialysis  catheter.  Apparently patient has been having issues with hypotension lately during dialysis.  She is only on carvedilol 25 mg oral twice daily for blood pressure and additional warfarin and Repatha.  Patient denies having any chest pain shortness of breath palpitations dizziness or lightheadedness.  Has been feeling overall okay except his episodes of hypotension during dialysis.  Denies any nausea vomiting or diaphoresis.  Her blood pressure is very well-controlled with diltiazem 25 mg oral twice daily.  My recommendation is to hold her carvedilol before dialysis in the morning the day of dialysis and to take the night dose.  If her blood pressure is elevated after dialysis without the carvedilol then to take it after dialysis.  Otherwise she stable from my standpoint we will follow-up in 4 months.    Aziza Guzman MD

## 2024-09-20 NOTE — DISCHARGE INSTRUCTIONS
What is Fistulagram? Patient and Family Education      What is a Fistulagram?  A fistulagram is an X-Ray study of your dialysis fistulas using IV x-ray dye. This procedure can  find problems such as a blood clot or narrowing of your fistula blood vessels. Early treatment  can improve your fistula’s performance and help it last longer.    Why do I need a Fistulagram?  You may be having symptoms that suggest a blockage in your fistula.  For example:  ? A blockage in your fistula may cause high pressures during your dialysis.  ? If you cannot feel a thrill with your fistula, the fistula may be blocked.  The blood vessels that are connected to the fistula are subjected to higher blood flow and  pressure. Often this results in the formation of scar tissue, causing narrowing of the fistula or  blood vessels. A fistulagram can identify exactly where the blood vessel is blocked, how severe  the blockage is, and what is causing the blockage.    Prepare for the Procedure:  Follow these listed instructions. Notify the Radiology Department immediately if:  ? If you take medications containing Plavix, Coumadin, or other blood thinners. You must  ask your doctor if/when you should stop taking them. You may need to stop taking the  medicine up to 7 days prior to the procedure.  ? Do Not Drink or Eat Anything after midnight the day before the procedure. You may  take certain medications with a small amount of clear liquid.  ? If you take daily oral diabetic medications containing Glucophage, contact your  Radiologist or Radiology Nurse to determine if you should take your medicine before the  procedure or adjust the dosage.  ? Let us know if you have an allergy to X-Ray contrast dye. You may need to take special  medication prior to the procedure.  ? Bring a list of all of your medications or medication bottles with you on the day of the  procedure.  ? You will need an adult to drive you home because of medications given during  the  procedure. You cannot go home by yourself or by a cab. The procedure cannot be started  until you have a ride home.    Before the Procedure:  ? An Interventional Radiologist (a doctor specially trained to perform this procedure) or  Radiology Practitioner will talk to you about the procedure and risks. They will answer  your questions and ask you to sign a consent form.  ? You will be asked to put on a hospital gown and remove anything metal (such as jewelry  or false teeth/dentures). If possible, leave your valuables and jewelry at home.  ? Your family or friend will be asked to wait in our waiting area.  ? An IV will be started to allow us to give you fluids and medications for the procedure.  ? You may also need blood tests done before the procedure.    During the Procedure:  ? You will be taken into our procedure room where you will be place on the x-ray table.  ? Your blood pressure, heart rate and oxygen level will be watched closely.  ? A germ killing solution will be used to wash your fistula site to reduce the chance of  infection. Lidocaine (a medication similar to what a dentist uses) will be injected to  numb the skin. It will sting and burn for a few seconds before the area becomes numb.  ? A nurse will also be present to give you medications in your IV to help you relax and  reduce your discomfort. Because you need to hold your breath while we take some x-ray  pictures you will need to be awake during the procedure.  ? The Interventional Radiologist will insert a needle into the fistula, similar to using your  graft for hemodialysis. You will feel warmth in your hand, arm, and chest, and may get a  metallic taste in your mouth when you receive the contrast. This usually lasts 10-15  seconds.  ? Several x-ray pictures are taken. The Radiologist reviews these pictures and the findings  are discussed with your doctor.  ? If there is a narrowing or clot present in the blood vessel that can be helped,  the  Radiologist may use special devices to open the blood vessel or break up the clot.  ? A fistulagram usually takes 2-3 hours to complete.    Risks of a Fistulagram Procedure:  ? A fistulagram is a relatively safe procedure; however the procedure is not totally riskfree.  ? Placing a catheter in your artery can damage it. Even when it has not be damaged, you  may have a bruise or small lump where the catheter was inserted. The bruise or lump  may be sore, but will go away in a few days.  ? A few patients may become ill from the contrast dye.    Benefits of having a Fistulagram Procedure:  ? The benefit of a fistulagram is that it can give your doctors exact information regarding  problems with your fistula and help them plan the best treatment for you.    After the Procedure:  ? After the procedure is complete, you will be taken to our recovery room.  ? Once the medication you received during the procedure wears off, the Interventional  Radiologist will remove the catheters from your fistula.  ? Pressure is held for 10-30 minutes on the insertion site to make sure that there is no  bleeding.  ? Sometimes sutures are placed in the skin around the puncture sites because of bleeding.  These are visible blue or black threads overlying the puncture site in your dialysis graft.  ? These sutures only need to remain in place long enough for the puncture site to heal.  ? At your next dialysis session, please ask your dialysis nurse to remove these sutures. If  for any reason he or she is uncomfortable in removing them, please call the Angiography  (Angio) Division and we will arrange for you to return to us and have them removed.  If is very important that you have these sutures removed 2-3 days after the procedure.    Sedation:  If you received medication for sedation, it will be active in your body for approximately 24  hours. So you may feel a little sleepy. Therefore, for the next 24 hours:  ? DO NOT drive a car,  operate machinery or power tools. It is recommended that a   responsible adult be with you for the first 24 hours.  ? DO NOT drink any alcoholic beverages or take any non-prescriptive medications that   contain alcohol.  ? DO NOT make any important decisions or sign any legal/important documents.    Activity  ? You may resume your regular activities (including driving) after 24 hours, unless you have  been restricted for other reasons.  ? No exercising, lifting heavy objects, or strenuous activity for the next 24 hours.  ? You may shower 24 hours after the procedure. Keep the puncture site clean and dry until then.    When Can I Go Home?  ? When you meet the discharge criteria, you may go home.  ? To be discharged, your vital signs must be stable and your fistula site should be clean and  dry with no sign of bleeding.  ? In addition you should be able to:  o Tell us your name, what the date is and where you are.  o Walk without getting dizzy.  o Drink liquids and food without feeling sick to your stomach.  o You will need someone else to drive you home.    What to do After I Go Home:  ? Monitor your fistula frequently.  ? A good thrill in your fistula indicates good blood flow through the vein.  ? A good pulse in your fistula indicates good blood flow through the artery.  ? You are the best person to  how well your fistula is working.  ? Have the dialysis nurse call the Radiology Department if they have difficulties using your  fistula for dialysis.    When to call your physician:  ? Drainage, swelling, or redness around the puncture site  ? Chills or fever greater than 101 degrees  ? Increased soreness, pain and/or numbness at puncture site.    If you are not able to contact your doctor, call 911 and/or go to the nearest hospital.     How to Reach your Doctor:    Call Dr. Harrison at [phone] with problems or questions.   This info is a general resource. It is not meant to replace your health care provider’s  advice.  Ask your doctor or health care team any questions. Always follow their instructions.

## 2024-09-20 NOTE — Clinical Note
Vessel(s): left brachial artery-Cephalic vein AV fistula. Injected with hand injections. Multiple views taken.

## 2024-09-20 NOTE — OP NOTE
Peripheral Fistulagram W/ Declot *labs on admit* (L) Operative Note     Date: 2024  OR Location: [facility] Cardiac Cath Lab    Name: Eli Zavala, : 1950, Age: 73 y.o., MRN: [id number], Sex: female    Diagnosis  Pre-op Diagnosis      * Malfunction of arteriovenous dialysis fistula, initial encounter (CMS-HCC) [T82.590A]     * Stage 5 chronic kidney disease on chronic dialysis (Multi) [N18.6, Z99.2] Post-op Diagnosis     * Malfunction of arteriovenous dialysis fistula, initial encounter (CMS-HCC) [T82.590A]     * Stage 5 chronic kidney disease on chronic dialysis (Multi) [N18.6, Z99.2]     Procedures  Peripheral Fistulagram W/ Declot *labs on admit*  04782 - IN PERQ THRMBC/NFS DIALYSIS CIRCUIT IMG DX ANGRPH      Surgeons      * Brandon Harrison - Primary    Resident/Fellow/Other Assistant:  Surgeons and Role:  * No surgeons found with a matching role *    Procedure Summary  Anesthesia: Anesthesia type not filed in the log.  ASA: ASA status not filed in the log.  Anesthesia Staff: No anesthesia staff entered.  Estimated Blood Loss: 5  mL  Intra-op Medications: * Intraprocedure medication information is unavailable because the case start and end events have not been set *      Intraprocedure I/O Totals       None           Specimen: No specimens collected     Staff:   Scrub Person: April  Scrub Person: Yvette  Circulator: Gianfranco         Drains and/or Catheters: * None in log *    Tourniquet Times:         Implants:     Findings: Patent large brachiocephalic arteriovenous fistula. 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     Indications: Eli Zavala is an 73 y.o. female who is having surgery for Malfunction of arteriovenous dialysis fistula, initial encounter (CMS-HCC) [T82.590A]  Stage 5 chronic kidney disease on chronic dialysis (Multi) [N18.6, Z99.2].     The patient was seen in the preoperative area. The risks, benefits, complications, treatment options, non-operative alternatives,  expected recovery and outcomes were discussed with the patient. The possibilities of reaction to medication, pulmonary aspiration, injury to surrounding structures, bleeding, recurrent infection, the need for additional procedures, failure to diagnose a condition, and creating a complication requiring transfusion or operation were discussed with the patient. The patient concurred with the proposed plan, giving informed consent.  The site of surgery was properly noted/marked if necessary per policy. The patient has been actively warmed in preoperative area. Preoperative antibiotics are not indicated. Venous thrombosis prophylaxis are not indicated.    Procedure Details:   , Left arm prepped and draped.  The fistula was accessed with ultrasound guidance.  After giving local anesthesia with a 1%.  Total volume of local anesthesia was 2 mL.  Micropuncture access to the fistula and fistulogram performed.  This showed the fistula to be large and straight in the left arm suitable from absence.    There is some collateralization at the shoulder and this is likely due to the central venous occlusion seen at the confluence of the left brachiocephalic vein to the superior vena cava.  I switched the sheath for a long 5 Kiswahili sheath and using a glide catheter and Glidewire over attempted to cross the occlusion into the superior vena cava.  My got into a small collateral and being concerned of perforating here which would be quite dangerous due to the pressurization of the venous system secondary to the fistula, I stopped.    The patient is having difficulty with dialysis access.  I believe this is due to the unfamiliarity of new dialysis center staff with fistula access.  The fistula should be accessed over its entire length and I spoke with the patient about using the fistula right above the antecubital fossa where it is quite large.  The last ultrasound showed the flows in the fistula to be over 600 mL/min.    Her primary  issue is that with dialysis from fistula she develops hypotension which has more to do with cardiac function and her peripheral vascular resistance.  Will refer this back to Dr. Guzman.      Complications:  None; patient tolerated the procedure well.    Disposition: PACU - hemodynamically stable.  Condition: stable         Additional Details:     Attending Attestation: I was present and scrubbed for the entire procedure.    Brandon Harrison  Phone Number: [phone]

## 2024-09-23 ENCOUNTER — APPOINTMENT (OUTPATIENT)
Dept: CARDIOLOGY | Facility: HOSPITAL | Age: 74
End: 2024-09-23
Payer: MEDICARE

## 2024-09-23 ENCOUNTER — OFFICE VISIT (OUTPATIENT)
Dept: VASCULAR SURGERY | Facility: CLINIC | Age: 74
End: 2024-09-23
Payer: MEDICARE

## 2024-09-23 VITALS — SYSTOLIC BLOOD PRESSURE: 140 MMHG | RESPIRATION RATE: 18 BRPM | DIASTOLIC BLOOD PRESSURE: 74 MMHG | HEART RATE: 69 BPM

## 2024-09-23 DIAGNOSIS — N18.6 CKD (CHRONIC KIDNEY DISEASE) REQUIRING CHRONIC DIALYSIS (MULTI): ICD-10-CM

## 2024-09-23 DIAGNOSIS — T82.590A MALFUNCTION OF ARTERIOVENOUS DIALYSIS FISTULA, INITIAL ENCOUNTER (CMS-HCC): Primary | ICD-10-CM

## 2024-09-23 DIAGNOSIS — Z99.2 CKD (CHRONIC KIDNEY DISEASE) REQUIRING CHRONIC DIALYSIS (MULTI): ICD-10-CM

## 2024-09-23 PROCEDURE — 1157F ADVNC CARE PLAN IN RCRD: CPT | Performed by: NURSE PRACTITIONER

## 2024-09-23 PROCEDURE — 3078F DIAST BP <80 MM HG: CPT | Performed by: NURSE PRACTITIONER

## 2024-09-23 PROCEDURE — 99212 OFFICE O/P EST SF 10 MIN: CPT | Performed by: NURSE PRACTITIONER

## 2024-09-23 PROCEDURE — 1036F TOBACCO NON-USER: CPT | Performed by: NURSE PRACTITIONER

## 2024-09-23 PROCEDURE — 3052F HG A1C>EQUAL 8.0%<EQUAL 9.0%: CPT | Performed by: NURSE PRACTITIONER

## 2024-09-23 PROCEDURE — 1159F MED LIST DOCD IN RCRD: CPT | Performed by: NURSE PRACTITIONER

## 2024-09-23 PROCEDURE — 3077F SYST BP >= 140 MM HG: CPT | Performed by: NURSE PRACTITIONER

## 2024-09-23 PROCEDURE — 1160F RVW MEDS BY RX/DR IN RCRD: CPT | Performed by: NURSE PRACTITIONER

## 2024-09-23 ASSESSMENT — ENCOUNTER SYMPTOMS
LOSS OF SENSATION IN FEET: 1
OCCASIONAL FEELINGS OF UNSTEADINESS: 1
DEPRESSION: 0

## 2024-09-23 ASSESSMENT — LIFESTYLE VARIABLES
SKIP TO QUESTIONS 9-10: 1
HOW MANY STANDARD DRINKS CONTAINING ALCOHOL DO YOU HAVE ON A TYPICAL DAY: PATIENT DOES NOT DRINK
HOW OFTEN DO YOU HAVE A DRINK CONTAINING ALCOHOL: NEVER
AUDIT-C TOTAL SCORE: 0
HOW OFTEN DO YOU HAVE SIX OR MORE DRINKS ON ONE OCCASION: NEVER

## 2024-09-23 ASSESSMENT — PATIENT HEALTH QUESTIONNAIRE - PHQ9
2. FEELING DOWN, DEPRESSED OR HOPELESS: NOT AT ALL
1. LITTLE INTEREST OR PLEASURE IN DOING THINGS: NOT AT ALL
SUM OF ALL RESPONSES TO PHQ9 QUESTIONS 1 AND 2: 0

## 2024-09-23 ASSESSMENT — PAIN SCALES - GENERAL: PAINLEVEL_OUTOF10: 0 - NO PAIN

## 2024-09-23 NOTE — PROGRESS NOTES
Subjective   Patient ID: Eli Zavala is a 73 y.o. female who presents for s/p fistulagram    HPI  73 year old female s/p fistulagram by Dr Harrison on 9/20/24.  She has a large patent large brachiocephalic arteriovenous fistula. 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 Dr Harrison had concerns regarding difficulty with dialysis access likely due to unfamiliarity of new dialysis center staff with fistula access. The fistula should be accessed over its entire length and I spoke with the patient about using the fistula right above the antecubital fossa where it is quite large. The last ultrasound showed the flows in the fistula to be over 600 mL/min.  She was referred back to Dr Guzman for further evaluation of hypotension during dialysis. She is here today with concerns of post op bruising and swelling. She is on Coumadin.  There is no erythema present.  She has new slightly aneurysmal area, also has older aneurysmal area as well.  There is no increased calor.  Minimally tender.      Review of Systems  As noted in the HPI    Objective   Physical Exam  Constitutional:  Alert and oriented to person, place, date/time in no acute distress.  HEENT:  Atraumatic, normocephalic. PERRL. EOMI.  Nares patent.  Mucous membranes moist.  .   Neck:  Trachea midline.  Respiratory:  Clear to auscultation.  Cardiac:  Regular rate and rhythm.    Cardiovascular:  No edema of the extremities.  Pulse exam: Radial pulses palpable.   Abdominal:  Soft, nontender, nondistended, bowel sounds present.  Musculoskeletal:  Moves extremities freely.  Dermatological: Clean and dry   Neurological: Alert and oriented to person, place, date/time  Psych:  Calm, cooperative  Dialysis access: Left brachiocephalic arteriovenous fistula.  Thrill palpated.  Bruit present.  2  aneurysmal areas, 1 has been present for years.    Assessment/Plan     Left brachiocephalic arteriovenous fistula by Dr. Curiel.  s/p fistulagram by Dr Harrison on 9/20/24.       Advised tubipgrip compression stocking to wear during the day and remove at night for the next week  Elevation and decreased use of the extremity for the next couple of days  Continue dialysis via right chest tunneled catheter  Okay to access fistula site next week once ecchymosis is more resolved.  Suture removed at today's appointment.  Per Dr. Harrison, the fistula should be accessed over its entire length and I spoke with the patient about using the fistula right above the antecubital fossa where it is quite large. The last ultrasound showed the flows in the fistula to be over 600 mL/min  RTC PRN       Leslie Rosas, APRN-CNP 09/23/24 1:14 PM

## 2024-09-30 ENCOUNTER — ANTICOAGULATION - WARFARIN VISIT (OUTPATIENT)
Dept: CARDIOLOGY | Facility: HOSPITAL | Age: 74
End: 2024-09-30
Payer: MEDICARE

## 2024-09-30 DIAGNOSIS — I48.20 CHRONIC ATRIAL FIBRILLATION (MULTI): ICD-10-CM

## 2024-10-21 NOTE — PROGRESS NOTES
"[facility] Pain Management  New Patient Office Visit Note 10/23/2024    Patient Information: Eli Zavala, MRN: [id number], : 1950   Primary Care/Referring Physician: Jese Bonilla MD, [id number] / [place] [id number]     Chief Complaint: Neck pain, low back pain, bilateral foot pain  History of Pain: Ms. Eli Zavala is a 73 y.o. female with a PMHx of HTN, HLD, T2DM (A1c 9.0%), CAD, Afib (on Warfarin), CVA, ESRD (on IHD on Tues, Th, Sat)< hx of DVT, GERD, COPD, temporal arteritis who presents for neck and shoulder pain, low back pain, and bilateral foot pain     Currently her worst pain is in the neck/shoulders. She reports onset of pain approximately 2 weeks ago with no obvious inciting event. She describes pain bilaterally in the neck and shoulders, with significant pain with rotation in either direction as well as flexion and extension. Her neck feels very stiff. She denies any numbness, tingling, or weakness in her arms. She is using a heating pad on the neck with minimal relief. She tried lidocaine patches with no benefit    She also reports low back pain which started many years ago. She describes primarily axial low back pain which worsens significantly when she tries to straighten her back after bending forward. She does get some pain relief from lying down flat and sitting. She denies any pain radiating down her legs. She uses a walker at home to get around.     She also reports neuropathy in her feet since chemotherapy around 10 years ago. This is described as a \"stinging\" pain and is significantly worse at night, making it difficult to sleep    Current Pain Medications: OTC Tylenol  Previously Tried Pain Medications:    Relevant Surgeries: Denies lumbar or cervical spine surgery. She had a right ankle ORIF due to fracture  Injections: She reports undergoing some type of low back injections in the past (details unclear, ?SI joint injections)  Physical/Occupational Therapy: She was " "referred to PT for her neck but hasn't started this yet    Medications:   Current Outpatient Medications   Medication Instructions    acetaminophen (TYLENOL) 650 mg, Every 6 hours PRN    atorvastatin (LIPITOR) 40 mg, Daily    azithromycin (ZITHROMAX) 500 mg, Daily    BD AutoShield Duo Pen Needle 30 gauge x 3/16\" needle MAY USE TO INJECT UP TO FOUR TIMES DAILY    butalbital-acetaminophen-caff -40 mg tablet Take by mouth.    calcium carbonate (Tums) 200 mg calcium chewable tablet Every 12 hours PRN    carvedilol (COREG) 25 mg, 2 times daily (morning and late afternoon)    chlorhexidine (Peridex) 0.12 % solution RINSE AND SPIT TWICE A DAY 1/2 OZ FOR 30 SECONDS, START DAY AFTER SX    clobetasol (Temovate) 0.05 % cream Apply to affected vaginal area 2 times a week    clotrimazole (Lotrimin) 1 % cream APPLY TOPICALLY TO AFFECTED AREA 2 TIMES A DAY FOR 7 DAYS    dextrose 15 gram/32 mL gel in packet Take by mouth.    diclofenac (Voltaren) 0.1 % ophthalmic solution 1 DROP IN AFFECTED EYES 4 TIMES A DAY AS NEEDED    fluocinolone (DermOtic) 0.01 % ear drops INSTIL 4 DROPS TO AFFECTED EAR(S) TWICE A DAY FOR 7 DAYS AND AS NEEDED    FreeStyle Ron 2 [place] [facility] USE AS DIRECTED    FreeStyle Ron 2 Sensor kit     gabapentin (NEURONTIN) 100 mg, 2 times daily    insulin glargine (LANTUS) 10 Units, Nightly    insulin lispro (HumaLOG KwikPen Insulin) 100 unit/mL injection Inject under the skin. INJECT ACCORDING TO SLIDING SCALE ( 4 18 UNITS BEFORE MEALS)    isosorbide mononitrate ER (IMDUR) 30 mg, Daily    ketoconazole (NIZOral) 2 % cream 2 times daily    levothyroxine (SYNTHROID) 75 mcg, Daily before breakfast    niacin 50 mg, Daily    nystatin (Mycostatin) cream 1 Application, Topical, 2 times daily    pantoprazole (PROTONIX) 40 mg, Daily    polyethylene glycol-electrolytes (polyethylene glycol) 420 gram solution As directed for outpatient colonoscopy    prednisoLONE acetate (Pred-Forte) 1 % ophthalmic suspension INSTILL 1 " DROP INTO RIGHT EYE 4 TIMES A DAY    Purelax 17 gram/dose powder DISSOLVE 17 GM(S) IN 8 OUNCES OF WATER AND DRINK DAILY    Repatha SureClick 140 mg, Every 14 days    sennosides-docusate sodium (Senokot-S) 8.6-50 mg tablet 1 tablet, Daily RT    sodium bicarbonate 650 mg tablet TAKE 2 TABLETS BY MOUTH EVERY DAY FOR 90 DAYS    Stool Softener 100 mg, 2 times daily    torsemide (Demadex) 100 mg tablet 0.5 tablets, Daily    warfarin (COUMADIN) 2.5 mg    warfarin (COUMADIN) 5 mg      Allergies:   Allergies   Allergen Reactions    Levofloxacin Itching and Unknown    Atorvastatin Other and Unknown    Fentanyl Nausea/vomiting    Heparin (Porcine) Nausea/vomiting    Sulfamethoxazole-Trimethoprim Other and Unknown    Adhesive Tape-Silicones Unknown    Amoxicillin Unknown    Omeprazole Unknown    Thiopental Unknown    Sulfa (Sulfonamide Antibiotics) Itching and Unknown       Past Medical & Surgical History:  Past Medical History:   Diagnosis Date    Atherosclerotic heart disease of native coronary artery without angina pectoris     Coronary heart disease    Chronic kidney disease, stage 3 unspecified (Multi)     Chronic kidney disease, stage III (moderate)    Hyperuricemia without signs of inflammatory arthritis and tophaceous disease     Asymptomatic hyperuricemia    Personal history of diseases of the blood and blood-forming organs and certain disorders involving the immune mechanism     History of anemia    Personal history of Hodgkin lymphoma     History of Hodgkin's lymphoma    Personal history of other diseases of the circulatory system     History of essential hypertension    Personal history of other diseases of the digestive system     History of gastroesophageal reflux (GERD)    Personal history of other diseases of the musculoskeletal system and connective tissue     Personal history of arthritis    Personal history of other endocrine, nutritional and metabolic disease     History of hypothyroidism    Personal history  of other endocrine, nutritional and metabolic disease 05/03/2021    History of type 2 diabetes mellitus    Personal history of other endocrine, nutritional and metabolic disease     History of hypercholesterolemia    Personal history of other endocrine, nutritional and metabolic disease     History of thyroid disease    Personal history of other specified conditions     History of shortness of breath    Type 2 diabetes mellitus with other diabetic kidney complication     Type 2 diabetes mellitus with renal manifestations      Past Surgical History:   Procedure Laterality Date    CHOLECYSTECTOMY  03/11/2014    Cholecystectomy Laparoscopic    INVASIVE VASCULAR PROCEDURE Left 9/20/2024    Procedure: Peripheral Fistulagram W/ Declot *labs on admit*;  Surgeon: Brandon Harrison MD;  Location: [facility] Cardiac Cath Lab;  Service: Vascular Surgery;  Laterality: Left;  *labs on admit*    MR HEAD ANGIO WO IV CONTRAST  8/16/2018    MR HEAD ANGIO WO IV CONTRAST [facility]    MR NECK ANGIO WO IV CONTRAST  8/16/2018    MR NECK ANGIO WO IV CONTRAST [facility]    OTHER SURGICAL HISTORY  09/16/2021    Hysterectomy    SINUS SURGERY  08/21/2014    Sinus Surgery    TONSILLECTOMY  08/21/2014    Tonsillectomy       No family history on file.  Social History     Socioeconomic History    Marital status:      Spouse name: Not on file    Number of children: Not on file    Years of education: Not on file    Highest education level: Not on file   Occupational History    Not on file   Tobacco Use    Smoking status: Never     Passive exposure: Never    Smokeless tobacco: Never   Substance and Sexual Activity    Alcohol use: Not Currently    Drug use: Never    Sexual activity: Defer   Other Topics Concern    Not on file   Social History Narrative    Not on file     Social Drivers of Health     Financial Resource Strain: Low Risk  (11/1/2022)    [facility]    Overall Financial Resource  "Strain (CARDIA)     Difficulty of Paying Living Expenses: Not hard at all   Food Insecurity: No Food Insecurity (11/1/2022)    Received from [facility]    Hunger Vital Sign     Worried About Running Out of Food in the Last Year: Never true     Ran Out of Food in the Last Year: Never true   Transportation Needs: No Transportation Needs (11/1/2022)    Received from [facility]    PRAPARE - Transportation     Lack of Transportation (Medical): No     Lack of Transportation (Non-Medical): No   Physical Activity: Not on file   Stress: Not on file   Social Connections: Not on file   Intimate Partner Violence: Not on file   Housing Stability: Low Risk  (11/1/2022)    Received from [facility]    Housing Stability Vital Sign     Unable to Pay for Housing in the Last Year: No     Number of Places Lived in the Last Year: 1     Unstable Housing in the Last Year: No       Problems, Past medical history, past surgical history, Medications, allergies, social and family history reviewed and as per the electronic medical record from today's encounter    Review of Systems:  CONST: No fever, chills, fatigue, weight changes  EYES: No loss of vision  ENT: No hearing loss, tinnitus  CV: No chest pain, palpitations  RESP: No dyspnea, shortness of breath, cough  GI: No stool incontinence, nausea, vomiting  : No urinary incontinence  MSK: No joint swelling  SKIN: No rash, no hives  NEURO: No headache, dizziness  PSYCH: No anxiety, depression  HEM/LYMPH: No easy bruising or bleeding  All other systems reviewed are negative     Physical Exam:  Vitals: /64   Pulse 81   Resp 18   Ht 1.651 m (5' 5\")   Wt 67.6 kg (149 lb)   LMP  (LMP Unknown)   SpO2 99%   BMI 24.79 kg/m²   General: No apparent distress. Alert, appropriate, oriented x 3. Mood and affect normal. Speaking in full sentences.  HENT: Normocephalic, atraumatic. Hearing intact.  Eyes: Extraocular " movements grossly intact. Pupils equal and round.   Neck: Supple, trachea midline.  Lungs: Symmetric respiratory excursion on visual exam, nonlabored breathing.  Extremities: No clubbing, cyanosis, or edema noted in arms or legs.  Skin: No rashes, lesions, alopecia noted on back or extremities.   Neck: Reports pain with flexion, extension, rotation in either direction. She reports pain to palpation of bilateral cervical paraspinal and trapezius muscles. No tenderness over the spinous processes  Back: Reports pain with extension and lumbar facet loading maneuvers bilaterally. No tenderness over the spinous processes GTB bilaterally. Reports some tenderness to palpation near the lumbosacral spine bilaterally. No spasm noted over musculature. No misalignment or asymmetry noted.  Neuro: Alert and appropriate. Using a wheelchair. Gait within normal limits. Bulk and tone within normal limits.    Laboratory Data:  The following laboratory data were reviewed during this visit:   Lab Results   Component Value Date    WBC 4.8 09/20/2024    RBC 4.34 09/20/2024    HGB 14.2 09/20/2024    HCT 42.4 09/20/2024     09/20/2024      Lab Results   Component Value Date    INR 1.30 08/26/2024    INR 1.30 07/22/2024    INR 1.60 07/01/2024     Lab Results   Component Value Date    CREATININE 3.61 (H) 09/20/2024    HGBA1C 9.0 (H) 09/20/2024       Imaging:  The following imaging impressions were reviewed by me during this visit:    -10/20/2020 lumbar spine MRI shows lumbar facet arthropathy in the lower lumbar spine with no significant central or foraminal stenosis     I also personally reviewed the images from the above studies myself. These images and my interpretation of them contributed to the management and decision making of the patient's medical plan.    ASSESSMENT:  Ms. Eli Zavala is a 73 y.o. female with neck, low back, and bilateral foot pain that is consistent with:    1. Myofascial pain    2. Arthropathy of cervical facet  joint    3. Lumbar facet arthropathy    4. Chemotherapy-induced peripheral neuropathy (Multi)    5. Painful diabetic neuropathy (Multi)        PLAN:    Diagnostics:   - I reviewed her most recent cervical spine CT and lumbar spine MRI. No further diagnostics are indicated at this time.    Physical Therapy and Rehabilitation:     - She has been referred to PT for her neck    Psychologically:  - No needs at this time    Medications  - No changes to medication at this time. Given her medical co-morbidities her medication options are fairly limited    Duration  - Low back pain for multiple years, with neck pain for 2 weeks    Interventions:  - I suspect her neck pain is primarily myofascial in nature, with possible contributions from cervical facet arthropathy. Given the severity of her pain and limited medication options I recommend trigger point injections to 3 or more muscle groups utilizing bupivacaine 0.5%. Risks, benefits, alternatives discussed. She would like to proceed  - I suspect her low back pain is secondary to lumbar facet arthropathy, with possible contributions from vertebrogenic low back pain. I recommend diagnostic bilateral lumbar medial branch nerve blocks at L4/5, L5/S1 utilizing live fluoroscopy and local anesthesia, with consideration for RF ablation in the future. Risks, benefits, alternatives discussed. 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 I believe she would be a reasonable candidate for the Scrambler therapy targeting the bilateral L5 and S1 dermatomes in the feet. Of note, she is on dialysis Tuesday, Thursday, and Saturday from 10 am to 3 pm thus will have to schedule around those visits.         Sincerely,  Perico Roberts MD  [facility] - [place]

## 2024-10-23 ENCOUNTER — APPOINTMENT (OUTPATIENT)
Dept: PAIN MEDICINE | Facility: CLINIC | Age: 74
End: 2024-10-23
Payer: MEDICARE

## 2024-10-23 ENCOUNTER — OFFICE VISIT (OUTPATIENT)
Dept: PAIN MEDICINE | Facility: CLINIC | Age: 74
End: 2024-10-23
Payer: MEDICARE

## 2024-10-23 VITALS
OXYGEN SATURATION: 99 % | WEIGHT: 149 LBS | HEIGHT: 65 IN | HEART RATE: 81 BPM | SYSTOLIC BLOOD PRESSURE: 132 MMHG | RESPIRATION RATE: 18 BRPM | BODY MASS INDEX: 24.83 KG/M2 | DIASTOLIC BLOOD PRESSURE: 64 MMHG

## 2024-10-23 DIAGNOSIS — G62.0 CHEMOTHERAPY-INDUCED PERIPHERAL NEUROPATHY (MULTI): ICD-10-CM

## 2024-10-23 DIAGNOSIS — M47.816 LUMBAR FACET ARTHROPATHY: ICD-10-CM

## 2024-10-23 DIAGNOSIS — E11.40 PAINFUL DIABETIC NEUROPATHY (MULTI): ICD-10-CM

## 2024-10-23 DIAGNOSIS — T45.1X5A CHEMOTHERAPY-INDUCED PERIPHERAL NEUROPATHY (MULTI): ICD-10-CM

## 2024-10-23 DIAGNOSIS — M79.18 MYOFASCIAL PAIN: Primary | ICD-10-CM

## 2024-10-23 DIAGNOSIS — M47.812 ARTHROPATHY OF CERVICAL FACET JOINT: ICD-10-CM

## 2024-10-23 PROCEDURE — 1157F ADVNC CARE PLAN IN RCRD: CPT | Performed by: STUDENT IN AN ORGANIZED HEALTH CARE EDUCATION/TRAINING PROGRAM

## 2024-10-23 PROCEDURE — 99214 OFFICE O/P EST MOD 30 MIN: CPT | Performed by: STUDENT IN AN ORGANIZED HEALTH CARE EDUCATION/TRAINING PROGRAM

## 2024-10-23 PROCEDURE — 1036F TOBACCO NON-USER: CPT | Performed by: STUDENT IN AN ORGANIZED HEALTH CARE EDUCATION/TRAINING PROGRAM

## 2024-10-23 PROCEDURE — 1159F MED LIST DOCD IN RCRD: CPT | Performed by: STUDENT IN AN ORGANIZED HEALTH CARE EDUCATION/TRAINING PROGRAM

## 2024-10-23 PROCEDURE — 99204 OFFICE O/P NEW MOD 45 MIN: CPT | Performed by: STUDENT IN AN ORGANIZED HEALTH CARE EDUCATION/TRAINING PROGRAM

## 2024-10-23 PROCEDURE — 3008F BODY MASS INDEX DOCD: CPT | Performed by: STUDENT IN AN ORGANIZED HEALTH CARE EDUCATION/TRAINING PROGRAM

## 2024-10-23 PROCEDURE — 1125F AMNT PAIN NOTED PAIN PRSNT: CPT | Performed by: STUDENT IN AN ORGANIZED HEALTH CARE EDUCATION/TRAINING PROGRAM

## 2024-10-23 PROCEDURE — 3075F SYST BP GE 130 - 139MM HG: CPT | Performed by: STUDENT IN AN ORGANIZED HEALTH CARE EDUCATION/TRAINING PROGRAM

## 2024-10-23 PROCEDURE — 3078F DIAST BP <80 MM HG: CPT | Performed by: STUDENT IN AN ORGANIZED HEALTH CARE EDUCATION/TRAINING PROGRAM

## 2024-10-23 PROCEDURE — 1160F RVW MEDS BY RX/DR IN RCRD: CPT | Performed by: STUDENT IN AN ORGANIZED HEALTH CARE EDUCATION/TRAINING PROGRAM

## 2024-10-23 PROCEDURE — 3052F HG A1C>EQUAL 8.0%<EQUAL 9.0%: CPT | Performed by: STUDENT IN AN ORGANIZED HEALTH CARE EDUCATION/TRAINING PROGRAM

## 2024-10-23 ASSESSMENT — PAIN DESCRIPTION - DESCRIPTORS: DESCRIPTORS: SHARP

## 2024-10-23 ASSESSMENT — LIFESTYLE VARIABLES: TOTAL SCORE: 0

## 2024-10-23 ASSESSMENT — PATIENT HEALTH QUESTIONNAIRE - PHQ9
10. IF YOU CHECKED OFF ANY PROBLEMS, HOW DIFFICULT HAVE THESE PROBLEMS MADE IT FOR YOU TO DO YOUR WORK, TAKE CARE OF THINGS AT HOME, OR GET ALONG WITH OTHER PEOPLE: EXTREMELY DIFFICULT
SUM OF ALL RESPONSES TO PHQ9 QUESTIONS 1 AND 2: 2
1. LITTLE INTEREST OR PLEASURE IN DOING THINGS: SEVERAL DAYS
2. FEELING DOWN, DEPRESSED OR HOPELESS: SEVERAL DAYS

## 2024-10-23 ASSESSMENT — PAIN SCALES - GENERAL
PAINLEVEL_OUTOF10: 10 - WORST POSSIBLE PAIN
PAINLEVEL_OUTOF10: 10-WORST PAIN EVER

## 2024-10-23 ASSESSMENT — PAIN - FUNCTIONAL ASSESSMENT: PAIN_FUNCTIONAL_ASSESSMENT: 0-10

## 2024-10-24 ENCOUNTER — TELEPHONE (OUTPATIENT)
Dept: PAIN MEDICINE | Facility: CLINIC | Age: 74
End: 2024-10-24
Payer: MEDICARE

## 2024-10-24 ENCOUNTER — ANTICOAGULATION - WARFARIN VISIT (OUTPATIENT)
Dept: CARDIOLOGY | Facility: HOSPITAL | Age: 74
End: 2024-10-24
Payer: MEDICARE

## 2024-10-24 DIAGNOSIS — I48.20 CHRONIC ATRIAL FIBRILLATION (MULTI): ICD-10-CM

## 2024-10-24 NOTE — PROGRESS NOTES
Attempted to call to reschedule appointment, unable to leave a message as voicemail is full. Will try again next week

## 2024-10-24 NOTE — TELEPHONE ENCOUNTER
Spoke to Dante.   Patient scheduled for [place] therapy with [facility] in [place].     Patient schedule for TPI with AG at [place] office.    Patient scheduled for MBB with AG at [facility].     Patient scheduled for follow up from nerve block with AG at [facility].

## 2024-10-25 ENCOUNTER — OFFICE VISIT (OUTPATIENT)
Dept: URGENT CARE | Age: 74
End: 2024-10-25
Payer: MEDICARE

## 2024-10-25 ENCOUNTER — OFFICE VISIT (OUTPATIENT)
Dept: PAIN MEDICINE | Facility: CLINIC | Age: 74
End: 2024-10-25
Payer: MEDICARE

## 2024-10-25 ENCOUNTER — TELEPHONE (OUTPATIENT)
Dept: UROLOGY | Facility: CLINIC | Age: 74
End: 2024-10-25

## 2024-10-25 ENCOUNTER — APPOINTMENT (OUTPATIENT)
Dept: PAIN MEDICINE | Facility: CLINIC | Age: 74
End: 2024-10-25
Payer: COMMERCIAL

## 2024-10-25 VITALS
OXYGEN SATURATION: 97 % | RESPIRATION RATE: 18 BRPM | BODY MASS INDEX: 24.96 KG/M2 | SYSTOLIC BLOOD PRESSURE: 127 MMHG | DIASTOLIC BLOOD PRESSURE: 86 MMHG | HEART RATE: 80 BPM | TEMPERATURE: 97.7 F | WEIGHT: 150 LBS

## 2024-10-25 VITALS
HEART RATE: 83 BPM | WEIGHT: 149 LBS | SYSTOLIC BLOOD PRESSURE: 175 MMHG | BODY MASS INDEX: 24.83 KG/M2 | DIASTOLIC BLOOD PRESSURE: 76 MMHG | OXYGEN SATURATION: 98 % | HEIGHT: 65 IN

## 2024-10-25 DIAGNOSIS — M79.18 MYOFASCIAL PAIN: ICD-10-CM

## 2024-10-25 DIAGNOSIS — N30.01 ACUTE CYSTITIS WITH HEMATURIA: Primary | ICD-10-CM

## 2024-10-25 DIAGNOSIS — R39.9 UTI SYMPTOMS: ICD-10-CM

## 2024-10-25 DIAGNOSIS — B37.2 YEAST DERMATITIS: ICD-10-CM

## 2024-10-25 LAB
POC APPEARANCE, URINE: ABNORMAL
POC BILIRUBIN, URINE: ABNORMAL
POC BLOOD, URINE: ABNORMAL
POC COLOR, URINE: ABNORMAL
POC GLUCOSE, URINE: ABNORMAL MG/DL
POC KETONES, URINE: NEGATIVE MG/DL
POC LEUKOCYTES, URINE: ABNORMAL
POC NITRITE,URINE: NEGATIVE
POC PH, URINE: 6 PH
POC PROTEIN, URINE: ABNORMAL MG/DL
POC SPECIFIC GRAVITY, URINE: 1.02
POC UROBILINOGEN, URINE: 0.2 EU/DL

## 2024-10-25 PROCEDURE — 3077F SYST BP >= 140 MM HG: CPT | Performed by: STUDENT IN AN ORGANIZED HEALTH CARE EDUCATION/TRAINING PROGRAM

## 2024-10-25 PROCEDURE — 3078F DIAST BP <80 MM HG: CPT | Performed by: STUDENT IN AN ORGANIZED HEALTH CARE EDUCATION/TRAINING PROGRAM

## 2024-10-25 PROCEDURE — 1159F MED LIST DOCD IN RCRD: CPT | Performed by: STUDENT IN AN ORGANIZED HEALTH CARE EDUCATION/TRAINING PROGRAM

## 2024-10-25 PROCEDURE — 2500000004 HC RX 250 GENERAL PHARMACY W/ HCPCS (ALT 636 FOR OP/ED): Performed by: STUDENT IN AN ORGANIZED HEALTH CARE EDUCATION/TRAINING PROGRAM

## 2024-10-25 PROCEDURE — 1157F ADVNC CARE PLAN IN RCRD: CPT | Performed by: STUDENT IN AN ORGANIZED HEALTH CARE EDUCATION/TRAINING PROGRAM

## 2024-10-25 PROCEDURE — 96372 THER/PROPH/DIAG INJ SC/IM: CPT | Performed by: STUDENT IN AN ORGANIZED HEALTH CARE EDUCATION/TRAINING PROGRAM

## 2024-10-25 PROCEDURE — 87186 SC STD MICRODIL/AGAR DIL: CPT

## 2024-10-25 PROCEDURE — 1036F TOBACCO NON-USER: CPT | Performed by: STUDENT IN AN ORGANIZED HEALTH CARE EDUCATION/TRAINING PROGRAM

## 2024-10-25 PROCEDURE — 3052F HG A1C>EQUAL 8.0%<EQUAL 9.0%: CPT | Performed by: STUDENT IN AN ORGANIZED HEALTH CARE EDUCATION/TRAINING PROGRAM

## 2024-10-25 PROCEDURE — 1125F AMNT PAIN NOTED PAIN PRSNT: CPT | Performed by: STUDENT IN AN ORGANIZED HEALTH CARE EDUCATION/TRAINING PROGRAM

## 2024-10-25 PROCEDURE — 1160F RVW MEDS BY RX/DR IN RCRD: CPT | Performed by: STUDENT IN AN ORGANIZED HEALTH CARE EDUCATION/TRAINING PROGRAM

## 2024-10-25 PROCEDURE — 3008F BODY MASS INDEX DOCD: CPT | Performed by: STUDENT IN AN ORGANIZED HEALTH CARE EDUCATION/TRAINING PROGRAM

## 2024-10-25 PROCEDURE — 20553 NJX 1/MLT TRIGGER POINTS 3/>: CPT | Performed by: STUDENT IN AN ORGANIZED HEALTH CARE EDUCATION/TRAINING PROGRAM

## 2024-10-25 PROCEDURE — 87086 URINE CULTURE/COLONY COUNT: CPT

## 2024-10-25 RX ORDER — TRIAMCINOLONE ACETONIDE 40 MG/ML
10 INJECTION, SUSPENSION INTRA-ARTICULAR; INTRAMUSCULAR ONCE
Status: COMPLETED | OUTPATIENT
Start: 2024-10-25 | End: 2024-10-25

## 2024-10-25 RX ORDER — BUPIVACAINE HYDROCHLORIDE 5 MG/ML
5 INJECTION, SOLUTION EPIDURAL; INTRACAUDAL ONCE
Status: COMPLETED | OUTPATIENT
Start: 2024-10-25 | End: 2024-10-25

## 2024-10-25 RX ORDER — CEFPODOXIME PROXETIL 100 MG/1
100 TABLET, FILM COATED ORAL 2 TIMES DAILY
Qty: 14 TABLET | Refills: 0 | Status: SHIPPED | OUTPATIENT
Start: 2024-10-25 | End: 2024-11-01

## 2024-10-25 ASSESSMENT — PATIENT HEALTH QUESTIONNAIRE - PHQ9
SUM OF ALL RESPONSES TO PHQ9 QUESTIONS 1 AND 2: 0
2. FEELING DOWN, DEPRESSED OR HOPELESS: NOT AT ALL
1. LITTLE INTEREST OR PLEASURE IN DOING THINGS: NOT AT ALL

## 2024-10-25 ASSESSMENT — PAIN SCALES - GENERAL: PAINLEVEL_OUTOF10: 10-WORST PAIN EVER

## 2024-10-25 NOTE — H&P (VIEW-ONLY)
"[facility]  Follow Up Office Visit Note 10/25/2024    Patient Information: Eli Zavala, MRN: [id number], : 1950   Primary Care/Referring Physician: Jese Bonilla MD, [id number] / [place] [id number]     Chief Complaint: Neck pain, low back pain, bilateral foot pain    Interval History: Ms. Zavala presents today for trigger point injections    She denies any changes since last seen in the office. She continues to have bilateral neck and shoulder pain    Brief History of Pain: Ms. Eli Zavala is a 73 y.o. female with a PMHx of HTN, HLD, T2DM (A1c 9.0%), CAD, Afib (on Warfarin), CVA, ESRD (on IHD on Tues, Thurs, Sat)< hx of DVT, GERD, COPD, temporal arteritis who presents for neck and shoulder pain, low back pain, and bilateral foot pain     Currently her worst pain is in the neck/shoulders. She reports onset of pain approximately 2 weeks ago with no obvious inciting event. She describes pain bilaterally in the neck and shoulders, with significant pain with rotation in either direction as well as flexion and extension. Her neck feels very stiff. She denies any numbness, tingling, or weakness in her arms. She is using a heating pad on the neck with minimal relief. She tried lidocaine patches with no benefit    She also reports low back pain which started many years ago. She describes primarily axial low back pain which worsens significantly when she tries to straighten her back after bending forward. She does get some pain relief from lying down flat and sitting. She denies any pain radiating down her legs. She uses a walker at home to get around.     She also reports neuropathy in her feet since chemotherapy around 10 years ago. This is described as a \"stinging\" pain and is significantly worse at night, making it difficult to sleep    Current Pain Medications: OTC Tylenol  Previously Tried Pain Medications:    Relevant Surgeries: Denies lumbar or cervical spine surgery. She had a " "right ankle ORIF due to fracture  Injections: She reports undergoing some type of low back injections in the past (details unclear, ?SI joint injections)  Physical/Occupational Therapy: She was referred to PT for her neck but hasn't started this yet    Medications:   Current Outpatient Medications   Medication Instructions    acetaminophen (TYLENOL) 650 mg, Every 6 hours PRN    atorvastatin (LIPITOR) 40 mg, Daily    azithromycin (ZITHROMAX) 500 mg, Daily    BD AutoShield Duo Pen Needle 30 gauge x 3/16\" needle MAY USE TO INJECT UP TO FOUR TIMES DAILY    butalbital-acetaminophen-caff -40 mg tablet Take by mouth.    calcium carbonate (Tums) 200 mg calcium chewable tablet Every 12 hours PRN    carvedilol (COREG) 25 mg, 2 times daily (morning and late afternoon)    cefpodoxime (VANTIN) 100 mg, oral, 2 times daily    chlorhexidine (Peridex) 0.12 % solution RINSE AND SPIT TWICE A DAY 1/2 OZ FOR 30 SECONDS, START DAY AFTER SX    clobetasol (Temovate) 0.05 % cream Apply to affected vaginal area 2 times a week    clotrimazole (Lotrimin) 1 % cream APPLY TOPICALLY TO AFFECTED AREA 2 TIMES A DAY FOR 7 DAYS    dextrose 15 gram/32 mL gel in packet Take by mouth.    diclofenac (Voltaren) 0.1 % ophthalmic solution 1 DROP IN AFFECTED EYES 4 TIMES A DAY AS NEEDED    fluocinolone (DermOtic) 0.01 % ear drops INSTIL 4 DROPS TO AFFECTED EAR(S) TWICE A DAY FOR 7 DAYS AND AS NEEDED    FreeStyle Ron 2 [facility] USE AS DIRECTED    FreeStyle Ron 2 Sensor kit     insulin glargine (LANTUS) 10 Units, Nightly    insulin lispro (HumaLOG KwikPen Insulin) 100 unit/mL injection Inject under the skin. INJECT ACCORDING TO SLIDING SCALE ( 4 18 UNITS BEFORE MEALS)    isosorbide mononitrate ER (IMDUR) 30 mg, Daily    ketoconazole (NIZOral) 2 % cream 2 times daily    levothyroxine (SYNTHROID) 75 mcg, Daily before breakfast    niacin 50 mg, Daily    nystatin (Mycostatin) cream 1 Application, Topical, 2 times daily    pantoprazole (PROTONIX) 40 " mg, Daily    polyethylene glycol-electrolytes (polyethylene glycol) 420 gram solution As directed for outpatient colonoscopy    prednisoLONE acetate (Pred-Forte) 1 % ophthalmic suspension     Purelax 17 gram/dose powder DISSOLVE 17 GM(S) IN 8 OUNCES OF WATER AND DRINK DAILY    Repatha SureClick 140 mg, Every 14 days    sennosides-docusate sodium (Senokot-S) 8.6-50 mg tablet 1 tablet, Daily RT    sodium bicarbonate 650 mg tablet TAKE 2 TABLETS BY MOUTH EVERY DAY FOR 90 DAYS    Stool Softener 100 mg, 2 times daily    torsemide (Demadex) 100 mg tablet 0.5 tablets, Daily    warfarin (COUMADIN) 2.5 mg    warfarin (COUMADIN) 5 mg      Allergies:   Allergies   Allergen Reactions    Levofloxacin Itching and Unknown    Atorvastatin Other and Unknown    Fentanyl Nausea/vomiting    Heparin (Porcine) Nausea/vomiting    Sulfamethoxazole-Trimethoprim Other and Unknown    Adhesive Tape-Silicones Unknown    Amoxicillin Unknown    Omeprazole Unknown    Thiopental Unknown    Sulfa (Sulfonamide Antibiotics) Itching and Unknown       Past Medical & Surgical History:  Past Medical History:   Diagnosis Date    Atherosclerotic heart disease of native coronary artery without angina pectoris     Coronary heart disease    Chronic kidney disease, stage 3 unspecified (Multi)     Chronic kidney disease, stage III (moderate)    Hyperuricemia without signs of inflammatory arthritis and tophaceous disease     Asymptomatic hyperuricemia    Personal history of diseases of the blood and blood-forming organs and certain disorders involving the immune mechanism     History of anemia    Personal history of Hodgkin lymphoma     History of Hodgkin's lymphoma    Personal history of other diseases of the circulatory system     History of essential hypertension    Personal history of other diseases of the digestive system     History of gastroesophageal reflux (GERD)    Personal history of other diseases of the musculoskeletal system and connective tissue      Personal history of arthritis    Personal history of other endocrine, nutritional and metabolic disease     History of hypothyroidism    Personal history of other endocrine, nutritional and metabolic disease 05/03/2021    History of type 2 diabetes mellitus    Personal history of other endocrine, nutritional and metabolic disease     History of hypercholesterolemia    Personal history of other endocrine, nutritional and metabolic disease     History of thyroid disease    Personal history of other specified conditions     History of shortness of breath    Type 2 diabetes mellitus with other diabetic kidney complication     Type 2 diabetes mellitus with renal manifestations      Past Surgical History:   Procedure Laterality Date    CHOLECYSTECTOMY  03/11/2014    Cholecystectomy Laparoscopic    INVASIVE VASCULAR PROCEDURE Left 9/20/2024    Procedure: Peripheral Fistulagram W/ Declot *labs on admit*;  Surgeon: Brandon Harrison MD;  Location: [facility];  Service: Vascular Surgery;  Laterality: Left;  *labs on admit*    MR HEAD ANGIO WO IV CONTRAST  8/16/2018    MR HEAD ANGIO WO IV CONTRAST [facility]    MR NECK ANGIO WO IV CONTRAST  8/16/2018    MR NECK ANGIO WO IV CONTRAST [facility]    OTHER SURGICAL HISTORY  09/16/2021    Hysterectomy    SINUS SURGERY  08/21/2014    Sinus Surgery    TONSILLECTOMY  08/21/2014    Tonsillectomy       No family history on file.  Social History     Socioeconomic History    Marital status:      Spouse name: Not on file    Number of children: Not on file    Years of education: Not on file    Highest education level: Not on file   Occupational History    Not on file   Tobacco Use    Smoking status: Never     Passive exposure: Never    Smokeless tobacco: Never   Substance and Sexual Activity    Alcohol use: Not Currently    Drug use: Never    Sexual activity: Defer   Other Topics Concern    Not on file   Social History Narrative    Not on file     Social  "Drivers of Health     Financial Resource Strain: Low Risk  (11/1/2022)    Received from [facility]    Overall Financial Resource Strain (CARDIA)     Difficulty of Paying Living Expenses: Not hard at all   Food Insecurity: No Food Insecurity (11/1/2022)    Received from [facility]    Hunger Vital Sign     Worried About Running Out of Food in the Last Year: Never true     Ran Out of Food in the Last Year: Never true   Transportation Needs: No Transportation Needs (11/1/2022)    Received from [facility]    PRAPARE - Transportation     Lack of Transportation (Medical): No     Lack of Transportation (Non-Medical): No   Physical Activity: Not on file   Stress: Not on file   Social Connections: Not on file   Intimate Partner Violence: Not on file   Housing Stability: Low Risk  (11/1/2022)    Received from [facility]    Housing Stability Vital Sign     Unable to Pay for Housing in the Last Year: No     Number of Places Lived in the Last Year: 1     Unstable Housing in the Last Year: No       Problems, Past medical history, past surgical history, Medications, allergies, social and family history reviewed and as per the electronic medical record from today's encounter    Review of Systems:  Not performed today    Physical Exam:  Vitals: /76   Pulse 83   Ht 1.651 m (5' 5\")   Wt 67.6 kg (149 lb)   LMP  (LMP Unknown)   SpO2 98%   BMI 24.79 kg/m²   General: No apparent distress. Alert, appropriate, oriented x 3. Mood and affect normal. Speaking in full sentences.  HENT: Normocephalic, atraumatic. Hearing intact.  Eyes: Extraocular movements grossly intact. Pupils equal and round.   Neck: Supple, trachea midline.  Lungs: Symmetric respiratory excursion on visual exam, nonlabored breathing.  Extremities: No clubbing, cyanosis, or edema noted in arms or legs.  Skin: No rashes, lesions, alopecia noted on back or extremities. "   Neuro: Alert and appropriate. Using a wheelchair. Gait within normal limits. Bulk and tone within normal limits.    Laboratory Data:  The following laboratory data were reviewed during this visit:   Lab Results   Component Value Date    WBC 4.8 09/20/2024    RBC 4.34 09/20/2024    HGB 14.2 09/20/2024    HCT 42.4 09/20/2024     09/20/2024      Lab Results   Component Value Date    INR 1.30 08/26/2024    INR 1.30 07/22/2024    INR 1.60 07/01/2024     Lab Results   Component Value Date    CREATININE 3.61 (H) 09/20/2024    HGBA1C 9.0 (H) 09/20/2024       Imaging:  The following imaging impressions were reviewed by me during this visit:    -10/20/2020 lumbar spine MRI shows lumbar facet arthropathy in the lower lumbar spine with no significant central or foraminal stenosis     I also personally reviewed the images from the above studies myself. These images and my interpretation of them contributed to the management and decision making of the patient's medical plan.    ASSESSMENT:  Ms. Eli Zavala is a 73 y.o. female with neck, low back, and bilateral foot pain that is consistent with:    1. Myofascial pain        PLAN:    Diagnostics:   - I reviewed her most recent cervical spine CT and lumbar spine MRI. No further diagnostics are indicated at this time.    Physical Therapy and Rehabilitation:     - She has been referred to PT for her neck    Psychologically:  - No needs at this time    Medications  - No changes to medication at this time. Given her medical co-morbidities her medication options are fairly limited    Duration  - Low back pain for multiple years, with neck pain for 2 weeks    Interventions:  - I suspect her neck pain is primarily myofascial in nature, with possible contributions from cervical facet arthropathy. She underwent trigger point injections today, as noted below  - I suspect her low back pain is secondary to lumbar facet arthropathy, with possible contributions from vertebrogenic low  back pain. I recommend diagnostic bilateral lumbar medial branch nerve blocks at L4/5, L5/S1 utilizing live fluoroscopy and local anesthesia, with consideration for RF ablation in the future. Risks, benefits, alternatives discussed. 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 I believe she would be a reasonable candidate for the Scrambler therapy targeting the bilateral L5 and S1 dermatomes in the feet. Of note, she is on dialysis Tuesday, Thursday, and Saturday from 10 am to 3 pm thus will have to schedule around those visits.     Inject Trigger Points, >3    Date/Time: 10/25/2024 1:45 PM    Performed by: Perico Roberts MD  Authorized by: Perico Roberts MD  Preparation: Patient was prepped and draped in the usual sterile fashion.  Local anesthesia used: no    Anesthesia:  Local anesthesia used: no    Sedation:  Patient sedated: no    Comments: Procedure Note: Trigger Point Injections  The correct site and laterality were confirmed with the patient.  Next, I palpated and marked 5 trigger points in the bilateral trapezius, bilateral cervical paraspinal, right occipitalis muscles.  The sites were then cleaned with alcohol and a 1.5 inch 25 gauge needle was used to infiltrate each site with 1 mL of a mixture of 10 mg triamcinolone diluted in 5 mL of bupivacaine 0.5%.  Dry needling was then performed at each site for 5-10 seconds.  The patient tolerated this procedure well and there were no                Sincerely,  Perico Roberts MD  [facility] - [email or web address]

## 2024-10-25 NOTE — PROGRESS NOTES
"[facility] Pain Management  Follow Up Office Visit Note 10/25/2024    Patient Information: Eli Zavala, MRN: [id number], : 1950   Primary Care/Referring Physician: Jese Bonilla MD, [id number] / [place] [id number]     Chief Complaint: Neck pain, low back pain, bilateral foot pain    Interval History: Ms. Zavala presents today for trigger point injections    She denies any changes since last seen in the office. She continues to have bilateral neck and shoulder pain    Brief History of Pain: Ms. Eli Zavala is a 73 y.o. female with a PMHx of HTN, HLD, T2DM (A1c 9.0%), CAD, Afib (on Warfarin), CVA, ESRD (on IHD on Tues, Thurs, Sat)< hx of DVT, GERD, COPD, temporal arteritis who presents for neck and shoulder pain, low back pain, and bilateral foot pain     Currently her worst pain is in the neck/shoulders. She reports onset of pain approximately 2 weeks ago with no obvious inciting event. She describes pain bilaterally in the neck and shoulders, with significant pain with rotation in either direction as well as flexion and extension. Her neck feels very stiff. She denies any numbness, tingling, or weakness in her arms. She is using a heating pad on the neck with minimal relief. She tried lidocaine patches with no benefit    She also reports low back pain which started many years ago. She describes primarily axial low back pain which worsens significantly when she tries to straighten her back after bending forward. She does get some pain relief from lying down flat and sitting. She denies any pain radiating down her legs. She uses a walker at home to get around.     She also reports neuropathy in her feet since chemotherapy around 10 years ago. This is described as a \"stinging\" pain and is significantly worse at night, making it difficult to sleep    Current Pain Medications: OTC Tylenol  Previously Tried Pain Medications:    Relevant Surgeries: Denies lumbar or cervical spine surgery. She had a " "right ankle ORIF due to fracture  Injections: She reports undergoing some type of low back injections in the past (details unclear, ?SI joint injections)  Physical/Occupational Therapy: She was referred to PT for her neck but hasn't started this yet    Medications:   Current Outpatient Medications   Medication Instructions    acetaminophen (TYLENOL) 650 mg, Every 6 hours PRN    atorvastatin (LIPITOR) 40 mg, Daily    azithromycin (ZITHROMAX) 500 mg, Daily    BD AutoShield Duo Pen Needle 30 gauge x 3/16\" needle MAY USE TO INJECT UP TO FOUR TIMES DAILY    butalbital-acetaminophen-caff -40 mg tablet Take by mouth.    calcium carbonate (Tums) 200 mg calcium chewable tablet Every 12 hours PRN    carvedilol (COREG) 25 mg, 2 times daily (morning and late afternoon)    cefpodoxime (VANTIN) 100 mg, oral, 2 times daily    chlorhexidine (Peridex) 0.12 % solution RINSE AND SPIT TWICE A DAY 1/2 OZ FOR 30 SECONDS, START DAY AFTER SX    clobetasol (Temovate) 0.05 % cream Apply to affected vaginal area 2 times a week    clotrimazole (Lotrimin) 1 % cream APPLY TOPICALLY TO AFFECTED AREA 2 TIMES A DAY FOR 7 DAYS    dextrose 15 gram/32 mL gel in packet Take by mouth.    diclofenac (Voltaren) 0.1 % ophthalmic solution 1 DROP IN AFFECTED EYES 4 TIMES A DAY AS NEEDED    fluocinolone (DermOtic) 0.01 % ear drops INSTIL 4 DROPS TO AFFECTED EAR(S) TWICE A DAY FOR 7 DAYS AND AS NEEDED    FreeStyle Ron 2 [place] [facility] USE AS DIRECTED    FreeStyle Ron 2 Sensor kit     insulin glargine (LANTUS) 10 Units, Nightly    insulin lispro (HumaLOG KwikPen Insulin) 100 unit/mL injection Inject under the skin. INJECT ACCORDING TO SLIDING SCALE ( 4 18 UNITS BEFORE MEALS)    isosorbide mononitrate ER (IMDUR) 30 mg, Daily    ketoconazole (NIZOral) 2 % cream 2 times daily    levothyroxine (SYNTHROID) 75 mcg, Daily before breakfast    niacin 50 mg, Daily    nystatin (Mycostatin) cream 1 Application, Topical, 2 times daily    pantoprazole (PROTONIX) 40 " mg, Daily    polyethylene glycol-electrolytes (polyethylene glycol) 420 gram solution As directed for outpatient colonoscopy    prednisoLONE acetate (Pred-Forte) 1 % ophthalmic suspension     Purelax 17 gram/dose powder DISSOLVE 17 GM(S) IN 8 OUNCES OF WATER AND DRINK DAILY    Repatha SureClick 140 mg, Every 14 days    sennosides-docusate sodium (Senokot-S) 8.6-50 mg tablet 1 tablet, Daily RT    sodium bicarbonate 650 mg tablet TAKE 2 TABLETS BY MOUTH EVERY DAY FOR 90 DAYS    Stool Softener 100 mg, 2 times daily    torsemide (Demadex) 100 mg tablet 0.5 tablets, Daily    warfarin (COUMADIN) 2.5 mg    warfarin (COUMADIN) 5 mg      Allergies:   Allergies   Allergen Reactions    Levofloxacin Itching and Unknown    Atorvastatin Other and Unknown    Fentanyl Nausea/vomiting    Heparin (Porcine) Nausea/vomiting    Sulfamethoxazole-Trimethoprim Other and Unknown    Adhesive Tape-Silicones Unknown    Amoxicillin Unknown    Omeprazole Unknown    Thiopental Unknown    Sulfa (Sulfonamide Antibiotics) Itching and Unknown       Past Medical & Surgical History:  Past Medical History:   Diagnosis Date    Atherosclerotic heart disease of native coronary artery without angina pectoris     Coronary heart disease    Chronic kidney disease, stage 3 unspecified (Multi)     Chronic kidney disease, stage III (moderate)    Hyperuricemia without signs of inflammatory arthritis and tophaceous disease     Asymptomatic hyperuricemia    Personal history of diseases of the blood and blood-forming organs and certain disorders involving the immune mechanism     History of anemia    Personal history of Hodgkin lymphoma     History of Hodgkin's lymphoma    Personal history of other diseases of the circulatory system     History of essential hypertension    Personal history of other diseases of the digestive system     History of gastroesophageal reflux (GERD)    Personal history of other diseases of the musculoskeletal system and connective tissue      Personal history of arthritis    Personal history of other endocrine, nutritional and metabolic disease     History of hypothyroidism    Personal history of other endocrine, nutritional and metabolic disease 05/03/2021    History of type 2 diabetes mellitus    Personal history of other endocrine, nutritional and metabolic disease     History of hypercholesterolemia    Personal history of other endocrine, nutritional and metabolic disease     History of thyroid disease    Personal history of other specified conditions     History of shortness of breath    Type 2 diabetes mellitus with other diabetic kidney complication     Type 2 diabetes mellitus with renal manifestations      Past Surgical History:   Procedure Laterality Date    CHOLECYSTECTOMY  03/11/2014    Cholecystectomy Laparoscopic    INVASIVE VASCULAR PROCEDURE Left 9/20/2024    Procedure: Peripheral Fistulagram W/ Declot *labs on admit*;  Surgeon: Brandon Harrison MD;  Location: [facility];  Service: Vascular Surgery;  Laterality: Left;  *labs on admit*    MR HEAD ANGIO WO IV CONTRAST  8/16/2018    MR HEAD ANGIO WO IV CONTRAST [facility]    MR NECK ANGIO WO IV CONTRAST  8/16/2018    MR NECK ANGIO WO IV CONTRAST [facility]    OTHER SURGICAL HISTORY  09/16/2021    Hysterectomy    SINUS SURGERY  08/21/2014    Sinus Surgery    TONSILLECTOMY  08/21/2014    Tonsillectomy       No family history on file.  Social History     Socioeconomic History    Marital status:      Spouse name: Not on file    Number of children: Not on file    Years of education: Not on file    Highest education level: Not on file   Occupational History    Not on file   Tobacco Use    Smoking status: Never     Passive exposure: Never    Smokeless tobacco: Never   Substance and Sexual Activity    Alcohol use: Not Currently    Drug use: Never    Sexual activity: Defer   Other Topics Concern    Not on file   Social History Narrative    Not on file     Social  "Drivers of Health     Financial Resource Strain: Low Risk  (11/1/2022)    Received from [facility]    Overall Financial Resource Strain (CARDIA)     Difficulty of Paying Living Expenses: Not hard at all   Food Insecurity: No Food Insecurity (11/1/2022)    Received from [facility]    Hunger Vital Sign     Worried About Running Out of Food in the Last Year: Never true     Ran Out of Food in the Last Year: Never true   Transportation Needs: No Transportation Needs (11/1/2022)    Received from [facility]    PRAPARE - Transportation     Lack of Transportation (Medical): No     Lack of Transportation (Non-Medical): No   Physical Activity: Not on file   Stress: Not on file   Social Connections: Not on file   Intimate Partner Violence: Not on file   Housing Stability: Low Risk  (11/1/2022)    Received from [facility]    Housing Stability Vital Sign     Unable to Pay for Housing in the Last Year: No     Number of Places Lived in the Last Year: 1     Unstable Housing in the Last Year: No       Problems, Past medical history, past surgical history, Medications, allergies, social and family history reviewed and as per the electronic medical record from today's encounter    Review of Systems:  Not performed today    Physical Exam:  Vitals: /76   Pulse 83   Ht 1.651 m (5' 5\")   Wt 67.6 kg (149 lb)   LMP  (LMP Unknown)   SpO2 98%   BMI 24.79 kg/m²   General: No apparent distress. Alert, appropriate, oriented x 3. Mood and affect normal. Speaking in full sentences.  HENT: Normocephalic, atraumatic. Hearing intact.  Eyes: Extraocular movements grossly intact. Pupils equal and round.   Neck: Supple, trachea midline.  Lungs: Symmetric respiratory excursion on visual exam, nonlabored breathing.  Extremities: No clubbing, cyanosis, or edema noted in arms or legs.  Skin: No rashes, lesions, alopecia noted on back or extremities. "   Neuro: Alert and appropriate. Using a wheelchair. Gait within normal limits. Bulk and tone within normal limits.    Laboratory Data:  The following laboratory data were reviewed during this visit:   Lab Results   Component Value Date    WBC 4.8 09/20/2024    RBC 4.34 09/20/2024    HGB 14.2 09/20/2024    HCT 42.4 09/20/2024     09/20/2024      Lab Results   Component Value Date    INR 1.30 08/26/2024    INR 1.30 07/22/2024    INR 1.60 07/01/2024     Lab Results   Component Value Date    CREATININE 3.61 (H) 09/20/2024    HGBA1C 9.0 (H) 09/20/2024       Imaging:  The following imaging impressions were reviewed by me during this visit:    -10/20/2020 lumbar spine MRI shows lumbar facet arthropathy in the lower lumbar spine with no significant central or foraminal stenosis     I also personally reviewed the images from the above studies myself. These images and my interpretation of them contributed to the management and decision making of the patient's medical plan.    ASSESSMENT:  Ms. Eli Zavala is a 73 y.o. female with neck, low back, and bilateral foot pain that is consistent with:    1. Myofascial pain        PLAN:    Diagnostics:   - I reviewed her most recent cervical spine CT and lumbar spine MRI. No further diagnostics are indicated at this time.    Physical Therapy and Rehabilitation:     - She has been referred to PT for her neck    Psychologically:  - No needs at this time    Medications  - No changes to medication at this time. Given her medical co-morbidities her medication options are fairly limited    Duration  - Low back pain for multiple years, with neck pain for 2 weeks    Interventions:  - I suspect her neck pain is primarily myofascial in nature, with possible contributions from cervical facet arthropathy. She underwent trigger point injections today, as noted below  - I suspect her low back pain is secondary to lumbar facet arthropathy, with possible contributions from vertebrogenic low  back pain. I recommend diagnostic bilateral lumbar medial branch nerve blocks at L4/5, L5/S1 utilizing live fluoroscopy and local anesthesia, with consideration for RF ablation in the future. Risks, benefits, alternatives discussed. 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 I believe she would be a reasonable candidate for the Scrambler therapy targeting the bilateral L5 and S1 dermatomes in the feet. Of note, she is on dialysis Tuesday, Thursday, and Saturday from 10 am to 3 pm thus will have to schedule around those visits.     Inject Trigger Points, >3    Date/Time: 10/25/2024 1:45 PM    Performed by: Perico Roberts MD  Authorized by: Perico Roberts MD  Preparation: Patient was prepped and draped in the usual sterile fashion.  Local anesthesia used: no    Anesthesia:  Local anesthesia used: no    Sedation:  Patient sedated: no    Comments: Procedure Note: Trigger Point Injections  The correct site and laterality were confirmed with the patient.  Next, I palpated and marked 5 trigger points in the bilateral trapezius, bilateral cervical paraspinal, right occipitalis muscles.  The sites were then cleaned with alcohol and a 1.5 inch 25 gauge needle was used to infiltrate each site with 1 mL of a mixture of 10 mg triamcinolone diluted in 5 mL of bupivacaine 0.5%.  Dry needling was then performed at each site for 5-10 seconds.  The patient tolerated this procedure well and there were no                Sincerely,  Perico Roberts MD  [facility] [email or web address]

## 2024-10-25 NOTE — PROGRESS NOTES
Chief Complaint   Patient presents with    UTI     4 DAYS UTI BURNING/ PAIN / ITCHING        Physical Exam:     Abdomen is soft, non-tender, and non-distended. Mild suprapubic tenderness. No CVA tenderness.       POC Labs:     Office Visit on 10/25/2024   Component Date Value Ref Range Status    POC Color, Urine 10/25/2024 Light-Yellow  Straw, Yellow, Light-Yellow Final    POC Appearance, Urine 10/25/2024 Cloudy (A)  Clear Final    POC Glucose, Urine 10/25/2024 100 (1+) (A)  NEGATIVE mg/dl Final    POC Bilirubin, Urine 10/25/2024 SMALL (1+) (A)  NEGATIVE Final    POC Ketones, Urine 10/25/2024 NEGATIVE  NEGATIVE mg/dl Final    POC Specific Gravity, Urine 10/25/2024 1.020  1.005 - 1.035 Final    POC Blood, Urine 10/25/2024 LARGE (3+) (A)  NEGATIVE Final    POC PH, Urine 10/25/2024 6.0  No Reference Range Established PH Final    POC Protein, Urine 10/25/2024 >=300 (3+) (A)  NEGATIVE, 30 (1+) mg/dl Final    POC Urobilinogen, Urine 10/25/2024 0.2  0.2, 1.0 EU/DL Final    Poc Nitrite, Urine 10/25/2024 NEGATIVE  NEGATIVE Final    POC Leukocytes, Urine 10/25/2024 MODERATE (2+) (A)  NEGATIVE Final        Encounter Diagnosis   Name Primary?    Acute cystitis with hematuria Yes        Medical Decision Making & Plan:   Rx: cefpodoxime  Ucx   Alejandrina Flood,

## 2024-10-25 NOTE — TELEPHONE ENCOUNTER
Pt stated she has been having some itching & discomfort for the past two days.  Advised she will most likely need to provide a urine sample at nearest lab

## 2024-10-27 LAB — BACTERIA UR CULT: ABNORMAL

## 2024-10-28 ENCOUNTER — HOSPITAL ENCOUNTER (OUTPATIENT)
Dept: RADIOLOGY | Facility: CLINIC | Age: 74
Discharge: HOME | End: 2024-10-28
Payer: MEDICARE

## 2024-10-28 ENCOUNTER — OFFICE VISIT (OUTPATIENT)
Dept: ORTHOPEDIC SURGERY | Facility: CLINIC | Age: 74
End: 2024-10-28
Payer: MEDICARE

## 2024-10-28 DIAGNOSIS — M54.50 ACUTE LOW BACK PAIN, UNSPECIFIED BACK PAIN LATERALITY, UNSPECIFIED WHETHER SCIATICA PRESENT: ICD-10-CM

## 2024-10-28 DIAGNOSIS — M54.2 NECK PAIN: ICD-10-CM

## 2024-10-28 DIAGNOSIS — S16.1XXA POSTEROLATERAL CERVICAL MUSCLE STRAIN, INITIAL ENCOUNTER: ICD-10-CM

## 2024-10-28 DIAGNOSIS — M54.50 LUMBAR PAIN: Primary | ICD-10-CM

## 2024-10-28 LAB — BACTERIA UR CULT: ABNORMAL

## 2024-10-28 PROCEDURE — 3052F HG A1C>EQUAL 8.0%<EQUAL 9.0%: CPT | Performed by: STUDENT IN AN ORGANIZED HEALTH CARE EDUCATION/TRAINING PROGRAM

## 2024-10-28 PROCEDURE — 1159F MED LIST DOCD IN RCRD: CPT | Performed by: STUDENT IN AN ORGANIZED HEALTH CARE EDUCATION/TRAINING PROGRAM

## 2024-10-28 PROCEDURE — 99213 OFFICE O/P EST LOW 20 MIN: CPT | Performed by: STUDENT IN AN ORGANIZED HEALTH CARE EDUCATION/TRAINING PROGRAM

## 2024-10-28 PROCEDURE — 72040 X-RAY EXAM NECK SPINE 2-3 VW: CPT | Performed by: RADIOLOGY

## 2024-10-28 PROCEDURE — 72040 X-RAY EXAM NECK SPINE 2-3 VW: CPT

## 2024-10-28 PROCEDURE — 1157F ADVNC CARE PLAN IN RCRD: CPT | Performed by: STUDENT IN AN ORGANIZED HEALTH CARE EDUCATION/TRAINING PROGRAM

## 2024-10-28 PROCEDURE — 1125F AMNT PAIN NOTED PAIN PRSNT: CPT | Performed by: STUDENT IN AN ORGANIZED HEALTH CARE EDUCATION/TRAINING PROGRAM

## 2024-10-28 PROCEDURE — 99203 OFFICE O/P NEW LOW 30 MIN: CPT | Performed by: STUDENT IN AN ORGANIZED HEALTH CARE EDUCATION/TRAINING PROGRAM

## 2024-10-28 ASSESSMENT — ENCOUNTER SYMPTOMS
LOSS OF SENSATION IN FEET: 0
OCCASIONAL FEELINGS OF UNSTEADINESS: 0
DEPRESSION: 1

## 2024-10-28 ASSESSMENT — PAIN SCALES - GENERAL: PAINLEVEL_OUTOF10: 8

## 2024-10-28 ASSESSMENT — PAIN - FUNCTIONAL ASSESSMENT: PAIN_FUNCTIONAL_ASSESSMENT: 0-10

## 2024-10-28 ASSESSMENT — PAIN DESCRIPTION - DESCRIPTORS: DESCRIPTORS: SHARP

## 2024-10-30 ENCOUNTER — APPOINTMENT (OUTPATIENT)
Dept: PAIN MEDICINE | Facility: CLINIC | Age: 74
End: 2024-10-30
Payer: MEDICARE

## 2024-10-30 ENCOUNTER — TELEPHONE (OUTPATIENT)
Dept: PAIN MEDICINE | Facility: CLINIC | Age: 74
End: 2024-10-30
Payer: MEDICARE

## 2024-10-30 DIAGNOSIS — M79.18 MYOFASCIAL PAIN: Primary | ICD-10-CM

## 2024-10-30 NOTE — TELEPHONE ENCOUNTER
Pt's friend calls stating patient is having increased neck pain. Patient is aware of the lumbar procedure scheduled for Monday 11/04/2024 but is complaining about neck pain and wants to be seen soon. Can you talk to her about this at her procedure or do you want to see her Friday?

## 2024-10-31 ENCOUNTER — HOSPITAL ENCOUNTER (EMERGENCY)
Facility: HOSPITAL | Age: 74
Discharge: HOME | End: 2024-10-31
Attending: EMERGENCY MEDICINE
Payer: MEDICARE

## 2024-10-31 ENCOUNTER — ANTICOAGULATION - WARFARIN VISIT (OUTPATIENT)
Dept: CARDIOLOGY | Facility: HOSPITAL | Age: 74
End: 2024-10-31
Payer: MEDICARE

## 2024-10-31 VITALS
DIASTOLIC BLOOD PRESSURE: 65 MMHG | RESPIRATION RATE: 16 BRPM | HEIGHT: 65 IN | OXYGEN SATURATION: 98 % | BODY MASS INDEX: 24.99 KG/M2 | SYSTOLIC BLOOD PRESSURE: 140 MMHG | TEMPERATURE: 98.2 F | HEART RATE: 78 BPM | WEIGHT: 150 LBS

## 2024-10-31 DIAGNOSIS — I48.20 CHRONIC ATRIAL FIBRILLATION (MULTI): ICD-10-CM

## 2024-10-31 DIAGNOSIS — G89.29 CHRONIC NECK PAIN: Primary | ICD-10-CM

## 2024-10-31 DIAGNOSIS — M54.2 CHRONIC NECK PAIN: Primary | ICD-10-CM

## 2024-10-31 PROCEDURE — 2500000005 HC RX 250 GENERAL PHARMACY W/O HCPCS

## 2024-10-31 PROCEDURE — 99283 EMERGENCY DEPT VISIT LOW MDM: CPT

## 2024-10-31 PROCEDURE — 2500000001 HC RX 250 WO HCPCS SELF ADMINISTERED DRUGS (ALT 637 FOR MEDICARE OP)

## 2024-10-31 PROCEDURE — 96372 THER/PROPH/DIAG INJ SC/IM: CPT

## 2024-10-31 PROCEDURE — 2500000004 HC RX 250 GENERAL PHARMACY W/ HCPCS (ALT 636 FOR OP/ED)

## 2024-10-31 RX ORDER — METHOCARBAMOL 500 MG/1
1000 TABLET, FILM COATED ORAL ONCE
Status: COMPLETED | OUTPATIENT
Start: 2024-10-31 | End: 2024-10-31

## 2024-10-31 RX ORDER — LIDOCAINE 560 MG/1
1 PATCH PERCUTANEOUS; TOPICAL; TRANSDERMAL ONCE
Status: DISCONTINUED | OUTPATIENT
Start: 2024-10-31 | End: 2024-10-31 | Stop reason: HOSPADM

## 2024-10-31 RX ORDER — METHOCARBAMOL 500 MG/1
500 TABLET, FILM COATED ORAL 2 TIMES DAILY
Qty: 20 TABLET | Refills: 0 | Status: SHIPPED | OUTPATIENT
Start: 2024-10-31 | End: 2024-11-10

## 2024-10-31 RX ORDER — KETOROLAC TROMETHAMINE 30 MG/ML
15 INJECTION, SOLUTION INTRAMUSCULAR; INTRAVENOUS ONCE
Status: DISCONTINUED | OUTPATIENT
Start: 2024-10-31 | End: 2024-10-31 | Stop reason: ALTCHOICE

## 2024-10-31 RX ORDER — METHYLPREDNISOLONE 4 MG/1
TABLET ORAL
Qty: 21 TABLET | Refills: 0 | Status: SHIPPED | OUTPATIENT
Start: 2024-10-31 | End: 2024-11-07

## 2024-10-31 RX ORDER — TIZANIDINE 2 MG/1
2 TABLET ORAL DAILY PRN
Qty: 15 TABLET | Refills: 0 | Status: SHIPPED | OUTPATIENT
Start: 2024-10-31 | End: 2024-11-15

## 2024-10-31 ASSESSMENT — PAIN SCALES - GENERAL: PAINLEVEL_OUTOF10: 0 - NO PAIN

## 2024-10-31 ASSESSMENT — PAIN - FUNCTIONAL ASSESSMENT: PAIN_FUNCTIONAL_ASSESSMENT: 0-10

## 2024-10-31 NOTE — TELEPHONE ENCOUNTER
Called pt. And left her know that a prescription of Tizanidine was called to her pharmacy. Pt. Stated that she will go and pick it up, but that she is going to the Emergency Room because the pain is so intense. RN stated that she would let the physician know.

## 2024-10-31 NOTE — TELEPHONE ENCOUNTER
I just sent a prescription for Tizanidine, a muscle relaxant, to try    Because of her reduced kidney function she should only take this once a day for now. This medication may make her drowsy

## 2024-10-31 NOTE — TELEPHONE ENCOUNTER
Pt. Called stating that she is having increased pain in her neck and it is getting hard for her to move. Please advise.

## 2024-11-01 ENCOUNTER — HOSPITAL ENCOUNTER (OUTPATIENT)
Dept: RADIOLOGY | Facility: CLINIC | Age: 74
Discharge: HOME | End: 2024-11-01
Payer: MEDICARE

## 2024-11-01 DIAGNOSIS — M54.50 LUMBAR PAIN: ICD-10-CM

## 2024-11-01 PROCEDURE — 72110 X-RAY EXAM L-2 SPINE 4/>VWS: CPT

## 2024-11-04 ENCOUNTER — HOSPITAL ENCOUNTER (OUTPATIENT)
Dept: GASTROENTEROLOGY | Facility: HOSPITAL | Age: 74
Discharge: HOME | End: 2024-11-04
Payer: MEDICARE

## 2024-11-04 ENCOUNTER — PREP FOR PROCEDURE (OUTPATIENT)
Dept: PAIN MEDICINE | Facility: CLINIC | Age: 74
End: 2024-11-04

## 2024-11-04 VITALS
RESPIRATION RATE: 18 BRPM | HEIGHT: 65 IN | OXYGEN SATURATION: 99 % | BODY MASS INDEX: 24.99 KG/M2 | TEMPERATURE: 96.8 F | HEART RATE: 77 BPM | DIASTOLIC BLOOD PRESSURE: 67 MMHG | WEIGHT: 150 LBS | SYSTOLIC BLOOD PRESSURE: 180 MMHG

## 2024-11-04 DIAGNOSIS — M79.18 MYOFASCIAL PAIN: Primary | ICD-10-CM

## 2024-11-04 DIAGNOSIS — M47.816 LUMBAR FACET ARTHROPATHY: ICD-10-CM

## 2024-11-04 LAB
INR PPP: 1.4 (ref 0.9–1.2)
PROTHROMBIN TIME: 13.8 SECONDS (ref 9.3–12.7)

## 2024-11-04 PROCEDURE — 85610 PROTHROMBIN TIME: CPT | Performed by: STUDENT IN AN ORGANIZED HEALTH CARE EDUCATION/TRAINING PROGRAM

## 2024-11-04 PROCEDURE — 64494 INJ PARAVERT F JNT L/S 2 LEV: CPT | Performed by: STUDENT IN AN ORGANIZED HEALTH CARE EDUCATION/TRAINING PROGRAM

## 2024-11-04 PROCEDURE — 36415 COLL VENOUS BLD VENIPUNCTURE: CPT | Performed by: STUDENT IN AN ORGANIZED HEALTH CARE EDUCATION/TRAINING PROGRAM

## 2024-11-04 PROCEDURE — 64493 INJ PARAVERT F JNT L/S 1 LEV: CPT | Mod: 50 | Performed by: STUDENT IN AN ORGANIZED HEALTH CARE EDUCATION/TRAINING PROGRAM

## 2024-11-04 PROCEDURE — 2500000004 HC RX 250 GENERAL PHARMACY W/ HCPCS (ALT 636 FOR OP/ED): Performed by: STUDENT IN AN ORGANIZED HEALTH CARE EDUCATION/TRAINING PROGRAM

## 2024-11-04 RX ORDER — METHOCARBAMOL 500 MG/1
500 TABLET, FILM COATED ORAL 2 TIMES DAILY PRN
Qty: 30 TABLET | Refills: 0 | Status: SHIPPED | OUTPATIENT
Start: 2024-11-04 | End: 2024-11-19

## 2024-11-04 RX ORDER — LIDOCAINE HYDROCHLORIDE 20 MG/ML
INJECTION, SOLUTION EPIDURAL; INFILTRATION; INTRACAUDAL; PERINEURAL AS NEEDED
Status: COMPLETED | OUTPATIENT
Start: 2024-11-04 | End: 2024-11-04

## 2024-11-04 ASSESSMENT — PAIN SCALES - GENERAL
PAINLEVEL_OUTOF10: 0 - NO PAIN
PAINLEVEL_OUTOF10: 5 - MODERATE PAIN

## 2024-11-04 ASSESSMENT — ENCOUNTER SYMPTOMS
DEPRESSION: 0
OCCASIONAL FEELINGS OF UNSTEADINESS: 1

## 2024-11-04 ASSESSMENT — PAIN DESCRIPTION - DESCRIPTORS: DESCRIPTORS: SHARP;ACHING

## 2024-11-04 ASSESSMENT — PAIN - FUNCTIONAL ASSESSMENT
PAIN_FUNCTIONAL_ASSESSMENT: 0-10
PAIN_FUNCTIONAL_ASSESSMENT: 0-10

## 2024-11-04 NOTE — DISCHARGE INSTRUCTIONS
DISCHARGE INSTRUCTIONS FOR INJECTIONS     You underwent diagnostic bilateral lumbar medial branch nerve blocks at L4/5, L5/S1 today    Aftermost injections, it is recommended that you relax and limit your activity for the remainder of the day unless you have been told otherwise by your pain physician.  You should not drive a car, operate machinery, or make important legal decisions unless otherwise directed by your pain physician.  You may resume your normal activity, including exercise, tomorrow.      Keep a written pain diary of how much pain relief you experienced following the injection procedure and the length of time of pain relief you experienced pain relief. Following diagnostic injections like medial branch nerve blocks, sacroiliac joint blocks, stellate ganglion injections and other blocks, it is very important you record the specific amount of pain relief you experienced immediately after the injectionand how long it lasted. Your doctor will ask you for this information at your follow up visit.     For all injections, please keep the injection site dry and inspect the site for a couple of days. You may remove the Band-Aid the day of the injection at any time.     Some discomfort, bruising or slight swelling may occur at the injection site. This is not abnormal if it occurs.  If needed you may:    -Take over the counter medication such as Tylenol or Motrin.   -Apply an ice pack for 30 minutes, 2 to 3 times a day for the first 24 hours.     You may shower today; no soaking baths, hot tubs, whirlpools or swimming pools for two days.      If you are given steroids in your injection, it may take 3-5 days for the steroid medication to take effect. You may notice a worsening of your symptoms for 1-2 days after the injection. This is not abnormal.  You may use acetaminophen, ibuprofen, or prescription medication that your doctor may have prescribed for you if you need to do so.     A few common side effects of  steroids include facial flushing, sweating, restlessness, irritability,difficulty sleeping, increase in blood sugar, and increased blood pressure. If you have diabetes, please monitor your blood sugar at least once a day for at least 5 days. If you have poorly controlled high blood pressure, monitoryour blood pressure for at least 2 days and contact your primary care physician if these numbers are unusually high for you.      If you take aspirin or non-steroidal anti-inflammatory drugs (examples are Motrin, Advil, ibuprofen, Naprosyn, Voltaren, Relafen, etc.) you may restart these this evening, but stop taking it 3 days before your next appointment, unless instructed otherwiseby your physician.      You do not need to discontinue non-aspirin-containing pain medications prior to an injection (examples: Celebrex, tramadol, hydrocodone and acetaminophen).      If you take a blood thinning medication (Coumadin, Lovenox, Fragmin,Ticlid, Plavix, Pradaxa, etc.), please discuss this with your primary care physician/cardiologist and your pain physician. These medications MUST be discontinued before you can have an injection safely, without the risk of uncontrolled bleeding. If these medications are not discontinued for an appropriate period of time, you will not be able to receivean injection.      If you are taking Coumadin, please have your INR checked the morning of your procedure and bringthe result to your appointment unless otherwise instructed. If your INR is over 1.2, your injection may need to be rescheduled to avoid uncontrolled bleeding from the needle placement.     Call [facility] at [phone] between 8am-4pm Monday - Friday if you are experiencing the following:    If you received an epidural or spinal injection:    -Headache that doesnot go away with medicine, is worse when sitting or standing up, and is greatly relieved upon lying down.   -Severe pain worse than or different than your  baseline pain.   -Chills or fever (101º F or greater).   -Drainage or signs of infection at the injection site     Go directly to the Emergency Department if you are experiencing the following and received an epidural or spinal injection:   -Abrupt weakness or progressive weakness in your legs that starts after you leave the clinic.   -Abrupt severe or worsening numbness in your legs.   -Inability to urinate after the injection or loss of bowel or bladder control without the urge to defecate or urinate.     If you have a clinical question that cannot wait until your next appointment, please call [phone] between 8am-4pm Monday - Friday or send a Pikh[employer] message. We do our best to return all non-emergency messages within 24 hours, Monday - Friday. A nurse or physician will return your message.      If you need to cancel an appointment, please call the scheduling staff at [phone] during normal business hours or leave a message at least 24 hours in advance.     If you are going to be sedated for your next procedure, you MUST have responsible adult who can legally drive accompany you home. You cannot eat or drink for eight hours prior to the planned procedure if you are going to receive sedation. You may take your non-blood thinning medications with a small sip of water.

## 2024-11-04 NOTE — POST-PROCEDURE NOTE
Band aid dry and intact. No neuro deficits. Ready for discharge.  Juice and cookie enjoyed. Home with her friend.

## 2024-11-04 NOTE — PRE-PROCEDURE NOTE
INR bloodwork to be collected. . No strips for bedside monitor-ordered by [facility]. Whole blood sample difficult because of dialysis fistula. Access  nurse requested.

## 2024-11-06 ENCOUNTER — OFFICE VISIT (OUTPATIENT)
Dept: URGENT CARE | Age: 74
End: 2024-11-06
Payer: MEDICARE

## 2024-11-06 ENCOUNTER — OFFICE VISIT (OUTPATIENT)
Dept: PAIN MEDICINE | Facility: CLINIC | Age: 74
End: 2024-11-06
Payer: MEDICARE

## 2024-11-06 VITALS
BODY MASS INDEX: 24.99 KG/M2 | DIASTOLIC BLOOD PRESSURE: 62 MMHG | HEIGHT: 65 IN | SYSTOLIC BLOOD PRESSURE: 122 MMHG | RESPIRATION RATE: 20 BRPM | OXYGEN SATURATION: 99 % | WEIGHT: 150 LBS | HEART RATE: 76 BPM

## 2024-11-06 VITALS
RESPIRATION RATE: 18 BRPM | SYSTOLIC BLOOD PRESSURE: 122 MMHG | TEMPERATURE: 98.9 F | DIASTOLIC BLOOD PRESSURE: 56 MMHG | OXYGEN SATURATION: 98 % | HEART RATE: 80 BPM

## 2024-11-06 DIAGNOSIS — B37.2 YEAST DERMATITIS: ICD-10-CM

## 2024-11-06 DIAGNOSIS — B37.31 CANDIDIASIS OF FEMALE GENITALIA: ICD-10-CM

## 2024-11-06 DIAGNOSIS — M47.816 LUMBAR FACET ARTHROPATHY: ICD-10-CM

## 2024-11-06 DIAGNOSIS — M47.812 ARTHROPATHY OF CERVICAL FACET JOINT: Primary | ICD-10-CM

## 2024-11-06 DIAGNOSIS — N30.01 ACUTE CYSTITIS WITH HEMATURIA: Primary | ICD-10-CM

## 2024-11-06 DIAGNOSIS — M79.18 MYOFASCIAL PAIN: ICD-10-CM

## 2024-11-06 DIAGNOSIS — M54.2 CERVICALGIA: ICD-10-CM

## 2024-11-06 LAB
POC APPEARANCE, URINE: ABNORMAL
POC BILIRUBIN, URINE: ABNORMAL
POC BLOOD, URINE: ABNORMAL
POC COLOR, URINE: ABNORMAL
POC GLUCOSE, URINE: ABNORMAL MG/DL
POC KETONES, URINE: NEGATIVE MG/DL
POC LEUKOCYTES, URINE: NEGATIVE
POC NITRITE,URINE: NEGATIVE
POC PH, URINE: 5.5 PH
POC PROTEIN, URINE: ABNORMAL MG/DL
POC SPECIFIC GRAVITY, URINE: 1.02
POC UROBILINOGEN, URINE: 0.2 EU/DL

## 2024-11-06 PROCEDURE — 99214 OFFICE O/P EST MOD 30 MIN: CPT | Performed by: STUDENT IN AN ORGANIZED HEALTH CARE EDUCATION/TRAINING PROGRAM

## 2024-11-06 PROCEDURE — 1157F ADVNC CARE PLAN IN RCRD: CPT | Performed by: SURGERY

## 2024-11-06 PROCEDURE — 3052F HG A1C>EQUAL 8.0%<EQUAL 9.0%: CPT | Performed by: SURGERY

## 2024-11-06 PROCEDURE — 1159F MED LIST DOCD IN RCRD: CPT | Performed by: STUDENT IN AN ORGANIZED HEALTH CARE EDUCATION/TRAINING PROGRAM

## 2024-11-06 PROCEDURE — 1036F TOBACCO NON-USER: CPT | Performed by: STUDENT IN AN ORGANIZED HEALTH CARE EDUCATION/TRAINING PROGRAM

## 2024-11-06 PROCEDURE — 1160F RVW MEDS BY RX/DR IN RCRD: CPT | Performed by: SURGERY

## 2024-11-06 PROCEDURE — 1160F RVW MEDS BY RX/DR IN RCRD: CPT | Performed by: STUDENT IN AN ORGANIZED HEALTH CARE EDUCATION/TRAINING PROGRAM

## 2024-11-06 PROCEDURE — 99213 OFFICE O/P EST LOW 20 MIN: CPT | Performed by: SURGERY

## 2024-11-06 PROCEDURE — 2500000004 HC RX 250 GENERAL PHARMACY W/ HCPCS (ALT 636 FOR OP/ED): Performed by: STUDENT IN AN ORGANIZED HEALTH CARE EDUCATION/TRAINING PROGRAM

## 2024-11-06 PROCEDURE — 1157F ADVNC CARE PLAN IN RCRD: CPT | Performed by: STUDENT IN AN ORGANIZED HEALTH CARE EDUCATION/TRAINING PROGRAM

## 2024-11-06 PROCEDURE — 3078F DIAST BP <80 MM HG: CPT | Performed by: SURGERY

## 2024-11-06 PROCEDURE — 3008F BODY MASS INDEX DOCD: CPT | Performed by: STUDENT IN AN ORGANIZED HEALTH CARE EDUCATION/TRAINING PROGRAM

## 2024-11-06 PROCEDURE — 87086 URINE CULTURE/COLONY COUNT: CPT

## 2024-11-06 PROCEDURE — 20552 NJX 1/MLT TRIGGER POINT 1/2: CPT | Performed by: STUDENT IN AN ORGANIZED HEALTH CARE EDUCATION/TRAINING PROGRAM

## 2024-11-06 PROCEDURE — 99214 OFFICE O/P EST MOD 30 MIN: CPT | Mod: 25 | Performed by: STUDENT IN AN ORGANIZED HEALTH CARE EDUCATION/TRAINING PROGRAM

## 2024-11-06 PROCEDURE — 3052F HG A1C>EQUAL 8.0%<EQUAL 9.0%: CPT | Performed by: STUDENT IN AN ORGANIZED HEALTH CARE EDUCATION/TRAINING PROGRAM

## 2024-11-06 PROCEDURE — 81003 URINALYSIS AUTO W/O SCOPE: CPT | Performed by: SURGERY

## 2024-11-06 PROCEDURE — 3078F DIAST BP <80 MM HG: CPT | Performed by: STUDENT IN AN ORGANIZED HEALTH CARE EDUCATION/TRAINING PROGRAM

## 2024-11-06 PROCEDURE — 1159F MED LIST DOCD IN RCRD: CPT | Performed by: SURGERY

## 2024-11-06 PROCEDURE — 3074F SYST BP LT 130 MM HG: CPT | Performed by: SURGERY

## 2024-11-06 PROCEDURE — 3074F SYST BP LT 130 MM HG: CPT | Performed by: STUDENT IN AN ORGANIZED HEALTH CARE EDUCATION/TRAINING PROGRAM

## 2024-11-06 PROCEDURE — 1125F AMNT PAIN NOTED PAIN PRSNT: CPT | Performed by: STUDENT IN AN ORGANIZED HEALTH CARE EDUCATION/TRAINING PROGRAM

## 2024-11-06 PROCEDURE — 1036F TOBACCO NON-USER: CPT | Performed by: SURGERY

## 2024-11-06 PROCEDURE — 96372 THER/PROPH/DIAG INJ SC/IM: CPT | Performed by: STUDENT IN AN ORGANIZED HEALTH CARE EDUCATION/TRAINING PROGRAM

## 2024-11-06 RX ORDER — BUPIVACAINE HYDROCHLORIDE 5 MG/ML
5 INJECTION, SOLUTION EPIDURAL; INTRACAUDAL ONCE
Status: COMPLETED | OUTPATIENT
Start: 2024-11-06 | End: 2024-11-06

## 2024-11-06 RX ORDER — MICONAZOLE NITRATE 2 %
1 CREAM WITH APPLICATOR VAGINAL NIGHTLY
Qty: 1 G | Refills: 0 | Status: SHIPPED | OUTPATIENT
Start: 2024-11-06 | End: 2024-11-13

## 2024-11-06 RX ORDER — TRIAMCINOLONE ACETONIDE 40 MG/ML
10 INJECTION, SUSPENSION INTRA-ARTICULAR; INTRAMUSCULAR ONCE
Status: COMPLETED | OUTPATIENT
Start: 2024-11-06 | End: 2024-11-06

## 2024-11-06 RX ORDER — NYSTATIN 100000 [USP'U]/G
POWDER TOPICAL
Qty: 60 G | Refills: 0 | Status: SHIPPED | OUTPATIENT
Start: 2024-11-06 | End: 2025-11-06

## 2024-11-06 RX ORDER — CEFPODOXIME PROXETIL 100 MG/1
100 TABLET, FILM COATED ORAL 2 TIMES DAILY
Qty: 20 TABLET | Refills: 0 | Status: SHIPPED | OUTPATIENT
Start: 2024-11-06 | End: 2024-11-16

## 2024-11-06 ASSESSMENT — PAIN - FUNCTIONAL ASSESSMENT: PAIN_FUNCTIONAL_ASSESSMENT: 0-10

## 2024-11-06 ASSESSMENT — PAIN SCALES - GENERAL
PAINLEVEL_OUTOF10: 8
PAINLEVEL_OUTOF10: 8

## 2024-11-06 ASSESSMENT — PAIN DESCRIPTION - DESCRIPTORS: DESCRIPTORS: SHARP

## 2024-11-06 NOTE — PROGRESS NOTES
Chief Complaint   Patient presents with    UTI     UTI SX  2 DAYS        Physical Exam:     Abdomen is soft, non-tender, and non-distended. Mild suprapubic tenderness. No CVA tenderness.           Encounter Diagnoses   Name Primary?    Candidiasis of female genitalia     Acute cystitis with hematuria Yes    Yeast dermatitis     Frequent urination         Medical Decision Making & Plan:   Cepodoxime  Miconazole  Nystatin powder  Alejandrina Flood, DO

## 2024-11-06 NOTE — PROGRESS NOTES
[facility] Pain Management  Follow Up Office Visit Note 2024    Patient Information: Eli Zavala, MRN: [id number], : 1950   Primary Care/Referring Physician: Jese Bonilla MD, [id number] / [place] [id number]     Chief Complaint: Neck pain, low back pain, bilateral foot pain    Interval History: Ms. Zavala presents today for follow up after bilateral L4/5, L5/S1 mbb's. She also had trigger point injections in her neck at her prior office visit    Today she reports 80% improvement in her low back pain from the procedure. She is interested in proceeding with her 2nd diagnostic procedure    Regarding the TPI's, she states the injections resolved her left trapezius pain but she continues to have some pain in her right trapezius and neck. She would like to have additional TPI's performed today. She tried Tizanidine and Methocarbamol but both lowered her blood pressure during dialysis so she stopped using them.     Brief History of Pain: Ms. Eli Zavala is a 73 y.o. female with a PMHx of HTN, HLD, T2DM (A1c 9.0%), CAD, Afib (on Warfarin), CVA, ESRD (on IHD on Tues, Thurs, Sat)< hx of DVT, GERD, COPD, temporal arteritis who presents for neck and shoulder pain, low back pain, and bilateral foot pain     Currently her worst pain is in the neck/shoulders. She reports onset of pain approximately 2 weeks ago with no obvious inciting event. She describes pain bilaterally in the neck and shoulders, with significant pain with rotation in either direction as well as flexion and extension. Her neck feels very stiff. She denies any numbness, tingling, or weakness in her arms. She is using a heating pad on the neck with minimal relief. She tried lidocaine patches with no benefit    She also reports low back pain which started many years ago. She describes primarily axial low back pain which worsens significantly when she tries to straighten her back after bending forward. She does get some pain relief from  "lying down flat and sitting. She denies any pain radiating down her legs. She uses a walker at home to get around.     She also reports neuropathy in her feet since chemotherapy around 10 years ago. This is described as a \"stinging\" pain and is significantly worse at night, making it difficult to sleep    Current Pain Medications: OTC Tylenol  Previously Tried Pain Medications: Tizanidine - lowered her BP, Methocarbamol - lowered her BP    Relevant Surgeries: Denies lumbar or cervical spine surgery. She had a right ankle ORIF due to fracture  Injections: She reports undergoing some type of low back injections in the past (details unclear, ?SI joint injections)  Physical/Occupational Therapy: She was referred to PT for her neck but hasn't started this yet    Medications:   Current Outpatient Medications   Medication Instructions   • acetaminophen (TYLENOL) 650 mg, Every 6 hours PRN   • atorvastatin (LIPITOR) 40 mg, Daily   • BD AutoShield Duo Pen Needle 30 gauge x 3/16\" needle MAY USE TO INJECT UP TO FOUR TIMES DAILY   • butalbital-acetaminophen-caff -40 mg tablet Take by mouth.   • calcium carbonate (Tums) 200 mg calcium chewable tablet Every 12 hours PRN   • carvedilol (COREG) 25 mg, 2 times daily (morning and late afternoon)   • cefpodoxime (VANTIN) 100 mg, oral, 2 times daily   • chlorhexidine (Peridex) 0.12 % solution RINSE AND SPIT TWICE A DAY 1/2 OZ FOR 30 SECONDS, START DAY AFTER SX   • clobetasol (Temovate) 0.05 % cream Apply to affected vaginal area 2 times a week   • clotrimazole (Lotrimin) 1 % cream APPLY TOPICALLY TO AFFECTED AREA 2 TIMES A DAY FOR 7 DAYS   • dextrose 15 gram/32 mL gel in packet Take by mouth.   • diclofenac (Voltaren) 0.1 % ophthalmic solution 1 DROP IN AFFECTED EYES 4 TIMES A DAY AS NEEDED   • fluocinolone (DermOtic) 0.01 % ear drops INSTIL 4 DROPS TO AFFECTED EAR(S) TWICE A DAY FOR 7 DAYS AND AS NEEDED   • FreeStyle Ron 2 [place] [facility] USE AS DIRECTED   • FreeStyle Ron 2 " Sensor kit    • insulin glargine (LANTUS) 10 Units, Nightly   • insulin lispro (HumaLOG KwikPen Insulin) 100 unit/mL injection Inject under the skin. INJECT ACCORDING TO SLIDING SCALE ( 4 18 UNITS BEFORE MEALS)   • isosorbide mononitrate ER (IMDUR) 30 mg, Daily   • ketoconazole (NIZOral) 2 % cream 2 times daily   • levothyroxine (SYNTHROID) 75 mcg, Daily before breakfast   • methocarbamol (ROBAXIN) 500 mg, oral, 2 times daily PRN   • miconazole (Micotin) 2 % vaginal cream 1 Application, vaginal, Nightly   • niacin 50 mg, Daily   • nystatin (Mycostatin) 100,000 unit/gram powder Apply to affected area 3 times daily   • nystatin (Mycostatin) cream 1 Application, Topical, 2 times daily   • pantoprazole (PROTONIX) 40 mg, Daily   • polyethylene glycol-electrolytes (polyethylene glycol) 420 gram solution    • prednisoLONE acetate (Pred-Forte) 1 % ophthalmic suspension    • Purelax 17 gram/dose powder DISSOLVE 17 GM(S) IN 8 OUNCES OF WATER AND DRINK DAILY   • Repatha SureClick 140 mg, Every 14 days   • sennosides-docusate sodium (Senokot-S) 8.6-50 mg tablet 1 tablet, Daily RT   • sodium bicarbonate 650 mg tablet TAKE 2 TABLETS BY MOUTH EVERY DAY FOR 90 DAYS   • Stool Softener 100 mg, 2 times daily   • tiZANidine (ZANAFLEX) 2 mg, oral, Daily PRN   • warfarin (COUMADIN) 2.5 mg   • warfarin (COUMADIN) 5 mg      Allergies:   Allergies   Allergen Reactions   • Levofloxacin Itching and Unknown   • Atorvastatin Other and Unknown   • Fentanyl Nausea/vomiting   • Heparin (Porcine) Nausea/vomiting   • Sulfamethoxazole-Trimethoprim Other and Unknown   • Adhesive Tape-Silicones Unknown   • Amoxicillin Unknown   • Omeprazole Unknown   • Thiopental Unknown   • Sulfa (Sulfonamide Antibiotics) Itching and Unknown       Past Medical & Surgical History:  Past Medical History:   Diagnosis Date   • Atherosclerotic heart disease of native coronary artery without angina pectoris     Coronary heart disease   • Chronic kidney disease, stage 3  unspecified (Multi)     Chronic kidney disease, stage III (moderate)   • Hyperuricemia without signs of inflammatory arthritis and tophaceous disease     Asymptomatic hyperuricemia   • Personal history of diseases of the blood and blood-forming organs and certain disorders involving the immune mechanism     History of anemia   • Personal history of Hodgkin lymphoma     History of Hodgkin's lymphoma   • Personal history of other diseases of the circulatory system     History of essential hypertension   • Personal history of other diseases of the digestive system     History of gastroesophageal reflux (GERD)   • Personal history of other diseases of the musculoskeletal system and connective tissue     Personal history of arthritis   • Personal history of other endocrine, nutritional and metabolic disease     History of hypothyroidism   • Personal history of other endocrine, nutritional and metabolic disease 05/03/2021    History of type 2 diabetes mellitus   • Personal history of other endocrine, nutritional and metabolic disease     History of hypercholesterolemia   • Personal history of other endocrine, nutritional and metabolic disease     History of thyroid disease   • Personal history of other specified conditions     History of shortness of breath   • Type 2 diabetes mellitus with other diabetic kidney complication     Type 2 diabetes mellitus with renal manifestations      Past Surgical History:   Procedure Laterality Date   • CHOLECYSTECTOMY  03/11/2014    Cholecystectomy Laparoscopic   • INVASIVE VASCULAR PROCEDURE Left 9/20/2024    Procedure: Peripheral Fistulagram W/ Declot *labs on admit*;  Surgeon: Brandon Harrison MD;  Location: [facility];  Service: Vascular Surgery;  Laterality: Left;  *labs on admit*   • MR HEAD ANGIO WO IV CONTRAST  8/16/2018    MR HEAD ANGIO WO IV CONTRAST [facility]   • MR NECK ANGIO WO IV CONTRAST  8/16/2018    MR NECK ANGIO WO IV CONTRAST [facility]    • OTHER SURGICAL HISTORY  09/16/2021    Hysterectomy   • SINUS SURGERY  08/21/2014    Sinus Surgery   • TONSILLECTOMY  08/21/2014    Tonsillectomy       No family history on file.  Social History     Socioeconomic History   • Marital status:      Spouse name: Not on file   • Number of children: Not on file   • Years of education: Not on file   • Highest education level: Not on file   Occupational History   • Not on file   Tobacco Use   • Smoking status: Never     Passive exposure: Never   • Smokeless tobacco: Never   Vaping Use   • Vaping status: Not on file   Substance and Sexual Activity   • Alcohol use: Not Currently   • Drug use: Never   • Sexual activity: Defer   Other Topics Concern   • Not on file   Social History Narrative   • Not on file     Social Drivers of Health     Financial Resource Strain: Low Risk  (11/1/2022)    Received from [facility]    Overall Financial Resource Strain (CARDIA)    • Difficulty of Paying Living Expenses: Not hard at all   Food Insecurity: No Food Insecurity (11/1/2022)    Received from [facility]    Hunger Vital Sign    • Worried About Running Out of Food in the Last Year: Never true    • Ran Out of Food in the Last Year: Never true   Transportation Needs: No Transportation Needs (11/1/2022)    Received from [facility]    PRAPARE - Transportation    • Lack of Transportation (Medical): No    • Lack of Transportation (Non-Medical): No   Physical Activity: Not on file   Stress: Not on file   Social Connections: Not on file   Intimate Partner Violence: Not on file   Housing Stability: Low Risk  (11/1/2022)    Received from [facility]    Housing Stability Vital Sign    • Unable to Pay for Housing in the Last Year: No    • Number of Places Lived in the Last Year: 1    • Unstable Housing in the Last Year: No       Problems, Past medical history, past surgical history, Medications,  "allergies, social and family history reviewed and as per the electronic medical record from today's encounter    Review of Systems:  CONST: No fever, chills, fatigue, weight changes  EYES: No loss of vision  ENT: No hearing loss, tinnitus  CV: No chest pain, palpitations  RESP: No dyspnea, shortness of breath, cough  GI: No stool incontinence, nausea, vomiting  : No urinary incontinence  MSK: No joint swelling  SKIN: No rash, no hives  NEURO: No headache, dizziness  PSYCH: No anxiety, depression  HEM/LYMPH: Reports easy bruising    Physical Exam:  Vitals: /62   Pulse 76   Resp 20   Ht 1.651 m (5' 5\")   Wt 68 kg (150 lb)   LMP  (LMP Unknown)   SpO2 99%   BMI 24.96 kg/m²   General: No apparent distress. Alert, appropriate, oriented x 3. Mood and affect normal. Speaking in full sentences.  HENT: Normocephalic, atraumatic. Hearing intact.  Eyes: Extraocular movements grossly intact. Pupils equal and round.   Neck: Supple, trachea midline.  Lungs: Symmetric respiratory excursion on visual exam, nonlabored breathing.  Extremities: No clubbing, cyanosis, or edema noted in arms or legs.  Skin: No rashes, lesions, alopecia noted on back or extremities.   Neck: Reports pain to palpation of right trapezius and right cervical paraspinal muscles  Neuro: Alert and appropriate. Using a wheelchair. Bulk and tone within normal limits.    Laboratory Data:  The following laboratory data were reviewed during this visit:   Lab Results   Component Value Date    WBC 4.8 09/20/2024    RBC 4.34 09/20/2024    HGB 14.2 09/20/2024    HCT 42.4 09/20/2024     09/20/2024      Lab Results   Component Value Date    INR 1.4 (H) 11/04/2024    INR 1.30 08/26/2024    INR 1.30 07/22/2024     Lab Results   Component Value Date    CREATININE 3.61 (H) 09/20/2024    HGBA1C 9.0 (H) 09/20/2024       Imaging:  The following imaging impressions were reviewed by me during this visit:    -10/20/2020 lumbar spine MRI shows lumbar facet " arthropathy in the lower lumbar spine with no significant central or foraminal stenosis     I also personally reviewed the images from the above studies myself. These images and my interpretation of them contributed to the management and decision making of the patient's medical plan.    ASSESSMENT:  Ms. Eli Zavala is a 73 y.o. female with neck, low back, and bilateral foot pain that is consistent with:    1. Arthropathy of cervical facet joint    2. Cervicalgia    3. Myofascial pain    4. Lumbar facet arthropathy          PLAN:    Diagnostics:   - I reviewed her most recent cervical spine CT and lumbar spine MRI. No further diagnostics are indicated at this time.    Physical Therapy and Rehabilitation:     - She has been referred to PT for her neck    Psychologically:  - No needs at this time    Medications  - No changes to medication at this time. Given her medical co-morbidities her medication options are fairly limited    Duration  - Low back pain for multiple years, with neck pain for 2 weeks    Interventions:  - I suspect her neck pain is primarily myofascial in nature, with possible contributions from cervical facet arthropathy. She underwent additional trigger point injections today, as noted below  - I suspect her low back pain is secondary to lumbar facet arthropathy, with possible contributions from vertebrogenic low back pain. She underwent diagnostic bilateral lumbar medial branch nerve blocks at L4/5, L5/S1 with 80% pain relief. Will plan to proceed with her 2nd diagnostic procedure utilizing live fluoroscopy and local anesthesia, with consideration for RF ablation in the future. Risks, benefits, alternatives discussed. 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 I believe she would be a reasonable candidate for the Scrambler therapy targeting the bilateral L5 and S1 dermatomes in the feet. Of note, she  is on dialysis Tuesday, Thursday, and Saturday from 10 am to 3 pm thus will have to schedule around those visits.     Inject Trigger Point, 1 or 2    Date/Time: 11/6/2024 11:15 AM    Performed by: Perico Roberts MD  Authorized by: Perico Roberts MD  Preparation: Patient was prepped and draped in the usual sterile fashion.  Local anesthesia used: no    Anesthesia:  Local anesthesia used: no    Sedation:  Patient sedated: no    Comments: Procedure Note: Trigger Point Injections  The correct site and laterality were confirmed with the patient.  Next, I palpated and marked 5 trigger points in the right cervical paraspinal and trapezius muscles.  The sites were then cleaned with alcohol and a 1.5 inch 25 gauge needle was used to infiltrate each site with 1 mL of a mixture of 10 mg triamcinolone diluted in 5 mL of bupivacaine 0.5%.  Dry needling was then performed at each site for 5-10 seconds.  The patient tolerated this procedure well and there were no immediate complications              Sincerely,  Perico Roberts MD  [id number]

## 2024-11-07 ENCOUNTER — ANTICOAGULATION - WARFARIN VISIT (OUTPATIENT)
Dept: CARDIOLOGY | Facility: HOSPITAL | Age: 74
End: 2024-11-07
Payer: MEDICARE

## 2024-11-07 DIAGNOSIS — M79.18 MYOFASCIAL PAIN: ICD-10-CM

## 2024-11-07 DIAGNOSIS — I48.20 CHRONIC ATRIAL FIBRILLATION (MULTI): ICD-10-CM

## 2024-11-07 RX ORDER — TIZANIDINE 2 MG/1
2 TABLET ORAL DAILY PRN
Qty: 90 TABLET | Refills: 1 | OUTPATIENT
Start: 2024-11-07 | End: 2024-11-22

## 2024-11-08 LAB — BACTERIA UR CULT: NORMAL

## 2024-11-11 ENCOUNTER — TELEPHONE (OUTPATIENT)
Dept: PAIN MEDICINE | Facility: CLINIC | Age: 74
End: 2024-11-11

## 2024-11-14 ENCOUNTER — HOSPITAL ENCOUNTER (EMERGENCY)
Facility: HOSPITAL | Age: 74
Discharge: HOME | End: 2024-11-14
Payer: MEDICARE

## 2024-11-14 ENCOUNTER — TELEPHONE (OUTPATIENT)
Dept: VASCULAR SURGERY | Facility: CLINIC | Age: 74
End: 2024-11-14
Payer: MEDICARE

## 2024-11-14 VITALS
HEART RATE: 79 BPM | SYSTOLIC BLOOD PRESSURE: 127 MMHG | WEIGHT: 149.91 LBS | RESPIRATION RATE: 16 BRPM | BODY MASS INDEX: 24.09 KG/M2 | TEMPERATURE: 98.1 F | DIASTOLIC BLOOD PRESSURE: 70 MMHG | HEIGHT: 66 IN | OXYGEN SATURATION: 100 %

## 2024-11-14 DIAGNOSIS — Z78.9 PROBLEM WITH VASCULAR ACCESS: Primary | ICD-10-CM

## 2024-11-14 PROCEDURE — 99283 EMERGENCY DEPT VISIT LOW MDM: CPT

## 2024-11-14 PROCEDURE — 2500000004 HC RX 250 GENERAL PHARMACY W/ HCPCS (ALT 636 FOR OP/ED): Mod: JZ

## 2024-11-14 ASSESSMENT — COLUMBIA-SUICIDE SEVERITY RATING SCALE - C-SSRS
1. IN THE PAST MONTH, HAVE YOU WISHED YOU WERE DEAD OR WISHED YOU COULD GO TO SLEEP AND NOT WAKE UP?: NO
6. HAVE YOU EVER DONE ANYTHING, STARTED TO DO ANYTHING, OR PREPARED TO DO ANYTHING TO END YOUR LIFE?: NO
2. HAVE YOU ACTUALLY HAD ANY THOUGHTS OF KILLING YOURSELF?: NO

## 2024-11-14 ASSESSMENT — PAIN - FUNCTIONAL ASSESSMENT: PAIN_FUNCTIONAL_ASSESSMENT: 0-10

## 2024-11-14 ASSESSMENT — PAIN SCALES - GENERAL: PAINLEVEL_OUTOF10: 0 - NO PAIN

## 2024-11-14 NOTE — TELEPHONE ENCOUNTER
Received call from Heather CASH at [facility] stating that patient has a hematoma at fistula site and that they are trying to use her CVC catheter but V pressures constantly alarm in the 300's and they have removed blood clots from the A port.  Highest blood flow rate is at 250.  Spoke with Kevan Castellanos CNP and was instructed to have them send her to the ED for evaluation and possible catheter exchange.  Voiced understanding.  We will schedule her to see Dr. Harrison when he is back in office.

## 2024-11-14 NOTE — ED TRIAGE NOTES
Patient BIBA when she was at dialysis and they could not get the port to work so they called EMS. Patient gets dialysis on T/Th/Sa. No missed sessions except the one today.

## 2024-11-15 NOTE — ED PROVIDER NOTES
HPI   Chief Complaint   Patient presents with    Vascular Access Problem       HPI  Patient is a 73-year-old female who presents to ED for vascular access problem.  Patient is ESRD on dialysis.  She states her port was clogged and she was sent to ED for evaluation.  No other acute complaints today.      Patient History   Past Medical History:   Diagnosis Date    Atherosclerotic heart disease of native coronary artery without angina pectoris     Coronary heart disease    Chronic kidney disease, stage 3 unspecified (Multi)     Chronic kidney disease, stage III (moderate)    Hyperuricemia without signs of inflammatory arthritis and tophaceous disease     Asymptomatic hyperuricemia    Personal history of diseases of the blood and blood-forming organs and certain disorders involving the immune mechanism     History of anemia    Personal history of Hodgkin lymphoma     History of Hodgkin's lymphoma    Personal history of other diseases of the circulatory system     History of essential hypertension    Personal history of other diseases of the digestive system     History of gastroesophageal reflux (GERD)    Personal history of other diseases of the musculoskeletal system and connective tissue     Personal history of arthritis    Personal history of other endocrine, nutritional and metabolic disease     History of hypothyroidism    Personal history of other endocrine, nutritional and metabolic disease 05/03/2021    History of type 2 diabetes mellitus    Personal history of other endocrine, nutritional and metabolic disease     History of hypercholesterolemia    Personal history of other endocrine, nutritional and metabolic disease     History of thyroid disease    Personal history of other specified conditions     History of shortness of breath    Type 2 diabetes mellitus with other diabetic kidney complication     Type 2 diabetes mellitus with renal manifestations     Past Surgical History:   Procedure Laterality Date     CHOLECYSTECTOMY  03/11/2014    Cholecystectomy Laparoscopic    INVASIVE VASCULAR PROCEDURE Left 9/20/2024    Procedure: Peripheral Fistulagram W/ Declot *labs on admit*;  Surgeon: Brandon Harrison MD;  Location: [facility];  Service: Vascular Surgery;  Laterality: Left;  *labs on admit*    MR HEAD ANGIO WO IV CONTRAST  8/16/2018    MR HEAD ANGIO WO IV CONTRAST [facility]    MR NECK ANGIO WO IV CONTRAST  8/16/2018    MR NECK ANGIO WO IV CONTRAST [facility]    OTHER SURGICAL HISTORY  09/16/2021    Hysterectomy    SINUS SURGERY  08/21/2014    Sinus Surgery    TONSILLECTOMY  08/21/2014    Tonsillectomy     No family history on file.  Social History     Tobacco Use    Smoking status: Never     Passive exposure: Never    Smokeless tobacco: Never   Vaping Use    Vaping status: Not on file   Substance Use Topics    Alcohol use: Not Currently    Drug use: Never       Physical Exam   ED Triage Vitals [11/14/24 1406]   Temperature Heart Rate Respirations BP   36.7 °C (98.1 °F) 79 16 127/70      Pulse Ox Temp Source Heart Rate Source Patient Position   100 % Temporal -- --      BP Location FiO2 (%)     -- --       Physical Exam  Vitals reviewed.   Constitutional:       Appearance: Normal appearance.   HENT:      Head: Normocephalic and atraumatic.   Eyes:      Extraocular Movements: Extraocular movements intact.   Cardiovascular:      Rate and Rhythm: Normal rate and regular rhythm.      Heart sounds: Normal heart sounds.   Pulmonary:      Effort: Pulmonary effort is normal.      Breath sounds: Normal breath sounds.   Abdominal:      General: Abdomen is flat.   Musculoskeletal:         General: Normal range of motion.      Cervical back: Normal range of motion and neck supple.   Skin:     General: Skin is warm and dry.   Neurological:      General: No focal deficit present.      Mental Status: She is alert and oriented to person, place, and time.   Psychiatric:         Mood and Affect: Mood normal.          Behavior: Behavior normal.           ED Course & MDM   Diagnoses as of 11/14/24 2230   Problem with vascular access                 No data recorded     Rake Coma Scale Score: 15 (11/14/24 1404 : Justine Hunter RN)                           Medical Decision Making  Parts of this chart have been completed using voice recognition software. Please excuse any errors of transcription.  My thought process and reason for plan has been formulated from the time that I saw the patient until the time of disposition and is not specific to one specific moment during their visit and furthermore my MDM encompasses this entire chart and not only this text box.    HPI:   A medically appropriate HPI was obtained, outlined above.    Eli Zavala is a  73 y.o. female    Chief Complaint   Patient presents with    Vascular Access Problem       Past Medical History:   Diagnosis Date    Atherosclerotic heart disease of native coronary artery without angina pectoris     Coronary heart disease    Chronic kidney disease, stage 3 unspecified (Multi)     Chronic kidney disease, stage III (moderate)    Hyperuricemia without signs of inflammatory arthritis and tophaceous disease     Asymptomatic hyperuricemia    Personal history of diseases of the blood and blood-forming organs and certain disorders involving the immune mechanism     History of anemia    Personal history of Hodgkin lymphoma     History of Hodgkin's lymphoma    Personal history of other diseases of the circulatory system     History of essential hypertension    Personal history of other diseases of the digestive system     History of gastroesophageal reflux (GERD)    Personal history of other diseases of the musculoskeletal system and connective tissue     Personal history of arthritis    Personal history of other endocrine, nutritional and metabolic disease     History of hypothyroidism    Personal history of other endocrine, nutritional and metabolic disease  05/03/2021    History of type 2 diabetes mellitus    Personal history of other endocrine, nutritional and metabolic disease     History of hypercholesterolemia    Personal history of other endocrine, nutritional and metabolic disease     History of thyroid disease    Personal history of other specified conditions     History of shortness of breath    Type 2 diabetes mellitus with other diabetic kidney complication     Type 2 diabetes mellitus with renal manifestations       Past Surgical History:   Procedure Laterality Date    CHOLECYSTECTOMY  03/11/2014    Cholecystectomy Laparoscopic    INVASIVE VASCULAR PROCEDURE Left 9/20/2024    Procedure: Peripheral Fistulagram W/ Declot *labs on admit*;  Surgeon: Brandon Harrison MD;  Location: [facility];  Service: Vascular Surgery;  Laterality: Left;  *labs on admit*    MR HEAD ANGIO WO IV CONTRAST  8/16/2018    MR HEAD ANGIO WO IV CONTRAST [facility]    MR NECK ANGIO WO IV CONTRAST  8/16/2018    MR NECK ANGIO WO IV CONTRAST [facility]    OTHER SURGICAL HISTORY  09/16/2021    Hysterectomy    SINUS SURGERY  08/21/2014    Sinus Surgery    TONSILLECTOMY  08/21/2014    Tonsillectomy       Social History     Tobacco Use    Smoking status: Never     Passive exposure: Never    Smokeless tobacco: Never   Substance Use Topics    Alcohol use: Not Currently    Drug use: Never       No family history on file.    Allergies   Allergen Reactions    Levofloxacin Itching and Unknown    Atorvastatin Other and Unknown    Fentanyl Nausea/vomiting    Heparin (Porcine) Nausea/vomiting    Sulfamethoxazole-Trimethoprim Other and Unknown    Adhesive Tape-Silicones Unknown    Amoxicillin Unknown    Omeprazole Unknown    Thiopental Unknown    Sulfa (Sulfonamide Antibiotics) Itching and Unknown       Current Outpatient Medications   Medication Instructions    acetaminophen (TYLENOL) 650 mg, Every 6 hours PRN    atorvastatin (LIPITOR) 40 mg, Daily    BD AutoShield Duo Pen  "Needle 30 gauge x 3/16\" needle MAY USE TO INJECT UP TO FOUR TIMES DAILY    butalbital-acetaminophen-caff -40 mg tablet Take by mouth.    calcium carbonate (Tums) 200 mg calcium chewable tablet Every 12 hours PRN    carvedilol (COREG) 25 mg, 2 times daily (morning and late afternoon)    cefpodoxime (VANTIN) 100 mg, oral, 2 times daily    chlorhexidine (Peridex) 0.12 % solution RINSE AND SPIT TWICE A DAY 1/2 OZ FOR 30 SECONDS, START DAY AFTER SX    clobetasol (Temovate) 0.05 % cream Apply to affected vaginal area 2 times a week    clotrimazole (Lotrimin) 1 % cream APPLY TOPICALLY TO AFFECTED AREA 2 TIMES A DAY FOR 7 DAYS    dextrose 15 gram/32 mL gel in packet Take by mouth.    diclofenac (Voltaren) 0.1 % ophthalmic solution 1 DROP IN AFFECTED EYES 4 TIMES A DAY AS NEEDED    fluocinolone (DermOtic) 0.01 % ear drops INSTIL 4 DROPS TO AFFECTED EAR(S) TWICE A DAY FOR 7 DAYS AND AS NEEDED    FreeStyle Ron 2 [place] USE AS DIRECTED    FreeStyle Ron 2 Sensor kit     insulin glargine (LANTUS) 10 Units, Nightly    insulin lispro (HumaLOG KwikPen Insulin) 100 unit/mL injection Inject under the skin. INJECT ACCORDING TO SLIDING SCALE ( 4 18 UNITS BEFORE MEALS)    isosorbide mononitrate ER (IMDUR) 30 mg, Daily    ketoconazole (NIZOral) 2 % cream 2 times daily    levothyroxine (SYNTHROID) 75 mcg, Daily before breakfast    methocarbamol (ROBAXIN) 500 mg, oral, 2 times daily PRN    niacin 50 mg, Daily    nystatin (Mycostatin) 100,000 unit/gram powder Apply to affected area 3 times daily    nystatin (Mycostatin) cream 1 Application, Topical, 2 times daily    pantoprazole (PROTONIX) 40 mg, Daily    polyethylene glycol-electrolytes (polyethylene glycol) 420 gram solution     prednisoLONE acetate (Pred-Forte) 1 % ophthalmic suspension     Purelax 17 gram/dose powder DISSOLVE 17 GM(S) IN 8 OUNCES OF WATER AND DRINK DAILY    Repatha SureClick 140 mg, Every 14 days    sennosides-docusate sodium (Senokot-S) 8.6-50 mg tablet 1 " "tablet, Daily RT    sodium bicarbonate 650 mg tablet TAKE 2 TABLETS BY MOUTH EVERY DAY FOR 90 DAYS    Stool Softener 100 mg, 2 times daily    tiZANidine (ZANAFLEX) 2 mg, oral, Daily PRN    warfarin (COUMADIN) 2.5 mg    warfarin (COUMADIN) 5 mg       History obtained from:   Patient    Exam:   Patient Vitals for the past 24 hrs:   BP Temp Temp src Pulse Resp SpO2 Height Weight   11/14/24 1406 127/70 36.7 °C (98.1 °F) Temporal 79 16 100 % 1.676 m (5' 6\") 68 kg (149 lb 14.6 oz)       A medically appropriate exam performed, outlined above, given the known history and presentation.    EKG/Cardiac monitor:     Social Determinants of Health considered during this visit:     Medications given during visit:  Medications - No data to display     Diagnostic/tests:  Labs Reviewed - No data to display     No orders to display        MDM Summary:  Catheter was successfully unclogged with Cathflo.    We have discussed the diagnosis and risks, and we agree with discharging home to follow-up with appropriate physician as directed. We also discussed returning to the Emergency Department immediately if new or worsening symptoms occur. We have discussed the symptoms which are most concerning that necessitate immediate return. Pt symptoms have been well controlled here and the patient is safe for discharge with appropriate outpatient follow up. The patient has verbalized understanding to return to ER without delay for new or worsening pains or for any other symptoms or concerns. I utilized the discharge clinical management tool provided [employer] to help estimate risk of negative outcome for this patient.      Disposition:  ED Prescriptions    None           Procedure  Procedures     Mehul Mendenhall PA-C  11/14/24 2232    "

## 2024-11-18 ENCOUNTER — ANTICOAGULATION - WARFARIN VISIT (OUTPATIENT)
Dept: CARDIOLOGY | Facility: HOSPITAL | Age: 74
End: 2024-11-18
Payer: MEDICARE

## 2024-11-18 DIAGNOSIS — I48.20 CHRONIC ATRIAL FIBRILLATION (MULTI): ICD-10-CM

## 2024-11-18 LAB
POC INR: 2.1
POC PROTHROMBIN TIME: NORMAL

## 2024-11-18 PROCEDURE — 99211 OFF/OP EST MAY X REQ PHY/QHP: CPT | Performed by: FAMILY MEDICINE

## 2024-11-18 PROCEDURE — 85610 PROTHROMBIN TIME: CPT | Mod: QW

## 2024-11-18 NOTE — PROGRESS NOTES
Patient identification verified with 2 identifiers.    Location: [facility] - 1st Floor Anticoagulation Clinic [address] [id number]    Referring Physician: DR [email or web address]  Enrollment/ Re-enrollment date:    INR Goal: 2.0-3.0  INR monitoring is per [facility] protocol.  Anticoagulation Medication: warfarin  Indication: Atrial Fibrillation/Atrial Flutter    Subjective   Bleeding signs/symptoms: No    Bruising: No   Major bleeding event: No  Thrombosis signs/symptoms: No  Thromboembolic event: No  Missed doses: No  Extra doses: No  Medication changes: No  Dietary changes: No  Change in health: No  Change in activity: No  Alcohol: No  Other concerns: No    Upcoming Procedures:  Does the Patient Have any upcoming procedures that require interruption in anticoagulation therapy? no  Does the patient require bridging? no      Anticoagulation Summary  As of 2024      INR goal:  2.0-3.0   TTR:  18.3% (8.1 mo)   INR used for dosin.10 (2024)   Weekly warfarin total:  22.5 mg               Assessment/Plan   Therapeutic     1. New dose: no change    2. Next INR: 1 month      Education provided to patient during the visit:  Patient instructed to call in interim with questions, concerns and changes.

## 2024-11-25 ENCOUNTER — OFFICE VISIT (OUTPATIENT)
Dept: VASCULAR SURGERY | Facility: CLINIC | Age: 74
End: 2024-11-25
Payer: MEDICARE

## 2024-11-25 VITALS
WEIGHT: 150 LBS | DIASTOLIC BLOOD PRESSURE: 70 MMHG | SYSTOLIC BLOOD PRESSURE: 109 MMHG | OXYGEN SATURATION: 98 % | BODY MASS INDEX: 24.21 KG/M2 | HEART RATE: 83 BPM

## 2024-11-25 DIAGNOSIS — N18.6 STAGE 5 CHRONIC KIDNEY DISEASE ON CHRONIC DIALYSIS (MULTI): Primary | ICD-10-CM

## 2024-11-25 DIAGNOSIS — Z99.2 STAGE 5 CHRONIC KIDNEY DISEASE ON CHRONIC DIALYSIS (MULTI): Primary | ICD-10-CM

## 2024-11-25 PROCEDURE — 3052F HG A1C>EQUAL 8.0%<EQUAL 9.0%: CPT | Performed by: SURGERY

## 2024-11-25 PROCEDURE — 1157F ADVNC CARE PLAN IN RCRD: CPT | Performed by: SURGERY

## 2024-11-25 PROCEDURE — 99212 OFFICE O/P EST SF 10 MIN: CPT | Performed by: SURGERY

## 2024-11-25 PROCEDURE — 1159F MED LIST DOCD IN RCRD: CPT | Performed by: SURGERY

## 2024-11-25 PROCEDURE — 3078F DIAST BP <80 MM HG: CPT | Performed by: SURGERY

## 2024-11-25 PROCEDURE — 3074F SYST BP LT 130 MM HG: CPT | Performed by: SURGERY

## 2024-11-25 PROCEDURE — 1036F TOBACCO NON-USER: CPT | Performed by: SURGERY

## 2024-11-25 ASSESSMENT — ENCOUNTER SYMPTOMS
OCCASIONAL FEELINGS OF UNSTEADINESS: 1
LOSS OF SENSATION IN FEET: 0
DEPRESSION: 0

## 2024-11-25 ASSESSMENT — PATIENT HEALTH QUESTIONNAIRE - PHQ9
2. FEELING DOWN, DEPRESSED OR HOPELESS: NOT AT ALL
SUM OF ALL RESPONSES TO PHQ9 QUESTIONS 1 AND 2: 0
1. LITTLE INTEREST OR PLEASURE IN DOING THINGS: NOT AT ALL

## 2024-11-25 ASSESSMENT — LIFESTYLE VARIABLES
HOW OFTEN DO YOU HAVE SIX OR MORE DRINKS ON ONE OCCASION: LESS THAN MONTHLY
HOW MANY STANDARD DRINKS CONTAINING ALCOHOL DO YOU HAVE ON A TYPICAL DAY: 1 OR 2
HOW OFTEN DO YOU HAVE A DRINK CONTAINING ALCOHOL: MONTHLY OR LESS
SKIP TO QUESTIONS 9-10: 0
AUDIT-C TOTAL SCORE: 2

## 2024-11-25 NOTE — PROGRESS NOTES
Patient comes back for follow-up evaluation.  She has developed an ecchymotic area from extravasation of her fistula.  The fistula itself is widely patent and has an excellent thrill.  It had a flow of 600 mL/min which is ideal.     I have read marked the areas that should get access including areas.  The patient tells me that the techs who were accessing the fistula ignored the marks and this resulted in the ecchymotic area which is about 5 x 10 cm.  I reassured the patient that the bruise will resolve.  There is no pseudoaneurysm that can palpate.  The fistula should be accessible with experience technologist.  I reiterate experience and well-trained technologist need to access this fistula as her skin is quite loose and the fistula does move around.  1 technique involves placing 2 fingers above and below the area of intended access and this will create a pulse as the fistula is occluded and secured underneath the compressive fingers.    To followup prn.

## 2024-12-03 ENCOUNTER — TELEPHONE (OUTPATIENT)
Dept: CARDIOLOGY | Facility: CLINIC | Age: 74
End: 2024-12-03
Payer: MEDICARE

## 2024-12-03 NOTE — TELEPHONE ENCOUNTER
Patient called stating she needed an urgent appt with Dr. Guzman for low blood pressure. Informed patient provider out of office until the 16th and offered sooner appt with Dr. Ayon. Patient took the appointment.

## 2024-12-04 ENCOUNTER — APPOINTMENT (OUTPATIENT)
Dept: CARDIOLOGY | Facility: CLINIC | Age: 74
End: 2024-12-04
Payer: MEDICARE

## 2024-12-06 ENCOUNTER — OFFICE VISIT (OUTPATIENT)
Dept: CARDIOLOGY | Facility: CLINIC | Age: 74
End: 2024-12-06
Payer: MEDICARE

## 2024-12-06 VITALS
OXYGEN SATURATION: 97 % | DIASTOLIC BLOOD PRESSURE: 61 MMHG | HEART RATE: 99 BPM | WEIGHT: 156 LBS | SYSTOLIC BLOOD PRESSURE: 119 MMHG | BODY MASS INDEX: 25.18 KG/M2

## 2024-12-06 DIAGNOSIS — I25.10 ATHEROSCLEROSIS OF NATIVE CORONARY ARTERY OF NATIVE HEART WITHOUT ANGINA PECTORIS: ICD-10-CM

## 2024-12-06 DIAGNOSIS — I10 BENIGN ESSENTIAL HYPERTENSION: Primary | ICD-10-CM

## 2024-12-06 DIAGNOSIS — E78.49 OTHER HYPERLIPIDEMIA: ICD-10-CM

## 2024-12-06 PROBLEM — I25.5 CARDIOMYOPATHY, ISCHEMIC: Status: ACTIVE | Noted: 2024-12-06

## 2024-12-06 PROBLEM — I48.20 CHRONIC ATRIAL FIBRILLATION (MULTI): Status: RESOLVED | Noted: 2023-10-08 | Resolved: 2024-12-06

## 2024-12-06 PROCEDURE — 3078F DIAST BP <80 MM HG: CPT | Performed by: INTERNAL MEDICINE

## 2024-12-06 PROCEDURE — 1159F MED LIST DOCD IN RCRD: CPT | Performed by: INTERNAL MEDICINE

## 2024-12-06 PROCEDURE — 1036F TOBACCO NON-USER: CPT | Performed by: INTERNAL MEDICINE

## 2024-12-06 PROCEDURE — 99214 OFFICE O/P EST MOD 30 MIN: CPT | Performed by: INTERNAL MEDICINE

## 2024-12-06 PROCEDURE — 1157F ADVNC CARE PLAN IN RCRD: CPT | Performed by: INTERNAL MEDICINE

## 2024-12-06 PROCEDURE — 3052F HG A1C>EQUAL 8.0%<EQUAL 9.0%: CPT | Performed by: INTERNAL MEDICINE

## 2024-12-06 PROCEDURE — 1126F AMNT PAIN NOTED NONE PRSNT: CPT | Performed by: INTERNAL MEDICINE

## 2024-12-06 PROCEDURE — 93010 ELECTROCARDIOGRAM REPORT: CPT | Performed by: INTERNAL MEDICINE

## 2024-12-06 PROCEDURE — 3074F SYST BP LT 130 MM HG: CPT | Performed by: INTERNAL MEDICINE

## 2024-12-06 PROCEDURE — 93005 ELECTROCARDIOGRAM TRACING: CPT | Performed by: INTERNAL MEDICINE

## 2024-12-06 RX ORDER — CARVEDILOL 6.25 MG/1
6.25 TABLET ORAL
Qty: 180 TABLET | Refills: 3 | Status: SHIPPED | OUTPATIENT
Start: 2024-12-06 | End: 2025-12-06

## 2024-12-06 ASSESSMENT — ENCOUNTER SYMPTOMS
WEIGHT LOSS: 0
PND: 0
DIZZINESS: 0
LOSS OF SENSATION IN FEET: 0
IRREGULAR HEARTBEAT: 0
FEVER: 0
CLAUDICATION: 0
DYSPNEA ON EXERTION: 0
WEAKNESS: 0
SYNCOPE: 0
OCCASIONAL FEELINGS OF UNSTEADINESS: 0
WEIGHT GAIN: 0
SHORTNESS OF BREATH: 0
PALPITATIONS: 0
DEPRESSION: 0
MYALGIAS: 0
NEAR-SYNCOPE: 0
DIAPHORESIS: 0
ORTHOPNEA: 0
WHEEZING: 0
COUGH: 0

## 2024-12-06 ASSESSMENT — PAIN SCALES - GENERAL: PAINLEVEL_OUTOF10: 0-NO PAIN

## 2024-12-06 NOTE — ASSESSMENT & PLAN NOTE
Not having chest pain, will not resume JOSETTE. EF is 45-50%, will resume carvedilol at 6.25mg BID (was on 25).

## 2024-12-06 NOTE — ASSESSMENT & PLAN NOTE
Stable. Continue repatha, checking lipids. No need for asa while on AC (managed by coumadin clinic).

## 2024-12-06 NOTE — PROGRESS NOTES
Subjective      Chief Complaint   Patient presents with    Nasif pt, needs seen asap due to palps        71-year-old female with history of coronary artery disease with multiple percutaneous coronary interventions in the past.  She has end-stage renal disease and was recently started on hemodialysis.  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 She has an echo from 2023 showing an EF 45-50%. She has been having issues with low BP, stopped carvedilol and isosorbide 2 months ago and wants to know if this is ok.            Review of Systems   Constitutional: Negative for diaphoresis, fever, weight gain and weight loss.   Eyes:  Negative for visual disturbance.   Cardiovascular:  Negative for chest pain, claudication, dyspnea on exertion, irregular heartbeat, leg swelling, near-syncope, orthopnea, palpitations, paroxysmal nocturnal dyspnea and syncope.   Respiratory:  Negative for cough, shortness of breath and wheezing.    Musculoskeletal:  Negative for muscle weakness and myalgias.   Neurological:  Negative for dizziness and weakness.   All other systems reviewed and are negative.       Past Medical History:   Diagnosis Date    Atherosclerotic heart disease of native coronary artery without angina pectoris     Coronary heart disease    Chronic kidney disease, stage 3 unspecified (Multi)     Chronic kidney disease, stage III (moderate)    Hyperuricemia without signs of inflammatory arthritis and tophaceous disease     Asymptomatic hyperuricemia    Personal history of diseases of the blood and blood-forming organs and certain disorders involving the immune mechanism     History of anemia    Personal history of Hodgkin lymphoma     History of Hodgkin's lymphoma    Personal history of other diseases of the circulatory system     History of essential hypertension    Personal history of other diseases of the digestive system     History of gastroesophageal reflux (GERD)    Personal history of other  diseases of the musculoskeletal system and connective tissue     Personal history of arthritis    Personal history of other endocrine, nutritional and metabolic disease     History of hypothyroidism    Personal history of other endocrine, nutritional and metabolic disease 05/03/2021    History of type 2 diabetes mellitus    Personal history of other endocrine, nutritional and metabolic disease     History of hypercholesterolemia    Personal history of other endocrine, nutritional and metabolic disease     History of thyroid disease    Personal history of other specified conditions     History of shortness of breath    Type 2 diabetes mellitus with other diabetic kidney complication     Type 2 diabetes mellitus with renal manifestations        Past Surgical History:   Procedure Laterality Date    CHOLECYSTECTOMY  03/11/2014    Cholecystectomy Laparoscopic    INVASIVE VASCULAR PROCEDURE Left 9/20/2024    Procedure: Peripheral Fistulagram W/ Declot *labs on admit*;  Surgeon: Brandon Harrison MD;  Location: [facility] Card[facility] Lab;  Service: Vascular Surgery;  Laterality: Left;  *labs on admit*    MR HEAD ANGIO WO IV CONTRAST  8/16/2018    MR HEAD ANGIO WO IV CONTRAST [facility]    MR NECK ANGIO WO IV CONTRAST  8/16/2018    MR NECK ANGIO WO IV CONTRAST [facility]    OTHER SURGICAL HISTORY  09/16/2021    Hysterectomy    SINUS SURGERY  08/21/2014    Sinus Surgery    TONSILLECTOMY  08/21/2014    Tonsillectomy        Social History     Socioeconomic History    Marital status:      Spouse name: Not on file    Number of children: Not on file    Years of education: Not on file    Highest education level: Not on file   Occupational History    Not on file   Tobacco Use    Smoking status: Never     Passive exposure: Never    Smokeless tobacco: Never   Vaping Use    Vaping status: Not on file   Substance and Sexual Activity    Alcohol use: Not Currently    Drug use: Never    Sexual activity: Defer   Other  Topics Concern    Not on file   Social History Narrative    Not on file     Social Drivers of Health     Financial Resource Strain: Low Risk  (11/1/2022)    Received from [facility]    Overall Financial Resource Strain (CARDIA)     Difficulty of Paying Living Expenses: Not hard at all   Food Insecurity: No Food Insecurity (11/1/2022)    Received from [facility]    Hunger Vital Sign     Worried About Running Out of Food in the Last Year: Never true     Ran Out of Food in the Last Year: Never true   Transportation Needs: No Transportation Needs (11/1/2022)    Received from [facility]    PRAPARE - Transportation     Lack of Transportation (Medical): No     Lack of Transportation (Non-Medical): No   Physical Activity: Not on file   Stress: Not on file   Social Connections: Not on file   Intimate Partner Violence: Not on file   Housing Stability: Low Risk  (11/1/2022)    Received from [facility]    Housing Stability Vital Sign     Unable to Pay for Housing in the Last Year: No     Number of Places Lived in the Last Year: 1     Unstable Housing in the Last Year: No        No family history on file.     OBJECTIVE:    Vitals:    12/06/24 1324   BP: 119/61   Pulse: 99   SpO2: 97%        Vitals reviewed.   Constitutional:       Appearance: Normal and healthy appearance. Not in distress.   Pulmonary:      Effort: Pulmonary effort is normal.      Breath sounds: Normal breath sounds.   Cardiovascular:      Normal rate. Regular rhythm. Normal S1. Normal S2.       Murmurs: There is no murmur.      No gallop.  No click.   Pulses:     Intact distal pulses.   Edema:     Peripheral edema absent.   Skin:     General: Skin is warm and dry.   Neurological:      General: No focal deficit present.          Lab Review:   Lab Results   Component Value Date     (L) 09/20/2024    K 6.0 (H) 09/20/2024    CL 95 (L) 09/20/2024    CO2 30  09/20/2024    BUN 45 (H) 09/20/2024    CREATININE 3.61 (H) 09/20/2024    GLUCOSE 173 (H) 09/20/2024    CALCIUM 8.7 09/20/2024     Lab Results   Component Value Date    CHOL 165 12/08/2022    TRIG 168 (H) 12/08/2022    HDL 54 12/08/2022       Lab Results   Component Value Date    LDLCALC 77 12/08/2022        Benign essential hypertension  Controlled on no medication    Atherosclerosis of coronary artery  Stable. Continue repatha, checking lipids. No need for asa while on AC (managed by coumadin clinic).    Cardiomyopathy, ischemic  Not having chest pain, will not resume JOSETTE. EF is 45-50%, will resume carvedilol at 6.25mg BID (was on 25).

## 2024-12-11 ENCOUNTER — APPOINTMENT (OUTPATIENT)
Dept: LAB | Facility: LAB | Age: 74
End: 2024-12-11
Payer: MEDICARE

## 2024-12-11 ENCOUNTER — LAB (OUTPATIENT)
Dept: LAB | Facility: LAB | Age: 74
End: 2024-12-11
Payer: MEDICARE

## 2024-12-11 DIAGNOSIS — I25.10 ATHEROSCLEROSIS OF NATIVE CORONARY ARTERY OF NATIVE HEART WITHOUT ANGINA PECTORIS: ICD-10-CM

## 2024-12-11 DIAGNOSIS — Z01.818 PRE-OP TESTING: ICD-10-CM

## 2024-12-11 LAB
ANION GAP SERPL CALCULATED.3IONS-SCNC: 19 MMOL/L (ref 10–20)
BUN SERPL-MCNC: 39 MG/DL (ref 6–23)
CALCIUM SERPL-MCNC: 8.7 MG/DL (ref 8.6–10.3)
CHLORIDE SERPL-SCNC: 95 MMOL/L (ref 98–107)
CHOLEST SERPL-MCNC: 184 MG/DL (ref 0–199)
CHOLEST/HDLC SERPL: 3.2 {RATIO}
CO2 SERPL-SCNC: 23 MMOL/L (ref 21–32)
CREAT SERPL-MCNC: 3.97 MG/DL (ref 0.5–1.05)
EGFRCR SERPLBLD CKD-EPI 2021: 11 ML/MIN/1.73M*2
ERYTHROCYTE [DISTWIDTH] IN BLOOD BY AUTOMATED COUNT: 17.3 % (ref 11.5–14.5)
GLUCOSE SERPL-MCNC: 178 MG/DL (ref 74–99)
HCT VFR BLD AUTO: 41.5 % (ref 36–46)
HDLC SERPL-MCNC: 57.2 MG/DL
HGB BLD-MCNC: 13.4 G/DL (ref 12–16)
LDLC SERPL CALC-MCNC: 90 MG/DL
MCH RBC QN AUTO: 35 PG (ref 26–34)
MCHC RBC AUTO-ENTMCNC: 32.3 G/DL (ref 32–36)
MCV RBC AUTO: 108 FL (ref 80–100)
NON HDL CHOLESTEROL: 127 MG/DL (ref 0–149)
NRBC BLD-RTO: 0 /100 WBCS (ref 0–0)
PLATELET # BLD AUTO: 175 X10*3/UL (ref 150–450)
POTASSIUM SERPL-SCNC: 6 MMOL/L (ref 3.5–5.3)
RBC # BLD AUTO: 3.83 X10*6/UL (ref 4–5.2)
SODIUM SERPL-SCNC: 131 MMOL/L (ref 136–145)
TRIGL SERPL-MCNC: 183 MG/DL (ref 0–149)
VLDL: 37 MG/DL (ref 0–40)
WBC # BLD AUTO: 4.1 X10*3/UL (ref 4.4–11.3)

## 2024-12-11 PROCEDURE — 36415 COLL VENOUS BLD VENIPUNCTURE: CPT

## 2024-12-11 PROCEDURE — 80048 BASIC METABOLIC PNL TOTAL CA: CPT

## 2024-12-11 PROCEDURE — 85027 COMPLETE CBC AUTOMATED: CPT

## 2024-12-11 PROCEDURE — 80061 LIPID PANEL: CPT

## 2024-12-14 ENCOUNTER — LAB (OUTPATIENT)
Dept: LAB | Facility: LAB | Age: 74
End: 2024-12-14
Payer: MEDICARE

## 2024-12-14 DIAGNOSIS — B37.2 YEAST DERMATITIS: ICD-10-CM

## 2024-12-14 DIAGNOSIS — R39.9 UTI SYMPTOMS: ICD-10-CM

## 2024-12-14 LAB
APPEARANCE UR: CLEAR
BACTERIA #/AREA URNS AUTO: ABNORMAL /HPF
BILIRUB UR STRIP.AUTO-MCNC: NEGATIVE MG/DL
COLOR UR: YELLOW
GLUCOSE UR STRIP.AUTO-MCNC: ABNORMAL MG/DL
HOLD SPECIMEN: NORMAL
HYALINE CASTS #/AREA URNS AUTO: ABNORMAL /LPF
KETONES UR STRIP.AUTO-MCNC: NEGATIVE MG/DL
LEUKOCYTE ESTERASE UR QL STRIP.AUTO: NEGATIVE
NITRITE UR QL STRIP.AUTO: NEGATIVE
PH UR STRIP.AUTO: 6 [PH]
PROT UR STRIP.AUTO-MCNC: ABNORMAL MG/DL
RBC # UR STRIP.AUTO: ABNORMAL /UL
RBC #/AREA URNS AUTO: ABNORMAL /HPF
SP GR UR STRIP.AUTO: 1.02
SQUAMOUS #/AREA URNS AUTO: ABNORMAL /HPF
UROBILINOGEN UR STRIP.AUTO-MCNC: NORMAL MG/DL
WBC #/AREA URNS AUTO: ABNORMAL /HPF

## 2024-12-14 PROCEDURE — 81001 URINALYSIS AUTO W/SCOPE: CPT

## 2025-01-03 ENCOUNTER — APPOINTMENT (OUTPATIENT)
Dept: CARDIOLOGY | Facility: CLINIC | Age: 75
End: 2025-01-03
Payer: MEDICARE

## 2025-01-09 ENCOUNTER — ANTICOAGULATION - WARFARIN VISIT (OUTPATIENT)
Dept: CARDIOLOGY | Facility: HOSPITAL | Age: 75
End: 2025-01-09
Payer: MEDICARE

## 2025-01-17 ENCOUNTER — TELEPHONE (OUTPATIENT)
Dept: PAIN MEDICINE | Facility: CLINIC | Age: 75
End: 2025-01-17
Payer: MEDICARE

## 2025-01-20 ENCOUNTER — OFFICE VISIT (OUTPATIENT)
Dept: PAIN MEDICINE | Facility: CLINIC | Age: 75
End: 2025-01-20
Payer: MEDICARE

## 2025-01-20 VITALS
OXYGEN SATURATION: 97 % | SYSTOLIC BLOOD PRESSURE: 148 MMHG | WEIGHT: 156 LBS | RESPIRATION RATE: 18 BRPM | HEIGHT: 66 IN | BODY MASS INDEX: 25.07 KG/M2 | HEART RATE: 81 BPM | DIASTOLIC BLOOD PRESSURE: 68 MMHG

## 2025-01-20 DIAGNOSIS — G62.9 PERIPHERAL POLYNEUROPATHY: Primary | ICD-10-CM

## 2025-01-20 PROCEDURE — 1036F TOBACCO NON-USER: CPT | Performed by: PHYSICIAN ASSISTANT

## 2025-01-20 PROCEDURE — 3078F DIAST BP <80 MM HG: CPT | Performed by: PHYSICIAN ASSISTANT

## 2025-01-20 PROCEDURE — 99215 OFFICE O/P EST HI 40 MIN: CPT | Performed by: PHYSICIAN ASSISTANT

## 2025-01-20 PROCEDURE — 1157F ADVNC CARE PLAN IN RCRD: CPT | Performed by: PHYSICIAN ASSISTANT

## 2025-01-20 PROCEDURE — 3077F SYST BP >= 140 MM HG: CPT | Performed by: PHYSICIAN ASSISTANT

## 2025-01-20 PROCEDURE — 1160F RVW MEDS BY RX/DR IN RCRD: CPT | Performed by: PHYSICIAN ASSISTANT

## 2025-01-20 PROCEDURE — 1159F MED LIST DOCD IN RCRD: CPT | Performed by: PHYSICIAN ASSISTANT

## 2025-01-20 PROCEDURE — 1125F AMNT PAIN NOTED PAIN PRSNT: CPT | Performed by: PHYSICIAN ASSISTANT

## 2025-01-20 ASSESSMENT — PATIENT HEALTH QUESTIONNAIRE - PHQ9
2. FEELING DOWN, DEPRESSED OR HOPELESS: NOT AT ALL
1. LITTLE INTEREST OR PLEASURE IN DOING THINGS: NOT AT ALL
2. FEELING DOWN, DEPRESSED OR HOPELESS: NOT AT ALL
SUM OF ALL RESPONSES TO PHQ9 QUESTIONS 1 & 2: 0
SUM OF ALL RESPONSES TO PHQ9 QUESTIONS 1 & 2: 0
1. LITTLE INTEREST OR PLEASURE IN DOING THINGS: NOT AT ALL

## 2025-01-20 ASSESSMENT — LIFESTYLE VARIABLES
HOW OFTEN DO YOU HAVE A DRINK CONTAINING ALCOHOL: NEVER
HOW OFTEN DO YOU HAVE SIX OR MORE DRINKS ON ONE OCCASION: NEVER
HOW OFTEN DO YOU HAVE SIX OR MORE DRINKS ON ONE OCCASION: NEVER
HOW MANY STANDARD DRINKS CONTAINING ALCOHOL DO YOU HAVE ON A TYPICAL DAY: PATIENT DOES NOT DRINK
SKIP TO QUESTIONS 9-10: 1
AUDIT-C TOTAL SCORE: 0
HOW OFTEN DO YOU HAVE A DRINK CONTAINING ALCOHOL: NEVER
SKIP TO QUESTIONS 9-10: 1
HOW MANY STANDARD DRINKS CONTAINING ALCOHOL DO YOU HAVE ON A TYPICAL DAY: PATIENT DOES NOT DRINK
AUDIT-C TOTAL SCORE: 0

## 2025-01-20 ASSESSMENT — PAIN SCALES - GENERAL
PAINLEVEL_OUTOF10: 8
PAINLEVEL_OUTOF10: 8

## 2025-01-20 ASSESSMENT — PAIN - FUNCTIONAL ASSESSMENT: PAIN_FUNCTIONAL_ASSESSMENT: 0-10

## 2025-01-20 ASSESSMENT — PAIN DESCRIPTION - DESCRIPTORS: DESCRIPTORS: SHOOTING

## 2025-01-20 ASSESSMENT — ENCOUNTER SYMPTOMS: NUMBNESS: 1

## 2025-01-20 NOTE — PROGRESS NOTES
Subjective   Patient ID: Eli Zavala is a 74 y.o. female who presents for No chief complaint on file..  Patient is a 74-year-old female who was recommended by Dr. LOCKE for scrambler therapy for her peripheral polyneuropathy.  Patient denotes her bilateral stocking glove pattern below the ankle.  Patient has not been on any gabapentin or Lyrica in the past 72 hours.  She has not applied any creams salves or lotions to the bilateral lower extremities.    Patient reports her symptoms as a bilateral stocking glove below the ankles.  Numbness is a 10 out of 10.  Intermittently in the a.m. she does did note that she can have 3 to 10 seconds of extreme pain rating about an 8 out of 10.      Review of Systems   Neurological:  Positive for numbness.       Objective   Physical Exam  Musculoskeletal:        Legs:        Assessment/Plan   Problem List Items Addressed This Visit             ICD-10-CM    Peripheral polyneuropathy - Primary G62.9     TREATMENT PLAN:  Day [ 1 ] of Scrambler Therapy.   Verbal consent obtained.  Session initiated by myself with proper electrode/lead placement to applicable dermatomes.   Patient tolerated treatment well .  Encouraged patient to keep a diary or log of symptoms to monitor for any changes in pain, sensation, etc.    Recommend follow-up as needed.       PROCEDURE NOTE:  Treatment [ 1 ] with Scrambler therapy was initiated by myself.   Verbal consent obtained from patient.  Therapy start time: [ 809  ]   Leads were applied to: [ bilateral L5 and S1 dermatomes ]  therapeutic dose :  [  1400 except Left L5 1200  ]    Patient tolerated treatment [ well  ] .  Pain reported at start of therapy session was about a [ 10/10 n/t ].  Pain reported at end of therapy session;  [ 10/10 n/t noticed left foot sensations ] .  Leads were removed and patient left the office without any difficulty.  60 min total scrambler treatment time.         Jose Guadalupe Carroll PA-C 01/20/25 7:47 AM

## 2025-01-21 ENCOUNTER — OFFICE VISIT (OUTPATIENT)
Dept: PAIN MEDICINE | Facility: CLINIC | Age: 75
End: 2025-01-21
Payer: MEDICARE

## 2025-01-21 VITALS — DIASTOLIC BLOOD PRESSURE: 74 MMHG | OXYGEN SATURATION: 97 % | SYSTOLIC BLOOD PRESSURE: 132 MMHG | HEART RATE: 85 BPM

## 2025-01-21 DIAGNOSIS — G62.9 PERIPHERAL POLYNEUROPATHY: Primary | ICD-10-CM

## 2025-01-21 PROCEDURE — 99215 OFFICE O/P EST HI 40 MIN: CPT | Performed by: PHYSICIAN ASSISTANT

## 2025-01-21 PROCEDURE — 1036F TOBACCO NON-USER: CPT | Performed by: PHYSICIAN ASSISTANT

## 2025-01-21 PROCEDURE — 3075F SYST BP GE 130 - 139MM HG: CPT | Performed by: PHYSICIAN ASSISTANT

## 2025-01-21 PROCEDURE — 1160F RVW MEDS BY RX/DR IN RCRD: CPT | Performed by: PHYSICIAN ASSISTANT

## 2025-01-21 PROCEDURE — 1159F MED LIST DOCD IN RCRD: CPT | Performed by: PHYSICIAN ASSISTANT

## 2025-01-21 PROCEDURE — 1157F ADVNC CARE PLAN IN RCRD: CPT | Performed by: PHYSICIAN ASSISTANT

## 2025-01-21 PROCEDURE — 3078F DIAST BP <80 MM HG: CPT | Performed by: PHYSICIAN ASSISTANT

## 2025-01-21 PROCEDURE — 1125F AMNT PAIN NOTED PAIN PRSNT: CPT | Performed by: PHYSICIAN ASSISTANT

## 2025-01-21 ASSESSMENT — ENCOUNTER SYMPTOMS: NUMBNESS: 1

## 2025-01-21 ASSESSMENT — PAIN SCALES - GENERAL: PAINLEVEL_OUTOF10: 7

## 2025-01-21 NOTE — PROGRESS NOTES
Subjective   Patient ID: Eil Zavala is a 74 y.o. female who presents for No chief complaint on file..  Patient is a 74-year-old female who was recommended by Dr. LOCKE for scrambler therapy for her peripheral polyneuropathy.  Patient denotes her bilateral stocking glove pattern below the ankle.  Patient has not been on any gabapentin or Lyrica in the past 72 hours.  She has not applied any creams salves or lotions to the bilateral lower extremities.    Patient reports her symptoms as a bilateral stocking glove below the ankles.  Numbness is a 7 out of 10.  She continues to have the Intermittent a.m. pain, she can have 3 to 10 seconds of extreme pain rating about an 8 out of 10.      Review of Systems   Neurological:  Positive for numbness.       Objective   Physical Exam  Musculoskeletal:        Legs:        Assessment/Plan   Problem List Items Addressed This Visit             ICD-10-CM    Peripheral polyneuropathy - Primary G62.9   TREATMENT PLAN:  Day [ 2 ] of Scrambler Therapy.   Verbal consent obtained.  Session initiated by myself with proper electrode/lead placement to applicable dermatomes.   Patient tolerated treatment well .  Encouraged patient to keep a diary or log of symptoms to monitor for any changes in pain, sensation, etc.    Recommend follow-up as needed.       PROCEDURE NOTE:  Treatment [ 2 ] with Scrambler therapy was initiated by myself.   Verbal consent obtained from patient.  Therapy start time: [ 757  ]   Leads were applied to: [ bilateral L5 and S1 dermatomes ]  therapeutic dose :  [  1200 except Left L5 1100  ]     Patient tolerated treatment [ well  ] .  Pain reported at start of therapy session was about a [ 7/10 n/t ].  Pain reported at end of therapy session;  [ 7/10 n/t noticed left foot sensations ] .  Leads were removed and patient left the office without any difficulty.  60 min total scrambler treatment time.         Jose Guadalupe Carroll PA-C 01/21/25 7:48 AM

## 2025-01-22 ENCOUNTER — OFFICE VISIT (OUTPATIENT)
Dept: PAIN MEDICINE | Facility: CLINIC | Age: 75
End: 2025-01-22
Payer: MEDICARE

## 2025-01-22 VITALS
SYSTOLIC BLOOD PRESSURE: 118 MMHG | BODY MASS INDEX: 25.07 KG/M2 | RESPIRATION RATE: 18 BRPM | OXYGEN SATURATION: 100 % | HEIGHT: 66 IN | WEIGHT: 156 LBS | HEART RATE: 86 BPM | DIASTOLIC BLOOD PRESSURE: 58 MMHG

## 2025-01-22 DIAGNOSIS — G62.9 PERIPHERAL POLYNEUROPATHY: Primary | ICD-10-CM

## 2025-01-22 PROCEDURE — 99215 OFFICE O/P EST HI 40 MIN: CPT | Performed by: PHYSICIAN ASSISTANT

## 2025-01-22 PROCEDURE — 1157F ADVNC CARE PLAN IN RCRD: CPT | Performed by: PHYSICIAN ASSISTANT

## 2025-01-22 PROCEDURE — 1125F AMNT PAIN NOTED PAIN PRSNT: CPT | Performed by: PHYSICIAN ASSISTANT

## 2025-01-22 PROCEDURE — 3074F SYST BP LT 130 MM HG: CPT | Performed by: PHYSICIAN ASSISTANT

## 2025-01-22 PROCEDURE — 1159F MED LIST DOCD IN RCRD: CPT | Performed by: PHYSICIAN ASSISTANT

## 2025-01-22 PROCEDURE — 1160F RVW MEDS BY RX/DR IN RCRD: CPT | Performed by: PHYSICIAN ASSISTANT

## 2025-01-22 PROCEDURE — 3078F DIAST BP <80 MM HG: CPT | Performed by: PHYSICIAN ASSISTANT

## 2025-01-22 PROCEDURE — 1036F TOBACCO NON-USER: CPT | Performed by: PHYSICIAN ASSISTANT

## 2025-01-22 ASSESSMENT — LIFESTYLE VARIABLES
HOW MANY STANDARD DRINKS CONTAINING ALCOHOL DO YOU HAVE ON A TYPICAL DAY: PATIENT DOES NOT DRINK
AUDIT-C TOTAL SCORE: 0
HOW OFTEN DO YOU HAVE SIX OR MORE DRINKS ON ONE OCCASION: NEVER
HOW OFTEN DO YOU HAVE A DRINK CONTAINING ALCOHOL: NEVER
SKIP TO QUESTIONS 9-10: 1

## 2025-01-22 ASSESSMENT — PATIENT HEALTH QUESTIONNAIRE - PHQ9
2. FEELING DOWN, DEPRESSED OR HOPELESS: NOT AT ALL
SUM OF ALL RESPONSES TO PHQ9 QUESTIONS 1 & 2: 0
1. LITTLE INTEREST OR PLEASURE IN DOING THINGS: NOT AT ALL

## 2025-01-22 ASSESSMENT — PAIN - FUNCTIONAL ASSESSMENT: PAIN_FUNCTIONAL_ASSESSMENT: 0-10

## 2025-01-22 ASSESSMENT — PAIN DESCRIPTION - DESCRIPTORS: DESCRIPTORS: NUMBNESS

## 2025-01-22 ASSESSMENT — PAIN SCALES - GENERAL
PAINLEVEL_OUTOF10: 8
PAINLEVEL_OUTOF10: 8

## 2025-01-22 ASSESSMENT — ENCOUNTER SYMPTOMS: NUMBNESS: 1

## 2025-01-23 ENCOUNTER — OFFICE VISIT (OUTPATIENT)
Dept: PAIN MEDICINE | Facility: CLINIC | Age: 75
End: 2025-01-23
Payer: MEDICARE

## 2025-01-23 VITALS
DIASTOLIC BLOOD PRESSURE: 68 MMHG | SYSTOLIC BLOOD PRESSURE: 140 MMHG | HEART RATE: 78 BPM | RESPIRATION RATE: 18 BRPM | OXYGEN SATURATION: 98 % | HEIGHT: 66 IN | WEIGHT: 156 LBS | BODY MASS INDEX: 25.07 KG/M2

## 2025-01-23 DIAGNOSIS — G62.9 PERIPHERAL POLYNEUROPATHY: Primary | ICD-10-CM

## 2025-01-23 PROCEDURE — 3078F DIAST BP <80 MM HG: CPT | Performed by: PHYSICIAN ASSISTANT

## 2025-01-23 PROCEDURE — 3077F SYST BP >= 140 MM HG: CPT | Performed by: PHYSICIAN ASSISTANT

## 2025-01-23 PROCEDURE — 99215 OFFICE O/P EST HI 40 MIN: CPT | Performed by: PHYSICIAN ASSISTANT

## 2025-01-23 PROCEDURE — 1125F AMNT PAIN NOTED PAIN PRSNT: CPT | Performed by: PHYSICIAN ASSISTANT

## 2025-01-23 PROCEDURE — 1160F RVW MEDS BY RX/DR IN RCRD: CPT | Performed by: PHYSICIAN ASSISTANT

## 2025-01-23 PROCEDURE — 1157F ADVNC CARE PLAN IN RCRD: CPT | Performed by: PHYSICIAN ASSISTANT

## 2025-01-23 PROCEDURE — 1036F TOBACCO NON-USER: CPT | Performed by: PHYSICIAN ASSISTANT

## 2025-01-23 PROCEDURE — 1159F MED LIST DOCD IN RCRD: CPT | Performed by: PHYSICIAN ASSISTANT

## 2025-01-23 ASSESSMENT — LIFESTYLE VARIABLES
HOW MANY STANDARD DRINKS CONTAINING ALCOHOL DO YOU HAVE ON A TYPICAL DAY: PATIENT DOES NOT DRINK
AUDIT-C TOTAL SCORE: 0
HOW OFTEN DO YOU HAVE A DRINK CONTAINING ALCOHOL: NEVER
SKIP TO QUESTIONS 9-10: 1
HOW OFTEN DO YOU HAVE SIX OR MORE DRINKS ON ONE OCCASION: NEVER

## 2025-01-23 ASSESSMENT — ENCOUNTER SYMPTOMS
PAIN: 1
NUMBNESS: 1

## 2025-01-23 ASSESSMENT — PAIN - FUNCTIONAL ASSESSMENT: PAIN_FUNCTIONAL_ASSESSMENT: 0-10

## 2025-01-23 ASSESSMENT — PAIN SCALES - GENERAL
PAINLEVEL_OUTOF10: 7
PAINLEVEL_OUTOF10: 7

## 2025-01-23 ASSESSMENT — PAIN DESCRIPTION - DESCRIPTORS: DESCRIPTORS: NUMBNESS

## 2025-01-23 NOTE — PROGRESS NOTES
Subjective   Patient ID: Eli Zavala is a 74 y.o. female who presents for Pain (Scrambler therapy).  Patient is a 74-year-old female who was recommended by Dr. LOCKE for scrambler therapy for her peripheral polyneuropathy.  Patient denotes her bilateral stocking glove pattern below the ankle.  Patient has not been on any gabapentin or Lyrica in the past 72 hours.  She has not applied any creams salves or lotions to the bilateral lower extremities.    Patient reports her symptoms as a bilateral stocking glove below the ankles.  Numbness is a 6 out of 10.  She feels that she is having increased sensation with impact of her foot with gait.     Pain      Review of Systems   Neurological:  Positive for numbness.       Objective   Physical Exam  Musculoskeletal:        Legs:        Assessment/Plan   Problem List Items Addressed This Visit             ICD-10-CM    Peripheral polyneuropathy - Primary G62.9     TREATMENT PLAN:  Day [ 4 ] of Scrambler Therapy.   Verbal consent obtained.  Session initiated by myself with proper electrode/lead placement to applicable dermatomes.   Patient tolerated treatment well .  Encouraged patient to keep a diary or log of symptoms to monitor for any changes in pain, sensation, etc.    Recommend follow-up as needed.       PROCEDURE NOTE:  Treatment [ 4 ] with Scrambler therapy was initiated by myself.   Verbal consent obtained from patient.  Therapy start time: [ 807  ]   Leads were applied to: [ bilateral L5 and S1 dermatomes ]  therapeutic dose :  [  1500  ]     Patient tolerated treatment [ well  ] .  Pain reported at start of therapy session was about a [ 6/10 n/t ].  Pain reported at end of therapy session;  [ 6/10 n/t noticed left foot sensations ] .  Leads were removed and patient left the office without any difficulty.  60 min total scrambler treatment time.       Jose Guadalupe Carroll PA-C 01/23/25 8:00 AM

## 2025-01-24 ENCOUNTER — OFFICE VISIT (OUTPATIENT)
Dept: PAIN MEDICINE | Facility: CLINIC | Age: 75
End: 2025-01-24
Payer: MEDICARE

## 2025-01-24 VITALS
BODY MASS INDEX: 25.07 KG/M2 | DIASTOLIC BLOOD PRESSURE: 64 MMHG | SYSTOLIC BLOOD PRESSURE: 118 MMHG | HEIGHT: 66 IN | OXYGEN SATURATION: 99 % | HEART RATE: 84 BPM | WEIGHT: 156 LBS | RESPIRATION RATE: 18 BRPM

## 2025-01-24 DIAGNOSIS — G62.9 PERIPHERAL POLYNEUROPATHY: Primary | ICD-10-CM

## 2025-01-24 PROCEDURE — 1160F RVW MEDS BY RX/DR IN RCRD: CPT | Performed by: PHYSICIAN ASSISTANT

## 2025-01-24 PROCEDURE — 1159F MED LIST DOCD IN RCRD: CPT | Performed by: PHYSICIAN ASSISTANT

## 2025-01-24 PROCEDURE — 1125F AMNT PAIN NOTED PAIN PRSNT: CPT | Performed by: PHYSICIAN ASSISTANT

## 2025-01-24 PROCEDURE — 1157F ADVNC CARE PLAN IN RCRD: CPT | Performed by: PHYSICIAN ASSISTANT

## 2025-01-24 PROCEDURE — 99215 OFFICE O/P EST HI 40 MIN: CPT | Performed by: PHYSICIAN ASSISTANT

## 2025-01-24 PROCEDURE — 3078F DIAST BP <80 MM HG: CPT | Performed by: PHYSICIAN ASSISTANT

## 2025-01-24 PROCEDURE — 1036F TOBACCO NON-USER: CPT | Performed by: PHYSICIAN ASSISTANT

## 2025-01-24 PROCEDURE — 3074F SYST BP LT 130 MM HG: CPT | Performed by: PHYSICIAN ASSISTANT

## 2025-01-24 ASSESSMENT — PATIENT HEALTH QUESTIONNAIRE - PHQ9
2. FEELING DOWN, DEPRESSED OR HOPELESS: NOT AT ALL
1. LITTLE INTEREST OR PLEASURE IN DOING THINGS: NOT AT ALL
SUM OF ALL RESPONSES TO PHQ9 QUESTIONS 1 & 2: 0

## 2025-01-24 ASSESSMENT — PAIN SCALES - GENERAL
PAINLEVEL_OUTOF10: 6
PAINLEVEL_OUTOF10: 6

## 2025-01-24 ASSESSMENT — LIFESTYLE VARIABLES
HOW MANY STANDARD DRINKS CONTAINING ALCOHOL DO YOU HAVE ON A TYPICAL DAY: PATIENT DOES NOT DRINK
HOW OFTEN DO YOU HAVE A DRINK CONTAINING ALCOHOL: NEVER
AUDIT-C TOTAL SCORE: 0
SKIP TO QUESTIONS 9-10: 1
HOW OFTEN DO YOU HAVE SIX OR MORE DRINKS ON ONE OCCASION: NEVER

## 2025-01-24 ASSESSMENT — ENCOUNTER SYMPTOMS
PAIN: 1
NUMBNESS: 1

## 2025-01-24 ASSESSMENT — PAIN - FUNCTIONAL ASSESSMENT: PAIN_FUNCTIONAL_ASSESSMENT: 0-10

## 2025-01-24 NOTE — PROGRESS NOTES
Subjective   Patient ID: Eli Zavala is a 74 y.o. female who presents for No chief complaint on file..  Patient is a 74-year-old female who was recommended by Dr. LOCKE for scrambler therapy for her peripheral polyneuropathy.  Patient denotes her bilateral stocking glove pattern below the ankle.  Patient has not been on any gabapentin or Lyrica in the past 72 hours.  She has not applied any creams salves or lotions to the bilateral lower extremities.    Patient reports her symptoms as a bilateral stocking glove below the ankles.  Numbness is a 6 out of 10.      Pain      Review of Systems   Neurological:  Positive for numbness.       Objective   Physical Exam  Musculoskeletal:        Legs:        Assessment/Plan   Problem List Items Addressed This Visit             ICD-10-CM    Peripheral polyneuropathy - Primary G62.9     TREATMENT PLAN:  Day [ 5 ] of Scrambler Therapy.   Verbal consent obtained.  Session initiated by myself with proper electrode/lead placement to applicable dermatomes.   Patient tolerated treatment well .  Encouraged patient to keep a diary or log of symptoms to monitor for any changes in pain, sensation, etc.    Recommend follow-up as needed.       PROCEDURE NOTE:  Treatment [ 5 ] with Scrambler therapy was initiated by myself.   Verbal consent obtained from patient.  Therapy start time: [ 807 ]   Leads were applied to: [ bilateral L5 and S1 dermatomes ]  therapeutic dose :  [  RLE 1500  LLE 1300 ]     Patient tolerated treatment [ well  ] .  Pain reported at start of therapy session was about a [ 6/10 n/t ].  Pain reported at end of therapy session;  [ 5.5/10 n/t noticed left foot sensations ] .  Leads were removed and patient left the office without any difficulty.  60 min total scrambler treatment time.       Jose Guadalupe Carroll PA-C 01/24/25 8:14 AM

## 2025-01-27 ENCOUNTER — ANTICOAGULATION - WARFARIN VISIT (OUTPATIENT)
Dept: CARDIOLOGY | Facility: HOSPITAL | Age: 75
End: 2025-01-27
Payer: MEDICARE

## 2025-01-27 ENCOUNTER — OFFICE VISIT (OUTPATIENT)
Dept: VASCULAR SURGERY | Facility: CLINIC | Age: 75
End: 2025-01-27
Payer: MEDICARE

## 2025-01-27 ENCOUNTER — OFFICE VISIT (OUTPATIENT)
Dept: PAIN MEDICINE | Facility: CLINIC | Age: 75
End: 2025-01-27
Payer: MEDICARE

## 2025-01-27 VITALS
OXYGEN SATURATION: 94 % | BODY MASS INDEX: 25.18 KG/M2 | DIASTOLIC BLOOD PRESSURE: 74 MMHG | HEART RATE: 74 BPM | WEIGHT: 156 LBS | SYSTOLIC BLOOD PRESSURE: 140 MMHG

## 2025-01-27 VITALS
HEIGHT: 66 IN | DIASTOLIC BLOOD PRESSURE: 60 MMHG | WEIGHT: 156 LBS | OXYGEN SATURATION: 100 % | RESPIRATION RATE: 18 BRPM | SYSTOLIC BLOOD PRESSURE: 130 MMHG | HEART RATE: 82 BPM | BODY MASS INDEX: 25.07 KG/M2

## 2025-01-27 DIAGNOSIS — G62.9 PERIPHERAL POLYNEUROPATHY: Primary | ICD-10-CM

## 2025-01-27 DIAGNOSIS — Z99.2 CKD (CHRONIC KIDNEY DISEASE) REQUIRING CHRONIC DIALYSIS (MULTI): Primary | ICD-10-CM

## 2025-01-27 DIAGNOSIS — I48.20 CHRONIC ATRIAL FIBRILLATION (MULTI): Primary | ICD-10-CM

## 2025-01-27 DIAGNOSIS — N18.6 CKD (CHRONIC KIDNEY DISEASE) REQUIRING CHRONIC DIALYSIS (MULTI): Primary | ICD-10-CM

## 2025-01-27 LAB
POC INR: 1.3
POC PROTHROMBIN TIME: NORMAL

## 2025-01-27 PROCEDURE — 3078F DIAST BP <80 MM HG: CPT | Performed by: PHYSICIAN ASSISTANT

## 2025-01-27 PROCEDURE — 99211 OFF/OP EST MAY X REQ PHY/QHP: CPT | Mod: 27 | Performed by: FAMILY MEDICINE

## 2025-01-27 PROCEDURE — 1036F TOBACCO NON-USER: CPT | Performed by: SURGERY

## 2025-01-27 PROCEDURE — 1160F RVW MEDS BY RX/DR IN RCRD: CPT | Performed by: PHYSICIAN ASSISTANT

## 2025-01-27 PROCEDURE — 1159F MED LIST DOCD IN RCRD: CPT | Performed by: SURGERY

## 2025-01-27 PROCEDURE — 1159F MED LIST DOCD IN RCRD: CPT | Performed by: PHYSICIAN ASSISTANT

## 2025-01-27 PROCEDURE — 1157F ADVNC CARE PLAN IN RCRD: CPT | Performed by: SURGERY

## 2025-01-27 PROCEDURE — 1157F ADVNC CARE PLAN IN RCRD: CPT | Performed by: PHYSICIAN ASSISTANT

## 2025-01-27 PROCEDURE — 1125F AMNT PAIN NOTED PAIN PRSNT: CPT | Performed by: PHYSICIAN ASSISTANT

## 2025-01-27 PROCEDURE — 99215 OFFICE O/P EST HI 40 MIN: CPT | Performed by: PHYSICIAN ASSISTANT

## 2025-01-27 PROCEDURE — 3077F SYST BP >= 140 MM HG: CPT | Performed by: SURGERY

## 2025-01-27 PROCEDURE — 99212 OFFICE O/P EST SF 10 MIN: CPT | Mod: 27 | Performed by: SURGERY

## 2025-01-27 PROCEDURE — 3078F DIAST BP <80 MM HG: CPT | Performed by: SURGERY

## 2025-01-27 PROCEDURE — 85610 PROTHROMBIN TIME: CPT | Mod: QW

## 2025-01-27 PROCEDURE — 99212 OFFICE O/P EST SF 10 MIN: CPT | Performed by: SURGERY

## 2025-01-27 PROCEDURE — 1036F TOBACCO NON-USER: CPT | Performed by: PHYSICIAN ASSISTANT

## 2025-01-27 PROCEDURE — 3075F SYST BP GE 130 - 139MM HG: CPT | Performed by: PHYSICIAN ASSISTANT

## 2025-01-27 ASSESSMENT — LIFESTYLE VARIABLES
AUDIT-C TOTAL SCORE: 2
SKIP TO QUESTIONS 9-10: 0
HOW OFTEN DO YOU HAVE A DRINK CONTAINING ALCOHOL: MONTHLY OR LESS
HOW MANY STANDARD DRINKS CONTAINING ALCOHOL DO YOU HAVE ON A TYPICAL DAY: 1 OR 2
HOW OFTEN DO YOU HAVE SIX OR MORE DRINKS ON ONE OCCASION: NEVER
SKIP TO QUESTIONS 9-10: 1
HOW MANY STANDARD DRINKS CONTAINING ALCOHOL DO YOU HAVE ON A TYPICAL DAY: PATIENT DOES NOT DRINK
HOW OFTEN DO YOU HAVE SIX OR MORE DRINKS ON ONE OCCASION: LESS THAN MONTHLY
AUDIT-C TOTAL SCORE: 0
HOW OFTEN DO YOU HAVE A DRINK CONTAINING ALCOHOL: NEVER

## 2025-01-27 ASSESSMENT — ENCOUNTER SYMPTOMS
LOSS OF SENSATION IN FEET: 0
NUMBNESS: 1
DEPRESSION: 0
OCCASIONAL FEELINGS OF UNSTEADINESS: 1
PAIN: 1

## 2025-01-27 ASSESSMENT — PATIENT HEALTH QUESTIONNAIRE - PHQ9
1. LITTLE INTEREST OR PLEASURE IN DOING THINGS: NOT AT ALL
SUM OF ALL RESPONSES TO PHQ9 QUESTIONS 1 AND 2: 0
SUM OF ALL RESPONSES TO PHQ9 QUESTIONS 1 & 2: 0
2. FEELING DOWN, DEPRESSED OR HOPELESS: NOT AT ALL
1. LITTLE INTEREST OR PLEASURE IN DOING THINGS: NOT AT ALL
2. FEELING DOWN, DEPRESSED OR HOPELESS: NOT AT ALL

## 2025-01-27 ASSESSMENT — PAIN SCALES - GENERAL
PAINLEVEL_OUTOF10: 5
PAINLEVEL_OUTOF10: 5 - MODERATE PAIN

## 2025-01-27 ASSESSMENT — PAIN - FUNCTIONAL ASSESSMENT: PAIN_FUNCTIONAL_ASSESSMENT: 0-10

## 2025-01-27 NOTE — PROGRESS NOTES
Patient arrives for an evaluation of her fistula.  She reportedly is able to complete about 3 and half hours of dialysis before she gets nausea and hypotensive.  She has been started on midodrine and attempt to throughout this.  She says that at this point in her dialysis she will trip several dialysis alarms according to the patient who has been told by her dialysis nurse.    Examination of fistula demonstrates an excellent thrill and accessibility of the fistula.  There is some dilatation of the proximal fistula and a change in the exam in the mid fistula which may suggest a moderate stenosis in the fistula.  That said the access points bracket this and should provide excellent dialysis.  I believe that the alarms are likely due to her hypotension and may be improved with the midodrine.  If the alarms continue she should come back and we will set her up for a fistulogram.    If the dialysis proceeds well then we should set her up to have the catheter removed.  This can be done here at this office or at many facilities.

## 2025-01-27 NOTE — PROGRESS NOTES
Patient identification verified with 2 identifiers.    Location: [facility] - 1st Floor Anticoagulation Clinic [address] [id number]    Referring Physician: DR Bonilla  Enrollment/ Re-enrollment date: 2025   INR Goal: 2.0-3.0  INR monitoring is per [facility] protocol.  Anticoagulation Medication: warfarin  Indication: Pulmonary Embolism (PE)    Subjective   Bleeding signs/symptoms: No    Bruising: No   Major bleeding event: No  Thrombosis signs/symptoms: No  Thromboembolic event: No  Missed doses: Yes  missed 2 doses last week  Extra doses: No  Medication changes: No  Dietary changes: No  Change in health: No  Change in activity: No  Alcohol: No  Other concerns: No    Upcoming Procedures:  Does the Patient Have any upcoming procedures that require interruption in anticoagulation therapy? no  Does the patient require bridging? no      Anticoagulation Summary  As of 1/27/2025      INR goal:  2.0-3.0   TTR:  18.3% (8.1 mo)   INR used for dosing:  --               Assessment/Plan   Subtherapeutic     1. New dose: no change    2. Next INR: 2 weeks      Education provided to patient during the visit:  Patient instructed to call in interim with questions, concerns and changes.

## 2025-01-27 NOTE — PROGRESS NOTES
Subjective   Patient ID: Eli Zavala is a 74 y.o. female who presents for No chief complaint on file..  Patient is a 74-year-old female who was recommended by Dr. LOCKE for scrambler therapy for her peripheral polyneuropathy.  Patient denotes her bilateral stocking glove pattern below the ankle.  Patient has not been on any gabapentin or Lyrica in the past 72 hours.  She has not applied any creams salves or lotions to the bilateral lower extremities.    Patient reports her symptoms as a bilateral stocking glove below the ankles.  Numbness is a 5 out of 10.  Pt states over weekend she had minimal shocks.     Pain      Review of Systems   Neurological:  Positive for numbness.       Objective   Physical Exam  Musculoskeletal:        Legs:        Assessment/Plan   Problem List Items Addressed This Visit             ICD-10-CM    Peripheral polyneuropathy - Primary G62.9     TREATMENT PLAN:  Day [ 6 ] of Scrambler Therapy.   Verbal consent obtained.  Session initiated by myself with proper electrode/lead placement to applicable dermatomes.   Patient tolerated treatment well .  Encouraged patient to keep a diary or log of symptoms to monitor for any changes in pain, sensation, etc.    Recommend follow-up as needed.       PROCEDURE NOTE:  Treatment [ 6 ] with Scrambler therapy was initiated by myself.   Verbal consent obtained from patient.  Therapy start time: [ 807 ]   Leads were applied to: [ bilateral L5 and S1 dermatomes ]  therapeutic dose :  [  RLE 1400  LLE 1400 ]     Patient tolerated treatment [ well  ] .  Pain reported at start of therapy session was about a [ 5/10 n/t ].  Pain reported at end of therapy session;  [ 5/10 n/t noticed left foot sensations ] .  Leads were removed and patient left the office without any difficulty.  60 min total scrambler treatment time.       Jose Guadalupe Carroll PA-C 01/27/25 7:49 AM

## 2025-01-28 ENCOUNTER — OFFICE VISIT (OUTPATIENT)
Dept: PAIN MEDICINE | Facility: CLINIC | Age: 75
End: 2025-01-28
Payer: MEDICARE

## 2025-01-28 VITALS
WEIGHT: 156 LBS | HEIGHT: 66 IN | BODY MASS INDEX: 25.07 KG/M2 | SYSTOLIC BLOOD PRESSURE: 126 MMHG | RESPIRATION RATE: 22 BRPM | DIASTOLIC BLOOD PRESSURE: 66 MMHG | HEART RATE: 80 BPM | OXYGEN SATURATION: 98 %

## 2025-01-28 DIAGNOSIS — G62.9 PERIPHERAL POLYNEUROPATHY: Primary | ICD-10-CM

## 2025-01-28 PROCEDURE — 3078F DIAST BP <80 MM HG: CPT | Performed by: PHYSICIAN ASSISTANT

## 2025-01-28 PROCEDURE — 99215 OFFICE O/P EST HI 40 MIN: CPT | Performed by: PHYSICIAN ASSISTANT

## 2025-01-28 PROCEDURE — 1157F ADVNC CARE PLAN IN RCRD: CPT | Performed by: PHYSICIAN ASSISTANT

## 2025-01-28 PROCEDURE — 1125F AMNT PAIN NOTED PAIN PRSNT: CPT | Performed by: PHYSICIAN ASSISTANT

## 2025-01-28 PROCEDURE — 1036F TOBACCO NON-USER: CPT | Performed by: PHYSICIAN ASSISTANT

## 2025-01-28 PROCEDURE — 3074F SYST BP LT 130 MM HG: CPT | Performed by: PHYSICIAN ASSISTANT

## 2025-01-28 PROCEDURE — 1159F MED LIST DOCD IN RCRD: CPT | Performed by: PHYSICIAN ASSISTANT

## 2025-01-28 ASSESSMENT — PAIN SCALES - GENERAL
PAINLEVEL_OUTOF10: 0-NO PAIN
PAINLEVEL_OUTOF10: 0 - NO PAIN

## 2025-01-28 ASSESSMENT — PAIN DESCRIPTION - DESCRIPTORS: DESCRIPTORS: NUMBNESS;TINGLING

## 2025-01-28 ASSESSMENT — PAIN - FUNCTIONAL ASSESSMENT
PAIN_FUNCTIONAL_ASSESSMENT: 0-10
PAIN_FUNCTIONAL_ASSESSMENT: 0-10

## 2025-01-28 ASSESSMENT — PATIENT HEALTH QUESTIONNAIRE - PHQ9
1. LITTLE INTEREST OR PLEASURE IN DOING THINGS: NOT AT ALL
2. FEELING DOWN, DEPRESSED OR HOPELESS: NOT AT ALL
SUM OF ALL RESPONSES TO PHQ9 QUESTIONS 1 & 2: 0

## 2025-01-28 ASSESSMENT — ENCOUNTER SYMPTOMS
NUMBNESS: 1
PAIN: 1

## 2025-01-28 NOTE — PROGRESS NOTES
Subjective   Patient ID: Eli Zavala is a 74 y.o. female who presents for Pain (Feet.).  Patient is a 74-year-old female who was recommended by Dr. LOCKE for scrambler therapy for her peripheral polyneuropathy.  Patient denotes her bilateral stocking glove pattern below the ankle.  Patient has not been on any gabapentin or Lyrica in the past 72 hours.  She has not applied any creams salves or lotions to the bilateral lower extremities.    Patient reports her Numbness is a 2 out of 10 in the right arch only and the left 0/10. Last night there was bilateral numbness., patient states that she has had major changes to the distribution of numbness.     Pain        Review of Systems   Neurological:  Positive for numbness.       Objective   Physical Exam  Musculoskeletal:        Legs:         Feet:        Assessment/Plan   Problem List Items Addressed This Visit             ICD-10-CM    Peripheral polyneuropathy - Primary G62.9   TREATMENT PLAN:  Day [ 7 ] of Scrambler Therapy.   Verbal consent obtained.  Session initiated by myself with proper electrode/lead placement to applicable dermatomes.   Patient tolerated treatment well .  Encouraged patient to keep a diary or log of symptoms to monitor for any changes in pain, sensation, etc.    Recommend follow-up as needed.       PROCEDURE NOTE:  Treatment [ 7 ] with Scrambler therapy was initiated by myself.   Verbal consent obtained from patient.  Therapy start time: [ 807 ]   Leads were applied to: [ bilateral L4 and L5 dermatomes ]  therapeutic dose :  [  RLE 1400  LLE 1400 ]     Patient tolerated treatment [ well  ] .  Pain reported at start of therapy session was about a [ 2/10 n/t ].  Pain reported at end of therapy session;  [ 1/10 n/t noticed left foot sensations ] .  Leads were removed and patient left the office without any difficulty.  60 min total scrambler treatment time.         Jose Guadalupe Carroll PA-C 01/28/25 7:51 AM

## 2025-01-29 ENCOUNTER — APPOINTMENT (OUTPATIENT)
Dept: PAIN MEDICINE | Facility: CLINIC | Age: 75
End: 2025-01-29
Payer: MEDICARE

## 2025-01-29 ENCOUNTER — OFFICE VISIT (OUTPATIENT)
Dept: PAIN MEDICINE | Facility: CLINIC | Age: 75
End: 2025-01-29
Payer: MEDICARE

## 2025-01-29 VITALS
RESPIRATION RATE: 16 BRPM | BODY MASS INDEX: 25.07 KG/M2 | WEIGHT: 156 LBS | OXYGEN SATURATION: 97 % | DIASTOLIC BLOOD PRESSURE: 72 MMHG | SYSTOLIC BLOOD PRESSURE: 126 MMHG | HEIGHT: 66 IN | HEART RATE: 92 BPM

## 2025-01-29 DIAGNOSIS — G62.9 PERIPHERAL POLYNEUROPATHY: Primary | ICD-10-CM

## 2025-01-29 PROCEDURE — 3074F SYST BP LT 130 MM HG: CPT | Performed by: PHYSICIAN ASSISTANT

## 2025-01-29 PROCEDURE — 1159F MED LIST DOCD IN RCRD: CPT | Performed by: PHYSICIAN ASSISTANT

## 2025-01-29 PROCEDURE — 3078F DIAST BP <80 MM HG: CPT | Performed by: PHYSICIAN ASSISTANT

## 2025-01-29 PROCEDURE — 99215 OFFICE O/P EST HI 40 MIN: CPT | Performed by: PHYSICIAN ASSISTANT

## 2025-01-29 PROCEDURE — 1157F ADVNC CARE PLAN IN RCRD: CPT | Performed by: PHYSICIAN ASSISTANT

## 2025-01-29 PROCEDURE — 1125F AMNT PAIN NOTED PAIN PRSNT: CPT | Performed by: PHYSICIAN ASSISTANT

## 2025-01-29 PROCEDURE — 1036F TOBACCO NON-USER: CPT | Performed by: PHYSICIAN ASSISTANT

## 2025-01-29 PROCEDURE — 1160F RVW MEDS BY RX/DR IN RCRD: CPT | Performed by: PHYSICIAN ASSISTANT

## 2025-01-29 ASSESSMENT — PAIN DESCRIPTION - DESCRIPTORS: DESCRIPTORS: NUMBNESS;TINGLING

## 2025-01-29 ASSESSMENT — ENCOUNTER SYMPTOMS
PAIN: 1
NUMBNESS: 1

## 2025-01-29 ASSESSMENT — PAIN SCALES - GENERAL
PAINLEVEL_OUTOF10: 4
PAINLEVEL_OUTOF10: 4

## 2025-01-29 ASSESSMENT — PAIN - FUNCTIONAL ASSESSMENT: PAIN_FUNCTIONAL_ASSESSMENT: 0-10

## 2025-01-29 NOTE — PROGRESS NOTES
Subjective   Patient ID: Eli Zavala is a 74 y.o. female who presents for No chief complaint on file..  Patient is a 74-year-old female who was recommended by Dr. LOCKE for scrambler therapy for her peripheral polyneuropathy.  Patient denotes her bilateral stocking glove pattern below the ankle.  Patient has not been on any gabapentin or Lyrica in the past 72 hours.  She has not applied any creams salves or lotions to the bilateral lower extremities.    Patient states Numbness is a 1 out of 10 in the right arch only and the left 1/10. Last night there was bilateral numbness.  Patient is reporting more duration at bedtime of no symptoms.    Pain      Review of Systems   Neurological:  Positive for numbness.       Objective   Physical Exam  Musculoskeletal:        Legs:         Feet:        Assessment/Plan   Problem List Items Addressed This Visit             ICD-10-CM    Peripheral polyneuropathy - Primary G62.9   TREATMENT PLAN:  Day [ 8 ] of Scrambler Therapy.   Verbal consent obtained.  Session initiated by myself with proper electrode/lead placement to applicable dermatomes.   Patient tolerated treatment well .  Encouraged patient to keep a diary or log of symptoms to monitor for any changes in pain, sensation, etc.    Recommend follow-up as needed.       PROCEDURE NOTE:  Treatment [ 8 ] with Scrambler therapy was initiated by myself.   Verbal consent obtained from patient.  Therapy start time: [ 807 ]   Leads were applied to: [ bilateral L4 and L5 dermatomes ]  therapeutic dose :  [  RLE 1400  LLE 1400 ]     Patient tolerated treatment [ well  ] .  Pain reported at start of therapy session was about a [ 1/10 n/t ].  Pain reported at end of therapy session;  [ 1/10 n/t noticed left foot sensations ] .  Leads were removed and patient left the office without any difficulty.  60 min total scrambler treatment time.         Jose Guadalupe Carroll PA-C 01/29/25 7:54 AM

## 2025-01-30 ENCOUNTER — OFFICE VISIT (OUTPATIENT)
Dept: PAIN MEDICINE | Facility: CLINIC | Age: 75
End: 2025-01-30
Payer: MEDICARE

## 2025-01-30 VITALS
HEIGHT: 66 IN | BODY MASS INDEX: 25.07 KG/M2 | SYSTOLIC BLOOD PRESSURE: 142 MMHG | HEART RATE: 78 BPM | WEIGHT: 156 LBS | DIASTOLIC BLOOD PRESSURE: 70 MMHG | OXYGEN SATURATION: 98 % | RESPIRATION RATE: 18 BRPM

## 2025-01-30 DIAGNOSIS — G62.9 PERIPHERAL POLYNEUROPATHY: Primary | ICD-10-CM

## 2025-01-30 PROCEDURE — 1036F TOBACCO NON-USER: CPT | Performed by: PHYSICIAN ASSISTANT

## 2025-01-30 PROCEDURE — 99215 OFFICE O/P EST HI 40 MIN: CPT | Performed by: PHYSICIAN ASSISTANT

## 2025-01-30 PROCEDURE — 1125F AMNT PAIN NOTED PAIN PRSNT: CPT | Performed by: PHYSICIAN ASSISTANT

## 2025-01-30 PROCEDURE — 3077F SYST BP >= 140 MM HG: CPT | Performed by: PHYSICIAN ASSISTANT

## 2025-01-30 PROCEDURE — 1160F RVW MEDS BY RX/DR IN RCRD: CPT | Performed by: PHYSICIAN ASSISTANT

## 2025-01-30 PROCEDURE — 1159F MED LIST DOCD IN RCRD: CPT | Performed by: PHYSICIAN ASSISTANT

## 2025-01-30 PROCEDURE — 3078F DIAST BP <80 MM HG: CPT | Performed by: PHYSICIAN ASSISTANT

## 2025-01-30 PROCEDURE — 1157F ADVNC CARE PLAN IN RCRD: CPT | Performed by: PHYSICIAN ASSISTANT

## 2025-01-30 ASSESSMENT — LIFESTYLE VARIABLES
HOW MANY STANDARD DRINKS CONTAINING ALCOHOL DO YOU HAVE ON A TYPICAL DAY: PATIENT DOES NOT DRINK
HOW OFTEN DO YOU HAVE A DRINK CONTAINING ALCOHOL: NEVER
AUDIT-C TOTAL SCORE: 0
HOW OFTEN DO YOU HAVE SIX OR MORE DRINKS ON ONE OCCASION: NEVER
SKIP TO QUESTIONS 9-10: 1

## 2025-01-30 ASSESSMENT — ENCOUNTER SYMPTOMS
PAIN: 1
NUMBNESS: 1

## 2025-01-30 ASSESSMENT — PAIN - FUNCTIONAL ASSESSMENT: PAIN_FUNCTIONAL_ASSESSMENT: 0-10

## 2025-01-30 ASSESSMENT — PAIN SCALES - GENERAL
PAINLEVEL_OUTOF10: 3
PAINLEVEL_OUTOF10: 3

## 2025-01-30 ASSESSMENT — PAIN DESCRIPTION - DESCRIPTORS: DESCRIPTORS: NUMBNESS

## 2025-01-30 NOTE — PROGRESS NOTES
Subjective   Patient ID: Eli Zavala is a 74 y.o. female who presents for Pain (Scrambler therapy).  Patient is a 74-year-old female who was recommended by Dr. LOCKE for scrambler therapy for her peripheral polyneuropathy.  Patient denotes her bilateral stocking glove pattern below the ankle.  Patient has not been on any gabapentin or Lyrica in the past 72 hours.  She has not applied any creams salves or lotions to the bilateral lower extremities.    Patient states Numbness is a 1 out of 10 in the right arch only and the left 1/10. Last night there was bilateral numbness.  Patient is reporting more duration at bedtime of no symptoms.    Pain      Review of Systems   Neurological:  Positive for numbness.       Objective   Physical Exam  Musculoskeletal:        Legs:         Feet:        Assessment/Plan   Problem List Items Addressed This Visit             ICD-10-CM    Peripheral polyneuropathy - Primary G62.9   TREATMENT PLAN:  Day [ 9 ] of Scrambler Therapy.   Verbal consent obtained.  Session initiated by myself with proper electrode/lead placement to applicable dermatomes.   Patient tolerated treatment well .  Encouraged patient to keep a diary or log of symptoms to monitor for any changes in pain, sensation, etc.    Recommend follow-up as needed.       PROCEDURE NOTE:  Treatment [ 9 ] with Scrambler therapy was initiated by myself.   Verbal consent obtained from patient.  Therapy start time: [ 807 ]   Leads were applied to: [ bilateral L4 and L5 dermatomes ]  therapeutic dose :  [  RLE 1400  LLE 1400 ]     Patient tolerated treatment [ well  ] .  Pain reported at start of therapy session was about a [ 1/10 n/t ].  Pain reported at end of therapy session;  [ 0/10 n/t noticed left foot sensations ] .  Leads were removed and patient left the office without any difficulty.  60 min total scrambler treatment time.         Jose Guadalupe Carroll PA-C 01/30/25 7:57 AM

## 2025-01-31 ENCOUNTER — OFFICE VISIT (OUTPATIENT)
Dept: PAIN MEDICINE | Facility: CLINIC | Age: 75
End: 2025-01-31
Payer: MEDICARE

## 2025-01-31 VITALS
DIASTOLIC BLOOD PRESSURE: 80 MMHG | BODY MASS INDEX: 25.07 KG/M2 | HEIGHT: 66 IN | SYSTOLIC BLOOD PRESSURE: 126 MMHG | RESPIRATION RATE: 16 BRPM | WEIGHT: 156 LBS

## 2025-01-31 DIAGNOSIS — G62.9 PERIPHERAL POLYNEUROPATHY: Primary | ICD-10-CM

## 2025-01-31 PROCEDURE — 1160F RVW MEDS BY RX/DR IN RCRD: CPT | Performed by: PHYSICIAN ASSISTANT

## 2025-01-31 PROCEDURE — 3079F DIAST BP 80-89 MM HG: CPT | Performed by: PHYSICIAN ASSISTANT

## 2025-01-31 PROCEDURE — 3074F SYST BP LT 130 MM HG: CPT | Performed by: PHYSICIAN ASSISTANT

## 2025-01-31 PROCEDURE — 1159F MED LIST DOCD IN RCRD: CPT | Performed by: PHYSICIAN ASSISTANT

## 2025-01-31 PROCEDURE — 1125F AMNT PAIN NOTED PAIN PRSNT: CPT | Performed by: PHYSICIAN ASSISTANT

## 2025-01-31 PROCEDURE — 1157F ADVNC CARE PLAN IN RCRD: CPT | Performed by: PHYSICIAN ASSISTANT

## 2025-01-31 PROCEDURE — 99215 OFFICE O/P EST HI 40 MIN: CPT | Performed by: PHYSICIAN ASSISTANT

## 2025-01-31 PROCEDURE — 1036F TOBACCO NON-USER: CPT | Performed by: PHYSICIAN ASSISTANT

## 2025-01-31 ASSESSMENT — ENCOUNTER SYMPTOMS
PAIN: 1
NUMBNESS: 1

## 2025-01-31 ASSESSMENT — PAIN DESCRIPTION - DESCRIPTORS: DESCRIPTORS: NUMBNESS

## 2025-01-31 ASSESSMENT — PAIN SCALES - GENERAL
PAINLEVEL_OUTOF10: 2
PAINLEVEL_OUTOF10: 2

## 2025-01-31 ASSESSMENT — PAIN - FUNCTIONAL ASSESSMENT: PAIN_FUNCTIONAL_ASSESSMENT: 0-10

## 2025-01-31 NOTE — PROGRESS NOTES
Subjective   Patient ID: Eli Zavala is a 74 y.o. female who presents for No chief complaint on file..  Patient is a 74-year-old female who was recommended by Dr. LOCKE for scrambler therapy for her peripheral polyneuropathy.  Patient denotes her bilateral stocking glove pattern below the ankle.  Patient has not been on any gabapentin or Lyrica in the past 72 hours.  She has not applied any creams salves or lotions to the bilateral lower extremities.    Patient states Numbness is a 1 out of 10 in the right arch only and the left 1/10. Last night there was bilateral numbness.  Patient is reporting more duration at bedtime of no symptoms.    Pain      Review of Systems   Neurological:  Positive for numbness.       Objective   Physical Exam  Musculoskeletal:        Legs:         Feet:        Assessment/Plan   Problem List Items Addressed This Visit             ICD-10-CM    Peripheral polyneuropathy - Primary G62.9   TREATMENT PLAN:  Day [ 10 ] of Scrambler Therapy.   Verbal consent obtained.  Session initiated by myself with proper electrode/lead placement to applicable dermatomes.   Patient tolerated treatment well .  Encouraged patient to keep a diary or log of symptoms to monitor for any changes in pain, sensation, etc.    Recommend follow-up as needed.       PROCEDURE NOTE:  Treatment [ 10 ] with Scrambler therapy was initiated by myself.   Verbal consent obtained from patient.  Therapy start time: [ 809 ]   Leads were applied to: [ bilateral L4 and L5 dermatomes ]  therapeutic dose :  [  RLE 1200  LLE 1200 ]     Patient tolerated treatment [ well  ] .  Pain reported at start of therapy session was about a [ 1/10 n/t ].  Pain reported at end of therapy session;  [ 0/10 n/t noticed left foot sensations ] .  Leads were removed and patient left the office without any difficulty.  60 min total scrambler treatment time.         Jose Guadalupe Carroll PA-C 01/31/25 7:59 AM

## 2025-02-10 ENCOUNTER — APPOINTMENT (OUTPATIENT)
Dept: CARDIOLOGY | Facility: HOSPITAL | Age: 75
End: 2025-02-10
Payer: MEDICARE

## 2025-02-14 ENCOUNTER — OFFICE VISIT (OUTPATIENT)
Dept: OTOLARYNGOLOGY | Facility: CLINIC | Age: 75
End: 2025-02-14
Payer: MEDICARE

## 2025-02-14 VITALS — TEMPERATURE: 97.3 F | HEIGHT: 66 IN | BODY MASS INDEX: 25.18 KG/M2

## 2025-02-14 DIAGNOSIS — H91.93 SUBJECTIVE HEARING CHANGE OF BOTH EARS: ICD-10-CM

## 2025-02-14 DIAGNOSIS — L29.9 EAR ITCHING: ICD-10-CM

## 2025-02-14 DIAGNOSIS — L30.9 DERMATITIS OF EXTERNAL EAR: ICD-10-CM

## 2025-02-14 DIAGNOSIS — H93.8X3 SENSATION OF FULLNESS IN BOTH EARS: Primary | ICD-10-CM

## 2025-02-14 DIAGNOSIS — R09.81 NASAL CONGESTION: ICD-10-CM

## 2025-02-14 PROCEDURE — 1157F ADVNC CARE PLAN IN RCRD: CPT

## 2025-02-14 PROCEDURE — 1036F TOBACCO NON-USER: CPT

## 2025-02-14 PROCEDURE — 99213 OFFICE O/P EST LOW 20 MIN: CPT

## 2025-02-14 PROCEDURE — 1159F MED LIST DOCD IN RCRD: CPT

## 2025-02-14 RX ORDER — FLUOCINOLONE ACETONIDE 0.11 MG/ML
OIL AURICULAR (OTIC)
Qty: 1 ML | Refills: 11 | Status: SHIPPED | OUTPATIENT
Start: 2025-02-14

## 2025-02-14 RX ORDER — FLUTICASONE PROPIONATE 50 MCG
SPRAY, SUSPENSION (ML) NASAL
Qty: 48 G | Refills: 3 | Status: SHIPPED | OUTPATIENT
Start: 2025-02-14

## 2025-02-14 NOTE — PROGRESS NOTES
Chief Complaint   Patient presents with    Follow-up     LOV 9/23 DR CASIANO, H/O SEBORRHEIC DERMATITIS, FEELS LIKE SHE IS IN A TUNNEL, SOMETIMES POPS, BLOCKED     HPI:  Eli Zavala is a 74 y.o. female reports ears feeling blocked and decreased hearing since last Sunday.  She de denies preceding infection or virus, tinnitus, otalgia or otorrhea.  She does report recent dental work and some jaw clicking.  Denies swallowing complaints today.  She has not been using Flonase or her DermOtic but would like those refilled today.  She reports she uses hearing aids daily but they are not quite doing the job    She has a history of chronic dysphagia.  Itchy ears with dermatitis.   PMH:  Past Medical History:   Diagnosis Date    Atherosclerotic heart disease of native coronary artery without angina pectoris     Coronary heart disease    Chronic kidney disease, stage 3 unspecified (Multi)     Chronic kidney disease, stage III (moderate)    Hyperuricemia without signs of inflammatory arthritis and tophaceous disease     Asymptomatic hyperuricemia    Personal history of diseases of the blood and blood-forming organs and certain disorders involving the immune mechanism     History of anemia    Personal history of Hodgkin lymphoma     History of Hodgkin's lymphoma    Personal history of other diseases of the circulatory system     History of essential hypertension    Personal history of other diseases of the digestive system     History of gastroesophageal reflux (GERD)    Personal history of other diseases of the musculoskeletal system and connective tissue     Personal history of arthritis    Personal history of other endocrine, nutritional and metabolic disease     History of hypothyroidism    Personal history of other endocrine, nutritional and metabolic disease 05/03/2021    History of type 2 diabetes mellitus    Personal history of other endocrine, nutritional and metabolic disease     History of hypercholesterolemia     "Personal history of other endocrine, nutritional and metabolic disease     History of thyroid disease    Personal history of other specified conditions     History of shortness of breath    Type 2 diabetes mellitus with other diabetic kidney complication     Type 2 diabetes mellitus with renal manifestations     Past Surgical History:   Procedure Laterality Date    CHOLECYSTECTOMY  03/11/2014    Cholecystectomy Laparoscopic    INVASIVE VASCULAR PROCEDURE Left 9/20/2024    Procedure: Peripheral Fistulagram W/ Declot *labs on admit*;  Surgeon: Brandon Harrison MD;  Location: [facility];  Service: Vascular Surgery;  Laterality: Left;  *labs on admit*    MR HEAD ANGIO WO IV CONTRAST  8/16/2018    MR HEAD ANGIO WO IV CONTRAST [facility]    MR NECK ANGIO WO IV CONTRAST  8/16/2018    MR NECK ANGIO WO IV CONTRAST [facility]    OTHER SURGICAL HISTORY  09/16/2021    Hysterectomy    SINUS SURGERY  08/21/2014    Sinus Surgery    TONSILLECTOMY  08/21/2014    Tonsillectomy         Medications:     Current Outpatient Medications:     acetaminophen (Tylenol) 325 mg tablet, Take 2 tablets (650 mg) by mouth every 6 hours if needed., Disp: , Rfl:     BD AutoShield Duo Pen Needle 30 gauge x 3/16\" needle, MAY USE TO INJECT UP TO FOUR TIMES DAILY, Disp: , Rfl:     calcium carbonate (Tums) 200 mg calcium chewable tablet, Chew every 12 hours if needed., Disp: , Rfl:     clobetasol (Temovate) 0.05 % cream, Apply to affected vaginal area 2 times a week, Disp: 30 g, Rfl: 2    diclofenac (Voltaren) 0.1 % ophthalmic solution, 1 DROP IN AFFECTED EYES 4 TIMES A DAY AS NEEDED, Disp: , Rfl:     evolocumab (Repatha SureClick) 140 mg/mL injection, Inject 1 mL (140 mg) under the skin every 14 (fourteen) days., Disp: , Rfl:     FreeStyle Ron 2 [place] misc, USE AS DIRECTED, Disp: , Rfl:     FreeStyle Ron 2 Sensor kit, , Disp: , Rfl:     insulin glargine (Lantus) 100 unit/mL (3 mL) pen, Inject 10 Units under the skin once " daily at bedtime., Disp: , Rfl:     insulin lispro (HumaLOG KwikPen Insulin) 100 unit/mL injection, Inject under the skin. INJECT ACCORDING TO SLIDING SCALE ( 4 18 UNITS BEFORE MEALS), Disp: , Rfl:     ketoconazole (NIZOral) 2 % cream, Apply topically 2 times a day., Disp: , Rfl:     levothyroxine (Synthroid) 75 mcg tablet, Take 1 tablet (75 mcg) by mouth once daily in the morning. Take before meals., Disp: , Rfl:     nystatin (Mycostatin) 100,000 unit/gram powder, Apply to affected area 3 times daily, Disp: 60 g, Rfl: 0    nystatin (Mycostatin) cream, Apply 1 Application topically 2 times a day., Disp: 30 g, Rfl: 3    pantoprazole (ProtoNix) 40 mg EC tablet, Take 1 tablet (40 mg) by mouth once daily., Disp: , Rfl:     polyethylene glycol-electrolytes (polyethylene glycol) 420 gram solution, , Disp: , Rfl:     prednisoLONE acetate (Pred-Forte) 1 % ophthalmic suspension, prn, Disp: , Rfl:     Purelax 17 gram/dose powder, DISSOLVE 17 GM(S) IN 8 OUNCES OF WATER AND DRINK DAILY, Disp: , Rfl:     sennosides-docusate sodium (Senokot-S) 8.6-50 mg tablet, Take 1 tablet by mouth once daily., Disp: , Rfl:     warfarin (Coumadin) 2.5 mg tablet, Take 1 tablet (2.5 mg) by mouth. every Monday, Wednesday, Friday, Disp: , Rfl:     warfarin (Coumadin) 5 mg tablet, Take 1 tablet (5 mg) by mouth. every Tuesday, Thursday, Saturday, Sunday, Disp: , Rfl:     carvedilol (Coreg) 6.25 mg tablet, Take 1 tablet (6.25 mg) by mouth 2 times daily (morning and late afternoon). (Patient not taking: Reported on 2/14/2025), Disp: 180 tablet, Rfl: 3    chlorhexidine (Peridex) 0.12 % solution, RINSE AND SPIT TWICE A DAY 1/2 OZ FOR 30 SECONDS, START DAY AFTER SX (Patient not taking: Reported on 2/14/2025), Disp: , Rfl:     fluocinolone (DermOtic) 0.01 % ear drops, 4 drops to the affected ear/s twice daily as needed for itching. 1 bottle. 11 refills., Disp: 1 mL, Rfl: 11    fluticasone (Flonase) 50 mcg/actuation nasal spray, 2 sprays each nostril once  "daily, 3 bottles, 3 refills, Disp: 48 g, Rfl: 3    methocarbamol (Robaxin) 500 mg tablet, Take 1 tablet (500 mg) by mouth 2 times a day as needed for muscle spasms for up to 15 days., Disp: 30 tablet, Rfl: 0    tiZANidine (Zanaflex) 2 mg tablet, Take 1 tablet (2 mg) by mouth once daily as needed for muscle spasms for up to 15 days. (Patient not taking: Reported on 12/6/2024), Disp: 15 tablet, Rfl: 0     Allergies:  Allergies   Allergen Reactions    Levofloxacin Itching and Unknown    Atorvastatin Other and Unknown    Fentanyl Nausea/vomiting    Heparin (Porcine) Nausea/vomiting    Sulfamethoxazole-Trimethoprim Other and Unknown    Adhesive Tape-Silicones Unknown    Amoxicillin Unknown    Omeprazole Unknown    Thiopental Unknown    Sulfa (Sulfonamide Antibiotics) Itching and Unknown        ROS:  Review of systems normal unless stated otherwise in the HPI and/or PMH.    Physical Exam:  Temperature 36.3 °C (97.3 °F), height 1.676 m (5' 6\"). Body mass index is 25.18 kg/m².     GENERAL APPEARANCE: Well developed and well nourished.  Alert and oriented in no acute distress.  Normal vocal quality.      HEAD/FACE: No erythema or edema or facial tenderness.  Normal facial nerve function bilaterally.    EAR:       EXTERNAL: Left external auditory canals noted to have cerumen which caused obstruction of visualization of the tympanic membranes.  Removed via procedure under direct microscopic visualization using instrumentation without any difficulty or complication.  Normal pinnas and external auditory canals without lesion. Dry flaky skin present bilateraly.       MIDDLE EAR: Tympanic membranes intact and mobile with normal landmarks.  Middle ear space appears well aerated.       TUBE STATUS: N/A       MASTOID CAVITY: N/A       HEARING: Gross hearing assessment is within normal limits.      NOSE:       VISUALIZED USING: Anterior rhinoscopy with headlight and nasal speculum.       DORSUM: Midline, nontraumatic appearance.       " MUCOSA: Normal-appearing.       SECRETIONS: Normal.       SEPTUM: Midline and nonobstructing.       INFERIOR TURBINATES: Normal.       MIDDLE TURBINATES/MEATUS: N/A       BLEEDING: N/A         ORAL CAVITY/PHARYNX:       TEETH: Adequate dentition.       TONGUE: No mass or lesion.  Normal mobility.       FLOOR OF MOUTH: No mass or lesion.       PALATE: Normal hard palate, soft palate, and uvula.       OROPHARYNX: Normal without mass or lesion.       BUCCAL MUCOSA/GBS: Normal without mass or lesion.       LIPS: Normal.    LARYNX/HYPOPHARYNX/NASOPHARYNX: N/A    NECK: No palpable masses or abnormal adenopathy.  Trachea is midline.    THYROID: No thyromegaly or palpable nodule.    SALIVARY GLANDS: Normal bilateral parotid and submandibular glands by inspection and palpation.    TMJ's: Popping with movement.    NEURO: Cranial nerve exam grossly normal bilaterally.       Assessment/Plan   Eli was seen today for follow-up.  Diagnoses and all orders for this visit:  Sensation of fullness in both ears (Primary)  Ear itching  -     fluocinolone (DermOtic) 0.01 % ear drops; 4 drops to the affected ear/s twice daily as needed for itching. 1 bottle. 11 refills.  Dermatitis of external ear  Subjective hearing change of both ears  Nasal congestion  -     fluticasone (Flonase) 50 mcg/actuation nasal spray; 2 sprays each nostril once daily, 3 bottles, 3 refills     Reassurances given to Eli normal ear exam today.  We did talk about additional causes for ear pain such as neck and jaw pain, she does report both. I did ask her to update her audiogram and she is interested in new hearing aids.    No follow-ups on file.     Ju Gunter, APRN-CNP

## 2025-02-21 ENCOUNTER — APPOINTMENT (OUTPATIENT)
Dept: AUDIOLOGY | Facility: CLINIC | Age: 75
End: 2025-02-21
Payer: MEDICARE

## 2025-02-25 DIAGNOSIS — N18.6 ESRD (END STAGE RENAL DISEASE) ON DIALYSIS (MULTI): Primary | ICD-10-CM

## 2025-02-25 DIAGNOSIS — Z99.2 ESRD (END STAGE RENAL DISEASE) ON DIALYSIS (MULTI): Primary | ICD-10-CM

## 2025-02-26 ENCOUNTER — ANTICOAGULATION - WARFARIN VISIT (OUTPATIENT)
Dept: CARDIOLOGY | Facility: HOSPITAL | Age: 75
End: 2025-02-26
Payer: MEDICARE

## 2025-03-10 ENCOUNTER — APPOINTMENT (OUTPATIENT)
Dept: CARDIOLOGY | Facility: CLINIC | Age: 75
End: 2025-03-10
Payer: MEDICARE

## 2025-03-12 ENCOUNTER — APPOINTMENT (OUTPATIENT)
Dept: AUDIOLOGY | Facility: CLINIC | Age: 75
End: 2025-03-12
Payer: MEDICARE

## 2025-03-14 ENCOUNTER — APPOINTMENT (OUTPATIENT)
Dept: CARDIOLOGY | Facility: CLINIC | Age: 75
End: 2025-03-14
Payer: MEDICARE

## 2025-03-26 ENCOUNTER — ANTICOAGULATION - WARFARIN VISIT (OUTPATIENT)
Dept: CARDIOLOGY | Facility: HOSPITAL | Age: 75
End: 2025-03-26
Payer: MEDICARE

## 2025-03-26 ENCOUNTER — OFFICE VISIT (OUTPATIENT)
Dept: VASCULAR SURGERY | Facility: CLINIC | Age: 75
End: 2025-03-26
Payer: MEDICARE

## 2025-03-26 VITALS
SYSTOLIC BLOOD PRESSURE: 111 MMHG | WEIGHT: 155 LBS | BODY MASS INDEX: 25.02 KG/M2 | HEART RATE: 84 BPM | DIASTOLIC BLOOD PRESSURE: 64 MMHG

## 2025-03-26 DIAGNOSIS — Z01.818 ENCOUNTER FOR OTHER PREPROCEDURAL EXAMINATION: ICD-10-CM

## 2025-03-26 DIAGNOSIS — N18.6 ESRD (END STAGE RENAL DISEASE) (MULTI): Primary | ICD-10-CM

## 2025-03-26 DIAGNOSIS — I48.20 CHRONIC ATRIAL FIBRILLATION (MULTI): Primary | ICD-10-CM

## 2025-03-26 LAB
POC INR: 1.1
POC PROTHROMBIN TIME: NORMAL

## 2025-03-26 PROCEDURE — 3078F DIAST BP <80 MM HG: CPT | Performed by: STUDENT IN AN ORGANIZED HEALTH CARE EDUCATION/TRAINING PROGRAM

## 2025-03-26 PROCEDURE — 1125F AMNT PAIN NOTED PAIN PRSNT: CPT | Performed by: STUDENT IN AN ORGANIZED HEALTH CARE EDUCATION/TRAINING PROGRAM

## 2025-03-26 PROCEDURE — 85610 PROTHROMBIN TIME: CPT | Mod: QW

## 2025-03-26 PROCEDURE — 99213 OFFICE O/P EST LOW 20 MIN: CPT | Performed by: STUDENT IN AN ORGANIZED HEALTH CARE EDUCATION/TRAINING PROGRAM

## 2025-03-26 PROCEDURE — 99203 OFFICE O/P NEW LOW 30 MIN: CPT | Performed by: STUDENT IN AN ORGANIZED HEALTH CARE EDUCATION/TRAINING PROGRAM

## 2025-03-26 PROCEDURE — 1157F ADVNC CARE PLAN IN RCRD: CPT | Performed by: STUDENT IN AN ORGANIZED HEALTH CARE EDUCATION/TRAINING PROGRAM

## 2025-03-26 PROCEDURE — 3074F SYST BP LT 130 MM HG: CPT | Performed by: STUDENT IN AN ORGANIZED HEALTH CARE EDUCATION/TRAINING PROGRAM

## 2025-03-26 PROCEDURE — 99211 OFF/OP EST MAY X REQ PHY/QHP: CPT | Performed by: FAMILY MEDICINE

## 2025-03-26 PROCEDURE — 1159F MED LIST DOCD IN RCRD: CPT | Performed by: STUDENT IN AN ORGANIZED HEALTH CARE EDUCATION/TRAINING PROGRAM

## 2025-03-26 RX ORDER — MIDODRINE HYDROCHLORIDE 5 MG/1
5 TABLET ORAL 3 TIMES DAILY
COMMUNITY
Start: 2024-12-26 | End: 2025-05-27

## 2025-03-26 ASSESSMENT — ENCOUNTER SYMPTOMS
LOSS OF SENSATION IN FEET: 1
OCCASIONAL FEELINGS OF UNSTEADINESS: 0
DEPRESSION: 0

## 2025-03-26 ASSESSMENT — PATIENT HEALTH QUESTIONNAIRE - PHQ9
10. IF YOU CHECKED OFF ANY PROBLEMS, HOW DIFFICULT HAVE THESE PROBLEMS MADE IT FOR YOU TO DO YOUR WORK, TAKE CARE OF THINGS AT HOME, OR GET ALONG WITH OTHER PEOPLE: NOT DIFFICULT AT ALL
1. LITTLE INTEREST OR PLEASURE IN DOING THINGS: SEVERAL DAYS
2. FEELING DOWN, DEPRESSED OR HOPELESS: NOT AT ALL
SUM OF ALL RESPONSES TO PHQ9 QUESTIONS 1 AND 2: 1

## 2025-03-26 ASSESSMENT — PAIN SCALES - GENERAL: PAINLEVEL_OUTOF10: 9

## 2025-03-26 NOTE — PROGRESS NOTES
Patient identification verified with 2 identifiers.    Location: [facility] - 1st Floor Anticoagulation Clinic [address] [id number]    Referring Physician: DR Bonilla  Enrollment/ Re-enrollment date:    INR Goal: 2.0-3.0  INR monitoring is per [facility] protocol.  Anticoagulation Medication: warfarin  Indication: AF    Subjective   Bleeding signs/symptoms:      Bruising:     Major bleeding event:    Thrombosis signs/symptoms:    Thromboembolic event:    Missed doses: Yes  had missed multiple doses especially on dialysis days  Extra doses:    Medication changes:    Dietary changes:    Change in health:    Change in activity:    Alcohol:    Other concerns:      Upcoming Procedures:  Does the Patient Have any upcoming procedures that require interruption in anticoagulation therapy? no  Does the patient require bridging? no      Anticoagulation Summary  As of 3/26/2025      INR goal:  2.0-3.0   TTR:  14.4% (1 y)   INR used for dosin.10 (3/26/2025)   Weekly warfarin total:  22.5 mg               Assessment/Plan   Subtherapeutic     1. New dose: no change    2. Next INR: 1 month      Education provided to patient during the visit:  Patient instructed to call in interim with questions, concerns and changes.

## 2025-03-26 NOTE — PROGRESS NOTES
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 right-handed female with ESRD who has a left brachiocephalic fistula placed by Dr. Curiel over a year ago. This appears to have been superficialized at the time based on the scar. 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 She reports three different venograms in the last month, most recently with right femoral vein access which was reportedly unsuccessful in crossing the central venous occlusions. She had her right chest wall dialysis catheter removed some months ago.    At dialysis she is able to complete full sessions unless she becomes hypotensive at the end of the session. She takes midodrine before and during dialysis. She reports no high venous pressure alarms but notes low arterial alarms intermittently. She has no prolonged bleeding after dialysis.     She brought records with her that indicate she underwent \"unsuccessful declot\" on 3/14 by Dr. Garsia at [facility] that showed \"left brachiocephalic, subclavian, and axillary artery vein occlusions\".     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     Meds:   Current Outpatient Medications on File Prior to Visit   Medication Sig Dispense Refill    acetaminophen (Tylenol) 325 mg tablet Take 2 tablets (650 mg) by mouth every 6 hours if needed.      BD AutoShield Duo Pen Needle 30 gauge x 3/16\" needle MAY USE TO INJECT UP TO FOUR TIMES DAILY      calcium carbonate (Tums) 200 mg calcium chewable tablet Chew every 12 hours if needed.      diclofenac (Voltaren) 0.1 % ophthalmic solution 1 DROP IN AFFECTED EYES 4 TIMES A DAY AS NEEDED      evolocumab (Repatha " SureClick) 140 mg/mL injection Inject 1 mL (140 mg) under the skin every 14 (fourteen) days.      fluocinolone (DermOtic) 0.01 % ear drops 4 drops to the affected ear/s twice daily as needed for itching. 1 bottle. 11 refills. 1 mL 11    fluticasone (Flonase) 50 mcg/actuation nasal spray 2 sprays each nostril once daily, 3 bottles, 3 refills 48 g 3    FreeStyle Ron 2 [place] [facility] USE AS DIRECTED      FreeStyle Ron 2 Sensor kit       insulin glargine (Lantus) 100 unit/mL (3 mL) pen Inject 10 Units under the skin once daily at bedtime.      insulin lispro (HumaLOG KwikPen Insulin) 100 unit/mL injection Inject under the skin. INJECT ACCORDING TO SLIDING SCALE ( 4 18 UNITS BEFORE MEALS)      levothyroxine (Synthroid) 75 mcg tablet Take 1 tablet (75 mcg) by mouth once daily in the morning. Take before meals.      midodrine (Proamatine) 5 mg tablet Take 1 tablet (5 mg) by mouth 3 times a day.      nystatin (Mycostatin) 100,000 unit/gram powder Apply to affected area 3 times daily 60 g 0    nystatin (Mycostatin) cream Apply 1 Application topically 2 times a day. 30 g 3    pantoprazole (ProtoNix) 40 mg EC tablet Take 1 tablet (40 mg) by mouth once daily.      polyethylene glycol-electrolytes (polyethylene glycol) 420 gram solution       prednisoLONE acetate (Pred-Forte) 1 % ophthalmic suspension prn      Purelax 17 gram/dose powder DISSOLVE 17 GM(S) IN 8 OUNCES OF WATER AND DRINK DAILY      sennosides-docusate sodium (Senokot-S) 8.6-50 mg tablet Take 1 tablet by mouth once daily.      warfarin (Coumadin) 2.5 mg tablet Take 1 tablet (2.5 mg) by mouth. every Monday, Wednesday, Friday      warfarin (Coumadin) 5 mg tablet Take 1 tablet (5 mg) by mouth. every Tuesday, Thursday, Saturday, Sunday      carvedilol (Coreg) 6.25 mg tablet Take 1 tablet (6.25 mg) by mouth 2 times daily (morning and late afternoon). (Patient not taking: Reported on 2/14/2025) 180 tablet 3    chlorhexidine (Peridex) 0.12 % solution RINSE AND SPIT TWICE  A DAY 1/2 OZ FOR 30 SECONDS, START DAY AFTER SX (Patient not taking: Reported on 2/14/2025)      clobetasol (Temovate) 0.05 % cream Apply to affected vaginal area 2 times a week (Patient not taking: Reported on 3/26/2025) 30 g 2    ketoconazole (NIZOral) 2 % cream Apply topically 2 times a day. (Patient not taking: Reported on 3/26/2025)      methocarbamol (Robaxin) 500 mg tablet Take 1 tablet (500 mg) by mouth 2 times a day as needed for muscle spasms for up to 15 days. 30 tablet 0    tiZANidine (Zanaflex) 2 mg tablet Take 1 tablet (2 mg) by mouth once daily as needed for muscle spasms for up to 15 days. (Patient not taking: Reported on 12/6/2024) 15 tablet 0     No current facility-administered medications on file prior to visit.        Allergies:   Allergies   Allergen Reactions    Levofloxacin Itching and Unknown    Atorvastatin Other and Unknown    Fentanyl Nausea/vomiting    Heparin (Porcine) Nausea/vomiting    Sulfamethoxazole-Trimethoprim Other and Unknown    Adhesive Tape-Silicones Unknown    Amoxicillin Unknown    Omeprazole Unknown    Thiopental Unknown    Sulfa (Sulfonamide Antibiotics) Itching and Unknown       SH:    Social Drivers of Health     Tobacco Use: Low Risk  (3/26/2025)    Patient History     Smoking Tobacco Use: Never     Smokeless Tobacco Use: Never     Passive Exposure: Never   Alcohol Use: Not At Risk (1/30/2025)    AUDIT-C     Frequency of Alcohol Consumption: Never     Average Number of Drinks: Patient does not drink     Frequency of Binge Drinking: Never   Financial Resource Strain: Low Risk  (11/1/2022)    Received from [facility]    Overall Financial Resource Strain (CARDIA)     Difficulty of Paying Living Expenses: Not hard at all   Food Insecurity: No Food Insecurity (11/1/2022)    Received from [facility]    Hunger Vital Sign     Worried About Running Out of Food in the Last Year: Never true     Ran Out of Food in the Last Year:  Never true   Transportation Needs: No Transportation Needs (2022)    Received from [facility]    PRAPARE - Transportation     Lack of Transportation (Medical): No     Lack of Transportation (Non-Medical): No   Physical Activity: Not on file   Stress: Not on file   Social Connections: Not on file   Intimate Partner Violence: Not on file   Depression: Not at risk (3/26/2025)    PHQ-2     PHQ-2 Score: 1   Housing Stability: Low Risk  (2022)    Received from [facility]    Housing Stability Vital Sign     Unable to Pay for Housing in the Last Year: No     Number of Places Lived in the Last Year: 1     Unstable Housing in the Last Year: No   Utilities: Not on file   Digital Equity: Not on file   Health Literacy: Not on file        FH:  No family history on file.     ROS:  Review of Systems   A complete, 10-point review of systems was completed and negative except as noted above.     Objective:  Vitals:  Vitals:    25 0931   BP: 111/64   Pulse: 84        Exam:  Constitutional: no acute distress  Neuro: awake, alert, oriented; no gross deficits noted   HEENT: No deformities, no scleral icterus   Cardiac: regular rate  Pulmonary: unlabored respirations on room air  Abdomen: soft, non-tender, non-distended  Skin: warm and dry; left arm longitudinal scar from proximal to distal arm overlying the fistula. Over the fistula there is no erythema or evidence of skin thinning  Extremities: no peripheral edema noted. In the LUE there is a brachiocephalic fistula with significant aneurysmal change at the proximal fistula with visible pulsation. There is a thrill in the fistula as well as pulsations  MSK: motor and sensory intact in all extremities  Vascular:   Radial: R: palpable. L: non-palpable.         [id number] central venogram      Assessment & Plan:  Eli Zavala is 74 y.o. female with history of ESRD on HD and left arm fistula with known central venous  occlusions who presents with aneurysmal dilatation of the proximal fistula without overlying skin changes and no signs of neurovascular compromise to the extremity. The fistula is pulsatile but functions well without significant issues at dialysis and the patient is generally able to complete full sessions without high pressure alarms or prolonged bleeding.     Plan:   - monitor aneurysm and skin overlying fistula aneurysm, patient counseled re: concerning signs of skin thinning/breakdown and return precautions  - ok to continue using fistula as long as there are no high venous pressure alarms or prolonged bleeding; instructed to call the office for evaluation if that is the case  - obtain left arm HD access duplex for baseline  - obtain right arm vein mapping for possible future access creation    Seen with Dr. Graham De Leon MD, PhD  Vascular Surgery, PGY3

## 2025-03-31 ENCOUNTER — ANCILLARY PROCEDURE (OUTPATIENT)
Dept: VASCULAR MEDICINE | Facility: CLINIC | Age: 75
End: 2025-03-31
Payer: MEDICARE

## 2025-03-31 ENCOUNTER — APPOINTMENT (OUTPATIENT)
Dept: VASCULAR MEDICINE | Facility: CLINIC | Age: 75
End: 2025-03-31
Payer: MEDICARE

## 2025-03-31 DIAGNOSIS — N18.6 ESRD (END STAGE RENAL DISEASE) (MULTI): ICD-10-CM

## 2025-03-31 DIAGNOSIS — Z01.818 ENCOUNTER FOR OTHER PREPROCEDURAL EXAMINATION: ICD-10-CM

## 2025-03-31 PROCEDURE — 93986 DUP-SCAN HEMO COMPL UNI STD: CPT | Performed by: SURGERY

## 2025-03-31 PROCEDURE — 93990 DOPPLER FLOW TESTING: CPT

## 2025-03-31 PROCEDURE — 93990 DOPPLER FLOW TESTING: CPT | Performed by: SURGERY

## 2025-04-01 DIAGNOSIS — R10.9 UNSPECIFIED ABDOMINAL PAIN: ICD-10-CM

## 2025-04-02 ENCOUNTER — APPOINTMENT (OUTPATIENT)
Dept: AUDIOLOGY | Facility: CLINIC | Age: 75
End: 2025-04-02
Payer: MEDICARE

## 2025-04-04 ENCOUNTER — APPOINTMENT (OUTPATIENT)
Dept: VASCULAR MEDICINE | Facility: CLINIC | Age: 75
End: 2025-04-04
Payer: MEDICARE

## 2025-04-08 ENCOUNTER — APPOINTMENT (OUTPATIENT)
Dept: VASCULAR SURGERY | Facility: CLINIC | Age: 75
End: 2025-04-08
Payer: MEDICARE

## 2025-04-11 DIAGNOSIS — E78.49 OTHER HYPERLIPIDEMIA: ICD-10-CM

## 2025-04-11 DIAGNOSIS — I25.10 ATHEROSCLEROSIS OF NATIVE CORONARY ARTERY OF NATIVE HEART WITHOUT ANGINA PECTORIS: ICD-10-CM

## 2025-04-11 NOTE — TELEPHONE ENCOUNTER
Please send the attached prescription to the patient's pharmacy as soon as possible. Thank you!    Requested Prescriptions     Pending Prescriptions Disp Refills    evolocumab (Repatha SureClick) 140 mg/mL injection 6 mL 3     Sig: Inject 1 mL (140 mg) under the skin every 14 (fourteen) days.

## 2025-04-15 RX ORDER — EVOLOCUMAB 140 MG/ML
140 INJECTION, SOLUTION SUBCUTANEOUS
Qty: 6 ML | Refills: 3 | Status: SHIPPED | OUTPATIENT
Start: 2025-04-15 | End: 2026-04-15

## 2025-04-16 ENCOUNTER — APPOINTMENT (OUTPATIENT)
Dept: PAIN MEDICINE | Facility: CLINIC | Age: 75
End: 2025-04-16
Payer: MEDICARE

## 2025-04-18 ENCOUNTER — ANTICOAGULATION - WARFARIN VISIT (OUTPATIENT)
Dept: CARDIOLOGY | Facility: HOSPITAL | Age: 75
End: 2025-04-18
Payer: MEDICARE

## 2025-04-18 ENCOUNTER — OFFICE VISIT (OUTPATIENT)
Dept: URGENT CARE | Age: 75
End: 2025-04-18
Payer: MEDICARE

## 2025-04-18 VITALS
RESPIRATION RATE: 19 BRPM | SYSTOLIC BLOOD PRESSURE: 119 MMHG | TEMPERATURE: 97.7 F | OXYGEN SATURATION: 99 % | DIASTOLIC BLOOD PRESSURE: 58 MMHG | BODY MASS INDEX: 26.57 KG/M2 | WEIGHT: 165.34 LBS | HEIGHT: 66 IN | HEART RATE: 67 BPM

## 2025-04-18 DIAGNOSIS — L89.321 PRESSURE INJURY OF LEFT BUTTOCK, STAGE 1: Primary | ICD-10-CM

## 2025-04-18 PROCEDURE — 1159F MED LIST DOCD IN RCRD: CPT | Performed by: SURGERY

## 2025-04-18 PROCEDURE — 99213 OFFICE O/P EST LOW 20 MIN: CPT | Performed by: SURGERY

## 2025-04-18 PROCEDURE — 1036F TOBACCO NON-USER: CPT | Performed by: SURGERY

## 2025-04-18 PROCEDURE — 3078F DIAST BP <80 MM HG: CPT | Performed by: SURGERY

## 2025-04-18 PROCEDURE — 1160F RVW MEDS BY RX/DR IN RCRD: CPT | Performed by: SURGERY

## 2025-04-18 PROCEDURE — 3074F SYST BP LT 130 MM HG: CPT | Performed by: SURGERY

## 2025-04-18 PROCEDURE — 1157F ADVNC CARE PLAN IN RCRD: CPT | Performed by: SURGERY

## 2025-04-18 RX ORDER — MUPIROCIN 20 MG/G
OINTMENT TOPICAL
Qty: 22 G | Refills: 0 | Status: SHIPPED | OUTPATIENT
Start: 2025-04-18 | End: 2025-04-28

## 2025-04-18 NOTE — PROGRESS NOTES
Chief Complaint   Patient presents with    Blister     Has blister on lower-back   Onset 1 week ago       Physical Exam:     On the left buttock at the gluteal cleft there is a 2.5 cm area of discoloration and mild skin breakdown that doesn't go into the dermis.         Encounter Diagnosis   Name Primary?    Pressure injury of left buttock, stage 1 Yes        Medical Decision Making & Plan:   Bactroban   Hydrocolloid dressing   Follow up PCP    Home        04/18/25 at 8:50 AM - Alejandrina Flood, DO

## 2025-04-18 NOTE — PROGRESS NOTES
Patient called to state she is having a procedure on her fistula on 4/21, she is currently off warfarin. Will call after procedure to reschedule appointment

## 2025-04-23 ENCOUNTER — HOSPITAL ENCOUNTER (OUTPATIENT)
Dept: RADIOLOGY | Facility: HOSPITAL | Age: 75
End: 2025-04-23
Payer: MEDICARE

## 2025-04-23 DIAGNOSIS — R52 PAIN, UNSPECIFIED: Primary | ICD-10-CM

## 2025-04-25 ENCOUNTER — APPOINTMENT (OUTPATIENT)
Dept: RADIOLOGY | Facility: HOSPITAL | Age: 75
End: 2025-04-25
Payer: MEDICARE

## 2025-05-13 NOTE — PROGRESS NOTES
[facility]  Follow Up Office Visit Note 2025    Patient Information: Eli Zavala, MRN: [id number], : 1950   Primary Care/Referring Physician: Jese Bonilla MD, [id number] / [place] [address]     Chief Complaint: Neck pain, low back pain, bilateral foot pain    Interval History: Ms. Zavala presents today for follow up. At her last visit I performed TPI's to her neck and planned for additional lumbar mbb's, which were never scheduled    Today she reports no major changes since last seen. Her neck pain remains fairly manageable since last seen, but she continues to have significant low back pain which makes standing and walking difficult. She would like to proceed with lumbar mbb's as previously planned. She is not sure why she never scheduled the 2nd set    She also underwent Scrambler therapy in the interim which continues to provide noticeable improvement in the neuropathy symptoms in her feet    Brief History of Pain: Ms. Eli Zavala is a 74 y.o. female with a PMHx of HTN, HLD, T2DM (A1c 9.0%), CAD, Afib (on Warfarin), CVA, ESRD (on IHD on Tues, Thurs, Sat)< hx of DVT, GERD, COPD, temporal arteritis who presents for neck and shoulder pain, low back pain, and bilateral foot pain     Currently her worst pain is in the neck/shoulders. She reports onset of pain approximately 2 weeks ago with no obvious inciting event. She describes pain bilaterally in the neck and shoulders, with significant pain with rotation in either direction as well as flexion and extension. Her neck feels very stiff. She denies any numbness, tingling, or weakness in her arms. She is using a heating pad on the neck with minimal relief. She tried lidocaine patches with no benefit    She also reports low back pain which started many years ago. She describes primarily axial low back pain which worsens significantly when she tries to straighten her back after bending forward. She does get some pain relief  "from lying down flat and sitting. She denies any pain radiating down her legs. She uses a walker at home to get around.     She also reports neuropathy in her feet since chemotherapy around 10 years ago. This is described as a \"stinging\" pain and is significantly worse at night, making it difficult to sleep    Current Pain Medications: OTC Tylenol  Previously Tried Pain Medications: Tizanidine - lowered her BP, Methocarbamol - lowered her BP    Relevant Surgeries: Denies lumbar or cervical spine surgery. She had a right ankle ORIF due to fracture  Injections: She reports undergoing some type of low back injections in the past (details unclear, ?SI joint injections)  Physical/Occupational Therapy: She was referred to PT for her neck but hasn't started this yet    Medications:   Current Outpatient Medications   Medication Instructions    acetaminophen (TYLENOL) 650 mg, Every 6 hours PRN    apixaban (ELIQUIS) 10 mg, 2 times daily    BD AutoShield Duo Pen Needle 30 gauge x 3/16\" needle MAY USE TO INJECT UP TO FOUR TIMES DAILY    calcium carbonate (Tums) 200 mg calcium chewable tablet Every 12 hours PRN    carvedilol (COREG) 6.25 mg, oral, 2 times daily (morning and late afternoon)    chlorhexidine (Peridex) 0.12 % solution     clobetasol (Temovate) 0.05 % cream Apply to affected vaginal area 2 times a week    diclofenac (Voltaren) 0.1 % ophthalmic solution 1 DROP IN AFFECTED EYES 4 TIMES A DAY AS NEEDED    fluocinolone (DermOtic) 0.01 % ear drops 4 drops to the affected ear/s twice daily as needed for itching. 1 bottle. 11 refills.    fluticasone (Flonase) 50 mcg/actuation nasal spray 2 sprays each nostril once daily, 3 bottles, 3 refills    FreeStyle Ron 2 [facility] USE AS DIRECTED    FreeStyle Ron 2 Sensor kit     insulin glargine (LANTUS) 10 Units, Nightly    insulin lispro (HumaLOG KwikPen Insulin) 100 unit/mL injection Inject under the skin. INJECT ACCORDING TO SLIDING SCALE ( 4 18 UNITS BEFORE MEALS)    " ketoconazole (NIZOral) 2 % cream 2 times daily    levothyroxine (SYNTHROID) 75 mcg, Daily before breakfast    methocarbamol (ROBAXIN) 500 mg, oral, 2 times daily PRN    midodrine (PROAMATINE) 5 mg, 3 times daily    nystatin (Mycostatin) 100,000 unit/gram powder Apply to affected area 3 times daily    nystatin (Mycostatin) cream 1 Application, Topical, 2 times daily    pantoprazole (PROTONIX) 40 mg, Daily    polyethylene glycol-electrolytes (polyethylene glycol) 420 gram solution     prednisoLONE acetate (Pred-Forte) 1 % ophthalmic suspension prn    Purelax 17 gram/dose powder DISSOLVE 17 GM(S) IN 8 OUNCES OF WATER AND DRINK DAILY    Repatha SureClick 140 mg, subcutaneous, Every 14 days    sennosides-docusate sodium (Senokot-S) 8.6-50 mg tablet 1 tablet, Daily RT    warfarin (COUMADIN) 2.5 mg    warfarin (COUMADIN) 5 mg      Allergies:   Allergies   Allergen Reactions    Levofloxacin Itching and Unknown    Penicillins Rash    Atorvastatin Other and Unknown    Fentanyl Nausea/vomiting    Heparin (Porcine) Nausea/vomiting    Sulfamethoxazole-Trimethoprim Other and Unknown    Adhesive Tape-Silicones Unknown    Amoxicillin Unknown    Omeprazole Unknown    Thiopental Unknown    Sulfa (Sulfonamide Antibiotics) Itching and Unknown       Past Medical & Surgical History:  Past Medical History:   Diagnosis Date    Atherosclerotic heart disease of native coronary artery without angina pectoris     Coronary heart disease    Chronic kidney disease, stage 3 unspecified (Multi)     Chronic kidney disease, stage III (moderate)    Hyperuricemia without signs of inflammatory arthritis and tophaceous disease     Asymptomatic hyperuricemia    Personal history of diseases of the blood and blood-forming organs and certain disorders involving the immune mechanism     History of anemia    Personal history of Hodgkin lymphoma     History of Hodgkin's lymphoma    Personal history of other diseases of the circulatory system     History of  essential hypertension    Personal history of other diseases of the digestive system     History of gastroesophageal reflux (GERD)    Personal history of other diseases of the musculoskeletal system and connective tissue     Personal history of arthritis    Personal history of other endocrine, nutritional and metabolic disease     History of hypothyroidism    Personal history of other endocrine, nutritional and metabolic disease 05/03/2021    History of type 2 diabetes mellitus    Personal history of other endocrine, nutritional and metabolic disease     History of hypercholesterolemia    Personal history of other endocrine, nutritional and metabolic disease     History of thyroid disease    Personal history of other specified conditions     History of shortness of breath    Type 2 diabetes mellitus with other diabetic kidney complication     Type 2 diabetes mellitus with renal manifestations      Past Surgical History:   Procedure Laterality Date    CHOLECYSTECTOMY  03/11/2014    Cholecystectomy Laparoscopic    INVASIVE VASCULAR PROCEDURE Left 9/20/2024    Procedure: Peripheral Fistulagram W/ Declot *labs on admit*;  Surgeon: Brandon Harrison MD;  Location: [facility];  Service: Vascular Surgery;  Laterality: Left;  *labs on admit*    MR HEAD ANGIO WO IV CONTRAST  8/16/2018    MR HEAD ANGIO WO IV CONTRAST [facility]    MR NECK ANGIO WO IV CONTRAST  8/16/2018    MR NECK ANGIO WO IV CONTRAST [facility]    OTHER SURGICAL HISTORY  09/16/2021    Hysterectomy    SINUS SURGERY  08/21/2014    Sinus Surgery    TONSILLECTOMY  08/21/2014    Tonsillectomy       No family history on file.  Social History     Socioeconomic History    Marital status:      Spouse name: Not on file    Number of children: Not on file    Years of education: Not on file    Highest education level: Not on file   Occupational History    Not on file   Tobacco Use    Smoking status: Never     Passive exposure: Never     Smokeless tobacco: Never   Vaping Use    Vaping status: Not on file   Substance and Sexual Activity    Alcohol use: Not Currently    Drug use: Never    Sexual activity: Defer   Other Topics Concern    Not on file   Social History Narrative    Not on file     Social Drivers of Health     Financial Resource Strain: Low Risk  (11/1/2022)    Received from [facility]    Overall Financial Resource Strain (CARDIA)     Difficulty of Paying Living Expenses: Not hard at all   Food Insecurity: No Food Insecurity (11/1/2022)    Received from [facility]    Hunger Vital Sign     Worried About Running Out of Food in the Last Year: Never true     Ran Out of Food in the Last Year: Never true   Transportation Needs: No Transportation Needs (11/1/2022)    Received from [facility]    PRAPARE - Transportation     Lack of Transportation (Medical): No     Lack of Transportation (Non-Medical): No   Physical Activity: Not on file   Stress: Not on file   Social Connections: Not on file   Intimate Partner Violence: Not on file   Housing Stability: Low Risk  (11/1/2022)    Received from [facility]    Housing Stability Vital Sign     Unable to Pay for Housing in the Last Year: No     Number of Places Lived in the Last Year: 1     Unstable Housing in the Last Year: No       Problems, Past medical history, past surgical history, Medications, allergies, social and family history reviewed and as per the electronic medical record from today's encounter    Review of Systems:  CONST: No fever, chills, fatigue, weight changes  EYES: No loss of vision  ENT: No hearing loss, tinnitus  CV: No chest pain, palpitations  RESP: No dyspnea, shortness of breath, cough  GI: No stool incontinence, nausea, vomiting  : No urinary incontinence  MSK: No joint swelling  SKIN: No rash, no hives  NEURO: No headache, dizziness  PSYCH: No anxiety, depression  HEM/LYMPH: Reports easy bruising    Physical Exam:  Vitals: /54   Pulse 78    "Resp 18   Ht 1.676 m (5' 6\")   Wt 74.8 kg (165 lb)   LMP  (LMP Unknown)   SpO2 99%   BMI 26.63 kg/m²   General: No apparent distress. Alert, appropriate, oriented x 3. Mood and affect normal. Speaking in full sentences.  HENT: Normocephalic, atraumatic. Hearing intact.  Eyes: Extraocular movements grossly intact. Pupils equal and round.   Neck: Supple, trachea midline.  Lungs: Symmetric respiratory excursion on visual exam, nonlabored breathing.  Extremities: No clubbing, cyanosis, or edema noted in arms or legs.  Skin: No rashes, lesions, alopecia noted on back or extremities.   Neuro: Alert and appropriate. Using a wheelchair. Bulk and tone within normal limits.    Laboratory Data:  The following laboratory data were reviewed during this visit:   Lab Results   Component Value Date    WBC 4.1 (L) 12/11/2024    RBC 3.83 (L) 12/11/2024    HGB 13.4 12/11/2024    HCT 41.5 12/11/2024     12/11/2024      Lab Results   Component Value Date    INR 1.10 03/26/2025    INR 1.30 01/27/2025    INR 2.10 11/18/2024     Lab Results   Component Value Date    CREATININE 3.97 (H) 12/11/2024    HGBA1C 9.0 (H) 09/20/2024       Imaging:  The following imaging impressions were reviewed by me during this visit:    -10/20/2020 lumbar spine MRI shows lumbar facet arthropathy in the lower lumbar spine with no significant central or foraminal stenosis     I also personally reviewed the images from the above studies myself. These images and my interpretation of them contributed to the management and decision making of the patient's medical plan.    ASSESSMENT:  Ms. Eli Zavala is a 74 y.o. female with neck, low back, and bilateral foot pain that is consistent with:    1. Lumbar facet arthropathy    2. Arthropathy of cervical facet joint    3. Cervicalgia    4. Myofascial pain        PLAN:    Diagnostics:   - I reviewed her most recent cervical spine CT and lumbar spine MRI. No further diagnostics are indicated at this " time.    Physical Therapy and Rehabilitation:     - She has been referred to PT for her neck    Psychologically:  - No needs at this time    Medications  - No changes to medication at this time. Given her medical co-morbidities her medication options are fairly limited    Duration  - Low back pain for multiple years    Interventions:  - I suspect her neck pain is primarily myofascial in nature, with possible contributions from cervical facet arthropathy. She underwent additional trigger point injections previously with significant benefit. May repeat in the future as needed  - I suspect her low back pain is secondary to lumbar facet arthropathy, with possible contributions from vertebrogenic low back pain. She underwent diagnostic bilateral lumbar medial branch nerve blocks at L4/5, L5/S1 with 80% pain relief. Will plan to proceed with her 2nd diagnostic procedure utilizing live fluoroscopy and local anesthesia, with consideration for RF ablation in the future. Risks, benefits, alternatives discussed. 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 She underwent Scrambler therapy targeting the bilateral L5 and S1 dermatomes in the feet with significant benefit. Will monitor for duration of pain relief        Sincerely,  Perico Roberts MD  [facility] - [email or web address]

## 2025-05-13 NOTE — H&P (VIEW-ONLY)
[facility]  Follow Up Office Visit Note 2025    Patient Information: Eli Zavala, MRN: [id number], : 1950   Primary Care/Referring Physician: Jese Bonilla MD, [id number] / [place] [address]     Chief Complaint: Neck pain, low back pain, bilateral foot pain    Interval History: Ms. Zavala presents today for follow up. At her last visit I performed TPI's to her neck and planned for additional lumbar mbb's, which were never scheduled    Today she reports no major changes since last seen. Her neck pain remains fairly manageable since last seen, but she continues to have significant low back pain which makes standing and walking difficult. She would like to proceed with lumbar mbb's as previously planned. She is not sure why she never scheduled the 2nd set    She also underwent Scrambler therapy in the interim which continues to provide noticeable improvement in the neuropathy symptoms in her feet    Brief History of Pain: Ms. Eli Zavala is a 74 y.o. female with a PMHx of HTN, HLD, T2DM (A1c 9.0%), CAD, Afib (on Warfarin), CVA, ESRD (on IHD on Tues, Thurs, Sat)< hx of DVT, GERD, COPD, temporal arteritis who presents for neck and shoulder pain, low back pain, and bilateral foot pain     Currently her worst pain is in the neck/shoulders. She reports onset of pain approximately 2 weeks ago with no obvious inciting event. She describes pain bilaterally in the neck and shoulders, with significant pain with rotation in either direction as well as flexion and extension. Her neck feels very stiff. She denies any numbness, tingling, or weakness in her arms. She is using a heating pad on the neck with minimal relief. She tried lidocaine patches with no benefit    She also reports low back pain which started many years ago. She describes primarily axial low back pain which worsens significantly when she tries to straighten her back after bending forward. She does get some pain relief  "from lying down flat and sitting. She denies any pain radiating down her legs. She uses a walker at home to get around.     She also reports neuropathy in her feet since chemotherapy around 10 years ago. This is described as a \"stinging\" pain and is significantly worse at night, making it difficult to sleep    Current Pain Medications: OTC Tylenol  Previously Tried Pain Medications: Tizanidine - lowered her BP, Methocarbamol - lowered her BP    Relevant Surgeries: Denies lumbar or cervical spine surgery. She had a right ankle ORIF due to fracture  Injections: She reports undergoing some type of low back injections in the past (details unclear, ?SI joint injections)  Physical/Occupational Therapy: She was referred to PT for her neck but hasn't started this yet    Medications:   Current Outpatient Medications   Medication Instructions    acetaminophen (TYLENOL) 650 mg, Every 6 hours PRN    apixaban (ELIQUIS) 10 mg, 2 times daily    BD AutoShield Duo Pen Needle 30 gauge x 3/16\" needle MAY USE TO INJECT UP TO FOUR TIMES DAILY    calcium carbonate (Tums) 200 mg calcium chewable tablet Every 12 hours PRN    carvedilol (COREG) 6.25 mg, oral, 2 times daily (morning and late afternoon)    chlorhexidine (Peridex) 0.12 % solution     clobetasol (Temovate) 0.05 % cream Apply to affected vaginal area 2 times a week    diclofenac (Voltaren) 0.1 % ophthalmic solution 1 DROP IN AFFECTED EYES 4 TIMES A DAY AS NEEDED    fluocinolone (DermOtic) 0.01 % ear drops 4 drops to the affected ear/s twice daily as needed for itching. 1 bottle. 11 refills.    fluticasone (Flonase) 50 mcg/actuation nasal spray 2 sprays each nostril once daily, 3 bottles, 3 refills    FreeStyle Ron 2 [place] [facility] [place] USE AS DIRECTED    FreeStyle Ron 2 Sensor kit     insulin glargine (LANTUS) 10 Units, Nightly    insulin lispro (HumaLOG KwikPen Insulin) 100 unit/mL injection Inject under the skin. INJECT ACCORDING TO SLIDING SCALE ( 4 18 UNITS BEFORE MEALS)    " ketoconazole (NIZOral) 2 % cream 2 times daily    levothyroxine (SYNTHROID) 75 mcg, Daily before breakfast    methocarbamol (ROBAXIN) 500 mg, oral, 2 times daily PRN    midodrine (PROAMATINE) 5 mg, 3 times daily    nystatin (Mycostatin) 100,000 unit/gram powder Apply to affected area 3 times daily    nystatin (Mycostatin) cream 1 Application, Topical, 2 times daily    pantoprazole (PROTONIX) 40 mg, Daily    polyethylene glycol-electrolytes (polyethylene glycol) 420 gram solution     prednisoLONE acetate (Pred-Forte) 1 % ophthalmic suspension prn    Purelax 17 gram/dose powder DISSOLVE 17 GM(S) IN 8 OUNCES OF WATER AND DRINK DAILY    Repatha SureClick 140 mg, subcutaneous, Every 14 days    sennosides-docusate sodium (Senokot-S) 8.6-50 mg tablet 1 tablet, Daily RT    warfarin (COUMADIN) 2.5 mg    warfarin (COUMADIN) 5 mg      Allergies:   Allergies   Allergen Reactions    Levofloxacin Itching and Unknown    Penicillins Rash    Atorvastatin Other and Unknown    Fentanyl Nausea/vomiting    Heparin (Porcine) Nausea/vomiting    Sulfamethoxazole-Trimethoprim Other and Unknown    Adhesive Tape-Silicones Unknown    Amoxicillin Unknown    Omeprazole Unknown    Thiopental Unknown    Sulfa (Sulfonamide Antibiotics) Itching and Unknown       Past Medical & Surgical History:  Past Medical History:   Diagnosis Date    Atherosclerotic heart disease of native coronary artery without angina pectoris     Coronary heart disease    Chronic kidney disease, stage 3 unspecified (Multi)     Chronic kidney disease, stage III (moderate)    Hyperuricemia without signs of inflammatory arthritis and tophaceous disease     Asymptomatic hyperuricemia    Personal history of diseases of the blood and blood-forming organs and certain disorders involving the immune mechanism     History of anemia    Personal history of Hodgkin lymphoma     History of Hodgkin's lymphoma    Personal history of other diseases of the circulatory system     History of  essential hypertension    Personal history of other diseases of the digestive system     History of gastroesophageal reflux (GERD)    Personal history of other diseases of the musculoskeletal system and connective tissue     Personal history of arthritis    Personal history of other endocrine, nutritional and metabolic disease     History of hypothyroidism    Personal history of other endocrine, nutritional and metabolic disease 05/03/2021    History of type 2 diabetes mellitus    Personal history of other endocrine, nutritional and metabolic disease     History of hypercholesterolemia    Personal history of other endocrine, nutritional and metabolic disease     History of thyroid disease    Personal history of other specified conditions     History of shortness of breath    Type 2 diabetes mellitus with other diabetic kidney complication     Type 2 diabetes mellitus with renal manifestations      Past Surgical History:   Procedure Laterality Date    CHOLECYSTECTOMY  03/11/2014    Cholecystectomy Laparoscopic    INVASIVE VASCULAR PROCEDURE Left 9/20/2024    Procedure: Peripheral Fistulagram W/ Declot *labs on admit*;  Surgeon: Brandon Harrison MD;  Location: [facility];  Service: Vascular Surgery;  Laterality: Left;  *labs on admit*    MR HEAD ANGIO WO IV CONTRAST  8/16/2018    MR HEAD ANGIO WO IV CONTRAST [facility]    MR NECK ANGIO WO IV CONTRAST  8/16/2018    MR NECK ANGIO WO IV CONTRAST [facility]    OTHER SURGICAL HISTORY  09/16/2021    Hysterectomy    SINUS SURGERY  08/21/2014    Sinus Surgery    TONSILLECTOMY  08/21/2014    Tonsillectomy       No family history on file.  Social History     Socioeconomic History    Marital status:      Spouse name: Not on file    Number of children: Not on file    Years of education: Not on file    Highest education level: Not on file   Occupational History    Not on file   Tobacco Use    Smoking status: Never     Passive exposure: Never     Smokeless tobacco: Never   Vaping Use    Vaping status: Not on file   Substance and Sexual Activity    Alcohol use: Not Currently    Drug use: Never    Sexual activity: Defer   Other Topics Concern    Not on file   Social History Narrative    Not on file     Social Drivers of Health     Financial Resource Strain: Low Risk  (11/1/2022)    Received from [facility]    Overall Financial Resource Strain (CARDIA)     Difficulty of Paying Living Expenses: Not hard at all   Food Insecurity: No Food Insecurity (11/1/2022)    Received from [facility]    Hunger Vital Sign     Worried About Running Out of Food in the Last Year: Never true     Ran Out of Food in the Last Year: Never true   Transportation Needs: No Transportation Needs (11/1/2022)    Received from [facility]    PRAPARE - Transportation     Lack of Transportation (Medical): No     Lack of Transportation (Non-Medical): No   Physical Activity: Not on file   Stress: Not on file   Social Connections: Not on file   Intimate Partner Violence: Not on file   Housing Stability: Low Risk  (11/1/2022)    Received from [facility]    Housing Stability Vital Sign     Unable to Pay for Housing in the Last Year: No     Number of Places Lived in the Last Year: 1     Unstable Housing in the Last Year: No       Problems, Past medical history, past surgical history, Medications, allergies, social and family history reviewed and as per the electronic medical record from today's encounter    Review of Systems:  CONST: No fever, chills, fatigue, weight changes  EYES: No loss of vision  ENT: No hearing loss, tinnitus  CV: No chest pain, palpitations  RESP: No dyspnea, shortness of breath, cough  GI: No stool incontinence, nausea, vomiting  : No urinary incontinence  MSK: No joint swelling  SKIN: No rash, no hives  NEURO: No headache, dizziness  PSYCH: No anxiety, depression  HEM/LYMPH: Reports easy bruising    Physical Exam:  Vitals: /54   Pulse 78    "Resp 18   Ht 1.676 m (5' 6\")   Wt 74.8 kg (165 lb)   LMP  (LMP Unknown)   SpO2 99%   BMI 26.63 kg/m²   General: No apparent distress. Alert, appropriate, oriented x 3. Mood and affect normal. Speaking in full sentences.  HENT: Normocephalic, atraumatic. Hearing intact.  Eyes: Extraocular movements grossly intact. Pupils equal and round.   Neck: Supple, trachea midline.  Lungs: Symmetric respiratory excursion on visual exam, nonlabored breathing.  Extremities: No clubbing, cyanosis, or edema noted in arms or legs.  Skin: No rashes, lesions, alopecia noted on back or extremities.   Neuro: Alert and appropriate. Using a wheelchair. Bulk and tone within normal limits.    Laboratory Data:  The following laboratory data were reviewed during this visit:   Lab Results   Component Value Date    WBC 4.1 (L) 12/11/2024    RBC 3.83 (L) 12/11/2024    HGB 13.4 12/11/2024    HCT 41.5 12/11/2024     12/11/2024      Lab Results   Component Value Date    INR 1.10 03/26/2025    INR 1.30 01/27/2025    INR 2.10 11/18/2024     Lab Results   Component Value Date    CREATININE 3.97 (H) 12/11/2024    HGBA1C 9.0 (H) 09/20/2024       Imaging:  The following imaging impressions were reviewed by me during this visit:    -10/20/2020 lumbar spine MRI shows lumbar facet arthropathy in the lower lumbar spine with no significant central or foraminal stenosis     I also personally reviewed the images from the above studies myself. These images and my interpretation of them contributed to the management and decision making of the patient's medical plan.    ASSESSMENT:  Ms. Eli Zavala is a 74 y.o. female with neck, low back, and bilateral foot pain that is consistent with:    1. Lumbar facet arthropathy    2. Arthropathy of cervical facet joint    3. Cervicalgia    4. Myofascial pain        PLAN:    Diagnostics:   - I reviewed her most recent cervical spine CT and lumbar spine MRI. No further diagnostics are indicated at this " time.    Physical Therapy and Rehabilitation:     - She has been referred to PT for her neck    Psychologically:  - No needs at this time    Medications  - No changes to medication at this time. Given her medical co-morbidities her medication options are fairly limited    Duration  - Low back pain for multiple years    Interventions:  - I suspect her neck pain is primarily myofascial in nature, with possible contributions from cervical facet arthropathy. She underwent additional trigger point injections previously with significant benefit. May repeat in the future as needed  - I suspect her low back pain is secondary to lumbar facet arthropathy, with possible contributions from vertebrogenic low back pain. She underwent diagnostic bilateral lumbar medial branch nerve blocks at L4/5, L5/S1 with 80% pain relief. Will plan to proceed with her 2nd diagnostic procedure utilizing live fluoroscopy and local anesthesia, with consideration for RF ablation in the future. Risks, benefits, alternatives discussed. 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 She underwent Scrambler therapy targeting the bilateral L5 and S1 dermatomes in the feet with significant benefit. Will monitor for duration of pain relief        Sincerely,  Perico Roberts MD  [facility] [email or web address]

## 2025-05-14 ENCOUNTER — OFFICE VISIT (OUTPATIENT)
Dept: PAIN MEDICINE | Facility: CLINIC | Age: 75
End: 2025-05-14
Payer: MEDICARE

## 2025-05-14 VITALS
HEART RATE: 78 BPM | DIASTOLIC BLOOD PRESSURE: 54 MMHG | RESPIRATION RATE: 18 BRPM | WEIGHT: 165 LBS | HEIGHT: 66 IN | BODY MASS INDEX: 26.52 KG/M2 | OXYGEN SATURATION: 99 % | SYSTOLIC BLOOD PRESSURE: 106 MMHG

## 2025-05-14 DIAGNOSIS — M79.18 MYOFASCIAL PAIN: ICD-10-CM

## 2025-05-14 DIAGNOSIS — M47.816 LUMBAR FACET ARTHROPATHY: Primary | ICD-10-CM

## 2025-05-14 DIAGNOSIS — M54.2 CERVICALGIA: ICD-10-CM

## 2025-05-14 DIAGNOSIS — M47.812 ARTHROPATHY OF CERVICAL FACET JOINT: ICD-10-CM

## 2025-05-14 PROCEDURE — G2211 COMPLEX E/M VISIT ADD ON: HCPCS | Performed by: STUDENT IN AN ORGANIZED HEALTH CARE EDUCATION/TRAINING PROGRAM

## 2025-05-14 PROCEDURE — 99214 OFFICE O/P EST MOD 30 MIN: CPT | Performed by: STUDENT IN AN ORGANIZED HEALTH CARE EDUCATION/TRAINING PROGRAM

## 2025-05-14 PROCEDURE — 1160F RVW MEDS BY RX/DR IN RCRD: CPT | Performed by: STUDENT IN AN ORGANIZED HEALTH CARE EDUCATION/TRAINING PROGRAM

## 2025-05-14 PROCEDURE — 3074F SYST BP LT 130 MM HG: CPT | Performed by: STUDENT IN AN ORGANIZED HEALTH CARE EDUCATION/TRAINING PROGRAM

## 2025-05-14 PROCEDURE — 1159F MED LIST DOCD IN RCRD: CPT | Performed by: STUDENT IN AN ORGANIZED HEALTH CARE EDUCATION/TRAINING PROGRAM

## 2025-05-14 PROCEDURE — 1036F TOBACCO NON-USER: CPT | Performed by: STUDENT IN AN ORGANIZED HEALTH CARE EDUCATION/TRAINING PROGRAM

## 2025-05-14 PROCEDURE — 3078F DIAST BP <80 MM HG: CPT | Performed by: STUDENT IN AN ORGANIZED HEALTH CARE EDUCATION/TRAINING PROGRAM

## 2025-05-14 PROCEDURE — 1125F AMNT PAIN NOTED PAIN PRSNT: CPT | Performed by: STUDENT IN AN ORGANIZED HEALTH CARE EDUCATION/TRAINING PROGRAM

## 2025-05-14 ASSESSMENT — PATIENT HEALTH QUESTIONNAIRE - PHQ9
2. FEELING DOWN, DEPRESSED OR HOPELESS: SEVERAL DAYS
1. LITTLE INTEREST OR PLEASURE IN DOING THINGS: SEVERAL DAYS
10. IF YOU CHECKED OFF ANY PROBLEMS, HOW DIFFICULT HAVE THESE PROBLEMS MADE IT FOR YOU TO DO YOUR WORK, TAKE CARE OF THINGS AT HOME, OR GET ALONG WITH OTHER PEOPLE: SOMEWHAT DIFFICULT
SUM OF ALL RESPONSES TO PHQ9 QUESTIONS 1 AND 2: 2

## 2025-05-14 ASSESSMENT — PAIN DESCRIPTION - DESCRIPTORS: DESCRIPTORS: SHARP

## 2025-05-14 ASSESSMENT — PAIN SCALES - GENERAL
PAINLEVEL_OUTOF10: 9
PAINLEVEL_OUTOF10: 9

## 2025-05-14 ASSESSMENT — PAIN - FUNCTIONAL ASSESSMENT: PAIN_FUNCTIONAL_ASSESSMENT: 0-10

## 2025-06-02 ENCOUNTER — HOSPITAL ENCOUNTER (OUTPATIENT)
Dept: GASTROENTEROLOGY | Facility: HOSPITAL | Age: 75
Discharge: HOME | End: 2025-06-02
Payer: MEDICARE

## 2025-06-02 VITALS
HEART RATE: 72 BPM | BODY MASS INDEX: 26.52 KG/M2 | RESPIRATION RATE: 14 BRPM | HEIGHT: 66 IN | TEMPERATURE: 96.8 F | OXYGEN SATURATION: 99 % | DIASTOLIC BLOOD PRESSURE: 83 MMHG | WEIGHT: 165 LBS | SYSTOLIC BLOOD PRESSURE: 140 MMHG

## 2025-06-02 DIAGNOSIS — M47.816 LUMBAR FACET ARTHROPATHY: ICD-10-CM

## 2025-06-02 PROCEDURE — 64493 INJ PARAVERT F JNT L/S 1 LEV: CPT | Mod: 50 | Performed by: STUDENT IN AN ORGANIZED HEALTH CARE EDUCATION/TRAINING PROGRAM

## 2025-06-02 PROCEDURE — 2500000004 HC RX 250 GENERAL PHARMACY W/ HCPCS (ALT 636 FOR OP/ED): Performed by: STUDENT IN AN ORGANIZED HEALTH CARE EDUCATION/TRAINING PROGRAM

## 2025-06-02 PROCEDURE — 64494 INJ PARAVERT F JNT L/S 2 LEV: CPT | Performed by: STUDENT IN AN ORGANIZED HEALTH CARE EDUCATION/TRAINING PROGRAM

## 2025-06-02 RX ORDER — LIDOCAINE HYDROCHLORIDE 20 MG/ML
INJECTION, SOLUTION EPIDURAL; INFILTRATION; INTRACAUDAL; PERINEURAL AS NEEDED
Status: COMPLETED | OUTPATIENT
Start: 2025-06-02 | End: 2025-06-02

## 2025-06-02 RX ORDER — LIDOCAINE HYDROCHLORIDE 10 MG/ML
INJECTION, SOLUTION EPIDURAL; INFILTRATION; INTRACAUDAL; PERINEURAL AS NEEDED
Status: COMPLETED | OUTPATIENT
Start: 2025-06-02 | End: 2025-06-02

## 2025-06-02 RX ADMIN — LIDOCAINE HYDROCHLORIDE 6 ML: 10 INJECTION, SOLUTION EPIDURAL; INFILTRATION; INTRACAUDAL; PERINEURAL at 09:45

## 2025-06-02 RX ADMIN — LIDOCAINE HYDROCHLORIDE 3 ML: 20 INJECTION, SOLUTION EPIDURAL; INFILTRATION; INTRACAUDAL; PERINEURAL at 09:45

## 2025-06-02 ASSESSMENT — PAIN - FUNCTIONAL ASSESSMENT
PAIN_FUNCTIONAL_ASSESSMENT: 0-10
PAIN_FUNCTIONAL_ASSESSMENT: 0-10

## 2025-06-02 ASSESSMENT — PAIN SCALES - GENERAL
PAINLEVEL_OUTOF10: 0 - NO PAIN
PAINLEVEL_OUTOF10: 9

## 2025-06-02 ASSESSMENT — PAIN DESCRIPTION - DESCRIPTORS: DESCRIPTORS: SHARP

## 2025-06-02 NOTE — DISCHARGE INSTRUCTIONS
DISCHARGE INSTRUCTIONS FOR INJECTIONS     You underwent diagnostic bilateral lumbar medial branch nerve blocks at L4/5, L5/S1 today    Aftermost injections, it is recommended that you relax and limit your activity for the remainder of the day unless you have been told otherwise by your pain physician.  You should not drive a car, operate machinery, or make important legal decisions unless otherwise directed by your pain physician.  You may resume your normal activity, including exercise, tomorrow.      Keep a written pain diary of how much pain relief you experienced following the injection procedure and the length of time of pain relief you experienced pain relief. Following diagnostic injections like medial branch nerve blocks, sacroiliac joint blocks, stellate ganglion injections and other blocks, it is very important you record the specific amount of pain relief you experienced immediately after the injectionand how long it lasted. Your doctor will ask you for this information at your follow up visit.     For all injections, please keep the injection site dry and inspect the site for a couple of days. You may remove the Band-Aid the day of the injection at any time.     Some discomfort, bruising or slight swelling may occur at the injection site. This is not abnormal if it occurs.  If needed you may:    -Take over the counter medication such as Tylenol or Motrin.   -Apply an ice pack for 30 minutes, 2 to 3 times a day for the first 24 hours.     You may shower today; no soaking baths, hot tubs, whirlpools or swimming pools for two days.      If you are given steroids in your injection, it may take 3-5 days for the steroid medication to take effect. You may notice a worsening of your symptoms for 1-2 days after the injection. This is not abnormal.  You may use acetaminophen, ibuprofen, or prescription medication that your doctor may have prescribed for you if you need to do so.     A few common side effects of  steroids include facial flushing, sweating, restlessness, irritability,difficulty sleeping, increase in blood sugar, and increased blood pressure. If you have diabetes, please monitor your blood sugar at least once a day for at least 5 days. If you have poorly controlled high blood pressure, monitoryour blood pressure for at least 2 days and contact your primary care physician if these numbers are unusually high for you.      If you take aspirin or non-steroidal anti-inflammatory drugs (examples are Motrin, Advil, ibuprofen, Naprosyn, Voltaren, Relafen, etc.) you may restart these this evening, but stop taking it 3 days before your next appointment, unless instructed otherwiseby your physician.      You do not need to discontinue non-aspirin-containing pain medications prior to an injection (examples: Celebrex, tramadol, hydrocodone and acetaminophen).      If you take a blood thinning medication (Coumadin, Lovenox, Fragmin,Ticlid, Plavix, Pradaxa, etc.), please discuss this with your primary care physician/cardiologist and your pain physician. These medications MUST be discontinued before you can have an injection safely, without the risk of uncontrolled bleeding. If these medications are not discontinued for an appropriate period of time, you will not be able to receivean injection.      If you are taking Coumadin, please have your INR checked the morning of your procedure and bringthe result to your appointment unless otherwise instructed. If your INR is over 1.2, your injection may need to be rescheduled to avoid uncontrolled bleeding from the needle placement.     Call [facility] at [phone] between 8am-4pm Monday - Friday if you are experiencing the following:    If you received an epidural or spinal injection:    -Headache that doesnot go away with medicine, is worse when sitting or standing up, and is greatly relieved upon lying down.   -Severe pain worse than or different than your  baseline pain.   -Chills or fever (101º F or greater).   -Drainage or signs of infection at the injection site     Go directly to the Emergency Department if you are experiencing the following and received an epidural or spinal injection:   -Abrupt weakness or progressive weakness in your legs that starts after you leave the clinic.   -Abrupt severe or worsening numbness in your legs.   -Inability to urinate after the injection or loss of bowel or bladder control without the urge to defecate or urinate.     If you have a clinical question that cannot wait until your next appointment, please call [phone] between 8am-4pm Monday - Friday or send a [employer] message. We do our best to return all non-emergency messages within 24 hours, Monday - Friday. A nurse or physician will return your message.      If you need to cancel an appointment, please call the scheduling staff at [phone] during normal business hours or leave a message at least 24 hours in advance.     If you are going to be sedated for your next procedure, you MUST have responsible adult who can legally drive accompany you home. You cannot eat or drink for eight hours prior to the planned procedure if you are going to receive sedation. You may take your non-blood thinning medications with a small sip of water.

## 2025-06-02 NOTE — POST-PROCEDURE NOTE
PATIENT RECEIVED FROM PROCEDURE ALERT AND ORIENTED. DENIES ANY C/O PAIN. BAND-AID X2  TO BACK CLEAN , DRY, AND INTACT.   PATIENT ABLE TO STAND AND AMBULATE. DISCHARGE INSTRUCTIONS REVIEWED WITH PATIENT.

## 2025-06-12 ENCOUNTER — ANTICOAGULATION - WARFARIN VISIT (OUTPATIENT)
Dept: CARDIOLOGY | Facility: HOSPITAL | Age: 75
End: 2025-06-12
Payer: MEDICARE

## 2025-06-16 ENCOUNTER — ANTICOAGULATION - WARFARIN VISIT (OUTPATIENT)
Dept: CARDIOLOGY | Facility: HOSPITAL | Age: 75
End: 2025-06-16
Payer: MEDICARE

## 2025-06-18 ENCOUNTER — OFFICE VISIT (OUTPATIENT)
Dept: PAIN MEDICINE | Facility: CLINIC | Age: 75
End: 2025-06-18
Payer: MEDICARE

## 2025-06-18 VITALS
BODY MASS INDEX: 26.52 KG/M2 | HEART RATE: 79 BPM | DIASTOLIC BLOOD PRESSURE: 56 MMHG | HEIGHT: 66 IN | OXYGEN SATURATION: 98 % | SYSTOLIC BLOOD PRESSURE: 110 MMHG | WEIGHT: 165 LBS | RESPIRATION RATE: 18 BRPM

## 2025-06-18 DIAGNOSIS — E11.40 PAINFUL DIABETIC NEUROPATHY (MULTI): ICD-10-CM

## 2025-06-18 DIAGNOSIS — M47.816 LUMBAR FACET ARTHROPATHY: Primary | ICD-10-CM

## 2025-06-18 PROCEDURE — 1125F AMNT PAIN NOTED PAIN PRSNT: CPT | Performed by: STUDENT IN AN ORGANIZED HEALTH CARE EDUCATION/TRAINING PROGRAM

## 2025-06-18 PROCEDURE — 99214 OFFICE O/P EST MOD 30 MIN: CPT | Performed by: STUDENT IN AN ORGANIZED HEALTH CARE EDUCATION/TRAINING PROGRAM

## 2025-06-18 PROCEDURE — 1159F MED LIST DOCD IN RCRD: CPT | Performed by: STUDENT IN AN ORGANIZED HEALTH CARE EDUCATION/TRAINING PROGRAM

## 2025-06-18 PROCEDURE — 3074F SYST BP LT 130 MM HG: CPT | Performed by: STUDENT IN AN ORGANIZED HEALTH CARE EDUCATION/TRAINING PROGRAM

## 2025-06-18 PROCEDURE — G2211 COMPLEX E/M VISIT ADD ON: HCPCS | Performed by: STUDENT IN AN ORGANIZED HEALTH CARE EDUCATION/TRAINING PROGRAM

## 2025-06-18 PROCEDURE — 1160F RVW MEDS BY RX/DR IN RCRD: CPT | Performed by: STUDENT IN AN ORGANIZED HEALTH CARE EDUCATION/TRAINING PROGRAM

## 2025-06-18 PROCEDURE — 3078F DIAST BP <80 MM HG: CPT | Performed by: STUDENT IN AN ORGANIZED HEALTH CARE EDUCATION/TRAINING PROGRAM

## 2025-06-18 PROCEDURE — 1036F TOBACCO NON-USER: CPT | Performed by: STUDENT IN AN ORGANIZED HEALTH CARE EDUCATION/TRAINING PROGRAM

## 2025-06-18 ASSESSMENT — PAIN SCALES - GENERAL
PAINLEVEL_OUTOF10: 8
PAINLEVEL_OUTOF10: 8

## 2025-06-18 ASSESSMENT — PAIN - FUNCTIONAL ASSESSMENT: PAIN_FUNCTIONAL_ASSESSMENT: 0-10

## 2025-06-18 ASSESSMENT — PAIN DESCRIPTION - DESCRIPTORS: DESCRIPTORS: SHARP

## 2025-06-18 NOTE — PROGRESS NOTES
[facility]  Follow Up Office Visit Note 2025    Patient Information: Eli Zavala, MRN: [id number], : 1950   Primary Care/Referring Physician: Jese Bonilla MD, [id number] / [place] [id number]     Chief Complaint: Neck pain, low back pain, bilateral foot pain    Interval History: Ms. Zavala presents today for follow up after her 2nd set of lumbar mbb's at L4/5, L5/S1.     Today she reports again 90% relief from this injection.  Her pain has returned to baseline and wants to proceed with lumbar RF ablation.  Her neck pain remains well-controlled since last seen.    She is also wondering if there is anything else that can be done to address the painful diabetic neuropathy in her feet.  She did get some relief from the scrambler therapy but continues to struggle with electric, shocking pains in her feet at night.    Brief History of Pain: Ms. Eli Zavala is a 74 y.o. female with a PMHx of HTN, HLD, T2DM (A1c 9.0%), CAD, Afib (on Warfarin), CVA, ESRD (on IHD on Tues, Thurs, Sat), hx of DVT, GERD, COPD, temporal arteritis who presents for neck and shoulder pain, low back pain, and bilateral foot pain     Currently her worst pain is in the neck/shoulders. She reports onset of pain approximately 2 weeks ago with no obvious inciting event. She describes pain bilaterally in the neck and shoulders, with significant pain with rotation in either direction as well as flexion and extension. Her neck feels very stiff. She denies any numbness, tingling, or weakness in her arms. She is using a heating pad on the neck with minimal relief. She tried lidocaine patches with no benefit    She also reports low back pain which started many years ago. She describes primarily axial low back pain which worsens significantly when she tries to straighten her back after bending forward. She does get some pain relief from lying down flat and sitting. She denies any pain radiating down her legs. She uses a  "walker at home to get around.     She also reports neuropathy in her feet since chemotherapy around 10 years ago. This is described as a \"stinging\" pain and is significantly worse at night, making it difficult to sleep    Current Pain Medications: OTC Tylenol  Previously Tried Pain Medications: Tizanidine - lowered her BP, Methocarbamol - lowered her BP, Gabapentin and Pregabalin - side effects    Relevant Surgeries: Denies lumbar or cervical spine surgery. She had a right ankle ORIF due to fracture  Injections: She reports undergoing some type of low back injections in the past (details unclear, ?SI joint injections)  Physical/Occupational Therapy: She was referred to PT for her neck but hasn't started this yet    Medications:   Current Outpatient Medications   Medication Instructions    acetaminophen (TYLENOL) 650 mg, Every 6 hours PRN    apixaban (ELIQUIS) 10 mg, 2 times daily    BD AutoShield Duo Pen Needle 30 gauge x 3/16\" needle MAY USE TO INJECT UP TO FOUR TIMES DAILY    calcium carbonate (Tums) 200 mg calcium chewable tablet Every 12 hours PRN    chlorhexidine (Peridex) 0.12 % solution     clobetasol (Temovate) 0.05 % cream Apply to affected vaginal area 2 times a week    diclofenac (Voltaren) 0.1 % ophthalmic solution 1 DROP IN AFFECTED EYES 4 TIMES A DAY AS NEEDED    fluocinolone (DermOtic) 0.01 % ear drops 4 drops to the affected ear/s twice daily as needed for itching. 1 bottle. 11 refills.    fluticasone (Flonase) 50 mcg/actuation nasal spray 2 sprays each nostril once daily, 3 bottles, 3 refills    FreeStyle Ron 2 [facility] USE AS DIRECTED    FreeStyle Ron 2 Sensor kit     insulin glargine (LANTUS) 10 Units, Nightly    insulin lispro (HumaLOG KwikPen Insulin) 100 unit/mL injection Inject under the skin. INJECT ACCORDING TO SLIDING SCALE ( 4 18 UNITS BEFORE MEALS)    levothyroxine (SYNTHROID) 75 mcg, Daily before breakfast    methocarbamol (ROBAXIN) 500 mg, oral, 2 times daily PRN    midodrine " (PROAMATINE) 5 mg, 3 times daily    nystatin (Mycostatin) 100,000 unit/gram powder Apply to affected area 3 times daily    nystatin (Mycostatin) cream 1 Application, Topical, 2 times daily    pantoprazole (PROTONIX) 40 mg, Daily    polyethylene glycol-electrolytes (polyethylene glycol) 420 gram solution     prednisoLONE acetate (Pred-Forte) 1 % ophthalmic suspension prn    Purelax 17 gram/dose powder DISSOLVE 17 GM(S) IN 8 OUNCES OF WATER AND DRINK DAILY    Repatha SureClick 140 mg, subcutaneous, Every 14 days    sennosides-docusate sodium (Senokot-S) 8.6-50 mg tablet 1 tablet, Daily RT      Allergies:   Allergies   Allergen Reactions    Levofloxacin Itching and Unknown    Penicillins Rash    Atorvastatin Other and Unknown    Fentanyl Nausea/vomiting    Heparin (Porcine) Nausea/vomiting    Sulfamethoxazole-Trimethoprim Other and Unknown    Adhesive Tape-Silicones Unknown    Amoxicillin Unknown    Omeprazole Unknown    Thiopental Unknown    Sulfa (Sulfonamide Antibiotics) Itching and Unknown       Past Medical & Surgical History:  Past Medical History:   Diagnosis Date    Atherosclerotic heart disease of native coronary artery without angina pectoris     Coronary heart disease    Chronic kidney disease, stage 3 unspecified (Multi)     Chronic kidney disease, stage III (moderate)    Hyperuricemia without signs of inflammatory arthritis and tophaceous disease     Asymptomatic hyperuricemia    Personal history of diseases of the blood and blood-forming organs and certain disorders involving the immune mechanism     History of anemia    Personal history of Hodgkin lymphoma     History of Hodgkin's lymphoma    Personal history of other diseases of the circulatory system     History of essential hypertension    Personal history of other diseases of the digestive system     History of gastroesophageal reflux (GERD)    Personal history of other diseases of the musculoskeletal system and connective tissue     Personal history  of arthritis    Personal history of other endocrine, nutritional and metabolic disease     History of hypothyroidism    Personal history of other endocrine, nutritional and metabolic disease 05/03/2021    History of type 2 diabetes mellitus    Personal history of other endocrine, nutritional and metabolic disease     History of hypercholesterolemia    Personal history of other endocrine, nutritional and metabolic disease     History of thyroid disease    Personal history of other specified conditions     History of shortness of breath    Type 2 diabetes mellitus with other diabetic kidney complication     Type 2 diabetes mellitus with renal manifestations      Past Surgical History:   Procedure Laterality Date    CHOLECYSTECTOMY  03/11/2014    Cholecystectomy Laparoscopic    INVASIVE VASCULAR PROCEDURE Left 9/20/2024    Procedure: Peripheral Fistulagram W/ Declot *labs on admit*;  Surgeon: Brandon Harrison MD;  Location: [facility];  Service: Vascular Surgery;  Laterality: Left;  *labs on admit*    MR HEAD ANGIO WO IV CONTRAST  8/16/2018    MR HEAD ANGIO WO IV CONTRAST [facility]    MR NECK ANGIO WO IV CONTRAST  8/16/2018    MR NECK ANGIO WO IV CONTRAST [facility]    OTHER SURGICAL HISTORY  09/16/2021    Hysterectomy    SINUS SURGERY  08/21/2014    Sinus Surgery    TONSILLECTOMY  08/21/2014    Tonsillectomy       No family history on file.  Social History     Socioeconomic History    Marital status:      Spouse name: Not on file    Number of children: Not on file    Years of education: Not on file    Highest education level: Not on file   Occupational History    Not on file   Tobacco Use    Smoking status: Never     Passive exposure: Never    Smokeless tobacco: Never   Vaping Use    Vaping status: Not on file   Substance and Sexual Activity    Alcohol use: Not Currently    Drug use: Never    Sexual activity: Defer   Other Topics Concern    Not on file   Social History Narrative     "Not on file     Social Drivers of Health     Financial Resource Strain: Low Risk  (11/1/2022)    Received from [facility]    Overall Financial Resource Strain (CARDIA)     Difficulty of Paying Living Expenses: Not hard at all   Food Insecurity: No Food Insecurity (11/1/2022)    Received from [facility]    Hunger Vital Sign     Worried About Running Out of Food in the Last Year: Never true     Ran Out of Food in the Last Year: Never true   Transportation Needs: No Transportation Needs (11/1/2022)    Received from [facility]    PRAPARE - Transportation     Lack of Transportation (Medical): No     Lack of Transportation (Non-Medical): No   Physical Activity: Not on file   Stress: Not on file   Social Connections: Not on file   Intimate Partner Violence: Not on file   Housing Stability: Low Risk  (11/1/2022)    Received from [facility]    Housing Stability Vital Sign     Unable to Pay for Housing in the Last Year: No     Number of Places Lived in the Last Year: 1     Unstable Housing in the Last Year: No       Problems, Past medical history, past surgical history, Medications, allergies, social and family history reviewed and as per the electronic medical record from today's encounter    Review of Systems:  CONST: No fever, chills, fatigue, weight changes  EYES: No loss of vision  ENT: No hearing loss, tinnitus  CV: No chest pain, palpitations  RESP: No dyspnea, shortness of breath, cough  GI: No stool incontinence, nausea, vomiting  : No urinary incontinence  MSK: No joint swelling  SKIN: No rash, no hives  NEURO: No headache, dizziness  PSYCH: No anxiety, depression  HEM/LYMPH: Reports easy bruising    Physical Exam:  Vitals: /56   Pulse 79   Resp 18   Ht 1.676 m (5' 6\")   Wt 74.8 kg (165 lb)   LMP  (LMP Unknown)   SpO2 98%   BMI 26.63 kg/m²   General: No apparent distress. Alert, appropriate, oriented x 3. Mood and affect normal. Speaking in full sentences.  HENT: Normocephalic, " atraumatic. Hearing intact.  Eyes: Extraocular movements grossly intact. Pupils equal and round.   Neck: Supple, trachea midline.  Lungs: Symmetric respiratory excursion on visual exam, nonlabored breathing.  Extremities: No clubbing, cyanosis, or edema noted in arms or legs.  Skin: No rashes, lesions, alopecia noted on back or extremities.   Neuro: Alert and appropriate. Using a wheelchair. Bulk and tone within normal limits.    Laboratory Data:  The following laboratory data were reviewed during this visit:   Lab Results   Component Value Date    WBC 4.1 (L) 12/11/2024    RBC 3.83 (L) 12/11/2024    HGB 13.4 12/11/2024    HCT 41.5 12/11/2024     12/11/2024      Lab Results   Component Value Date    INR 1.10 03/26/2025    INR 1.30 01/27/2025    INR 2.10 11/18/2024     Lab Results   Component Value Date    CREATININE 3.97 (H) 12/11/2024    HGBA1C 9.0 (H) 09/20/2024       Imaging:  The following imaging impressions were reviewed by me during this visit:    -10/20/2020 lumbar spine MRI shows lumbar facet arthropathy in the lower lumbar spine with no significant central or foraminal stenosis     I also personally reviewed the images from the above studies myself. These images and my interpretation of them contributed to the management and decision making of the patient's medical plan.    ASSESSMENT:  Ms. Eli Zavala is a 74 y.o. female with neck, low back, and bilateral foot pain that is consistent with:    1. Lumbar facet arthropathy    2. Painful diabetic neuropathy (Multi)          PLAN:    Diagnostics:   - I reviewed her most recent cervical spine CT and lumbar spine MRI. No further diagnostics are indicated at this time.    Physical Therapy and Rehabilitation:     - She has been referred to PT for her neck    Psychologically:  - No needs at this time    Medications  - No changes to medication at this time. Given her medical co-morbidities her medication options are fairly limited    Duration  - Low back pain  for multiple years    Interventions:  - I suspect her neck pain is primarily myofascial in nature, with possible contributions from cervical facet arthropathy. She underwent additional trigger point injections previously with significant benefit. May repeat in the future as needed  - I suspect her low back pain is secondary to lumbar facet arthropathy, with possible contributions from vertebrogenic low back pain. She underwent diagnostic bilateral lumbar medial branch nerve blocks x2 at L4/5, L5/S1 with >80% pain relief each time. I recommend proceeding with RF ablation utilizing live fluoroscopy and local anesthesia.  Risk, benefits, alternatives discussed.  She would like to proceed.  - I suspect her foot pain is secondary to chemotherapy induced peripheral neuropathy and painful diabetic neuropathy. She underwent Scrambler therapy targeting the bilateral L5 and S1 dermatomes in the feet with some improvement but continues to struggle with electric pain in her feet at night.  She has tried other neuropathic's which did not help and were poorly tolerated.  Given this I recommend trial of Qutenza targeting her bilateral feet at her next office visit.        Sincerely,  Perico Roberts MD  [facility] - [email or web address]   done

## 2025-06-18 NOTE — H&P (VIEW-ONLY)
[facility]  Follow Up Office Visit Note 2025    Patient Information: Eli Zavala, MRN: [id number], : 1950   Primary Care/Referring Physician: Jese Bonilla MD, [id number] / [place] [address]     Chief Complaint: Neck pain, low back pain, bilateral foot pain    Interval History: Ms. Zavala presents today for follow up after her 2nd set of lumbar mbb's at L4/5, L5/S1.     Today she reports again 90% relief from this injection.  Her pain has returned to baseline and wants to proceed with lumbar RF ablation.  Her neck pain remains well-controlled since last seen.    She is also wondering if there is anything else that can be done to address the painful diabetic neuropathy in her feet.  She did get some relief from the scrambler therapy but continues to struggle with electric, shocking pains in her feet at night.    Brief History of Pain: Ms. Eli Zavala is a 74 y.o. female with a PMHx of HTN, HLD, T2DM (A1c 9.0%), CAD, Afib (on Warfarin), CVA, ESRD (on IHD on Tues, Thurs, Sat), hx of DVT, GERD, COPD, temporal arteritis who presents for neck and shoulder pain, low back pain, and bilateral foot pain     Currently her worst pain is in the neck/shoulders. She reports onset of pain approximately 2 weeks ago with no obvious inciting event. She describes pain bilaterally in the neck and shoulders, with significant pain with rotation in either direction as well as flexion and extension. Her neck feels very stiff. She denies any numbness, tingling, or weakness in her arms. She is using a heating pad on the neck with minimal relief. She tried lidocaine patches with no benefit    She also reports low back pain which started many years ago. She describes primarily axial low back pain which worsens significantly when she tries to straighten her back after bending forward. She does get some pain relief from lying down flat and sitting. She denies any pain radiating down her legs. She uses a  "walker at home to get around.     She also reports neuropathy in her feet since chemotherapy around 10 years ago. This is described as a \"stinging\" pain and is significantly worse at night, making it difficult to sleep    Current Pain Medications: OTC Tylenol  Previously Tried Pain Medications: Tizanidine - lowered her BP, Methocarbamol - lowered her BP, Gabapentin and Pregabalin - side effects    Relevant Surgeries: Denies lumbar or cervical spine surgery. She had a right ankle ORIF due to fracture  Injections: She reports undergoing some type of low back injections in the past (details unclear, ?SI joint injections)  Physical/Occupational Therapy: She was referred to PT for her neck but hasn't started this yet    Medications:   Current Outpatient Medications   Medication Instructions    acetaminophen (TYLENOL) 650 mg, Every 6 hours PRN    apixaban (ELIQUIS) 10 mg, 2 times daily    BD AutoShield Duo Pen Needle 30 gauge x 3/16\" needle MAY USE TO INJECT UP TO FOUR TIMES DAILY    calcium carbonate (Tums) 200 mg calcium chewable tablet Every 12 hours PRN    chlorhexidine (Peridex) 0.12 % solution     clobetasol (Temovate) 0.05 % cream Apply to affected vaginal area 2 times a week    diclofenac (Voltaren) 0.1 % ophthalmic solution 1 DROP IN AFFECTED EYES 4 TIMES A DAY AS NEEDED    fluocinolone (DermOtic) 0.01 % ear drops 4 drops to the affected ear/s twice daily as needed for itching. 1 bottle. 11 refills.    fluticasone (Flonase) 50 mcg/actuation nasal spray 2 sprays each nostril once daily, 3 bottles, 3 refills    FreeStyle Ron 2 [facility] [place] USE AS DIRECTED    FreeStyle Ron 2 Sensor kit     insulin glargine (LANTUS) 10 Units, Nightly    insulin lispro (HumaLOG KwikPen Insulin) 100 unit/mL injection Inject under the skin. INJECT ACCORDING TO SLIDING SCALE ( 4 18 UNITS BEFORE MEALS)    levothyroxine (SYNTHROID) 75 mcg, Daily before breakfast    methocarbamol (ROBAXIN) 500 mg, oral, 2 times daily PRN    midodrine " (PROAMATINE) 5 mg, 3 times daily    nystatin (Mycostatin) 100,000 unit/gram powder Apply to affected area 3 times daily    nystatin (Mycostatin) cream 1 Application, Topical, 2 times daily    pantoprazole (PROTONIX) 40 mg, Daily    polyethylene glycol-electrolytes (polyethylene glycol) 420 gram solution     prednisoLONE acetate (Pred-Forte) 1 % ophthalmic suspension prn    Purelax 17 gram/dose powder DISSOLVE 17 GM(S) IN 8 OUNCES OF WATER AND DRINK DAILY    Repatha SureClick 140 mg, subcutaneous, Every 14 days    sennosides-docusate sodium (Senokot-S) 8.6-50 mg tablet 1 tablet, Daily RT      Allergies:   Allergies   Allergen Reactions    Levofloxacin Itching and Unknown    Penicillins Rash    Atorvastatin Other and Unknown    Fentanyl Nausea/vomiting    Heparin (Porcine) Nausea/vomiting    Sulfamethoxazole-Trimethoprim Other and Unknown    Adhesive Tape-Silicones Unknown    Amoxicillin Unknown    Omeprazole Unknown    Thiopental Unknown    Sulfa (Sulfonamide Antibiotics) Itching and Unknown       Past Medical & Surgical History:  Past Medical History:   Diagnosis Date    Atherosclerotic heart disease of native coronary artery without angina pectoris     Coronary heart disease    Chronic kidney disease, stage 3 unspecified (Multi)     Chronic kidney disease, stage III (moderate)    Hyperuricemia without signs of inflammatory arthritis and tophaceous disease     Asymptomatic hyperuricemia    Personal history of diseases of the blood and blood-forming organs and certain disorders involving the immune mechanism     History of anemia    Personal history of Hodgkin lymphoma     History of Hodgkin's lymphoma    Personal history of other diseases of the circulatory system     History of essential hypertension    Personal history of other diseases of the digestive system     History of gastroesophageal reflux (GERD)    Personal history of other diseases of the musculoskeletal system and connective tissue     Personal history  of arthritis    Personal history of other endocrine, nutritional and metabolic disease     History of hypothyroidism    Personal history of other endocrine, nutritional and metabolic disease 05/03/2021    History of type 2 diabetes mellitus    Personal history of other endocrine, nutritional and metabolic disease     History of hypercholesterolemia    Personal history of other endocrine, nutritional and metabolic disease     History of thyroid disease    Personal history of other specified conditions     History of shortness of breath    Type 2 diabetes mellitus with other diabetic kidney complication     Type 2 diabetes mellitus with renal manifestations      Past Surgical History:   Procedure Laterality Date    CHOLECYSTECTOMY  03/11/2014    Cholecystectomy Laparoscopic    INVASIVE VASCULAR PROCEDURE Left 9/20/2024    Procedure: Peripheral Fistulagram W/ Declot *labs on admit*;  Surgeon: Brandon Harrison MD;  Location: [facility];  Service: Vascular Surgery;  Laterality: Left;  *labs on admit*    MR HEAD ANGIO WO IV CONTRAST  8/16/2018    MR HEAD ANGIO WO IV CONTRAST [facility]    MR NECK ANGIO WO IV CONTRAST  8/16/2018    MR NECK ANGIO WO IV CONTRAST [facility]    OTHER SURGICAL HISTORY  09/16/2021    Hysterectomy    SINUS SURGERY  08/21/2014    Sinus Surgery    TONSILLECTOMY  08/21/2014    Tonsillectomy       No family history on file.  Social History     Socioeconomic History    Marital status:      Spouse name: Not on file    Number of children: Not on file    Years of education: Not on file    Highest education level: Not on file   Occupational History    Not on file   Tobacco Use    Smoking status: Never     Passive exposure: Never    Smokeless tobacco: Never   Vaping Use    Vaping status: Not on file   Substance and Sexual Activity    Alcohol use: Not Currently    Drug use: Never    Sexual activity: Defer   Other Topics Concern    Not on file   Social History Narrative     "Not on file     Social Drivers of Health     Financial Resource Strain: Low Risk  (11/1/2022)    Received from [facility]    Overall Financial Resource Strain (CARDIA)     Difficulty of Paying Living Expenses: Not hard at all   Food Insecurity: No Food Insecurity (11/1/2022)    Received from [facility]    Hunger Vital Sign     Worried About Running Out of Food in the Last Year: Never true     Ran Out of Food in the Last Year: Never true   Transportation Needs: No Transportation Needs (11/1/2022)    Received from [facility]    PRAPARE - Transportation     Lack of Transportation (Medical): No     Lack of Transportation (Non-Medical): No   Physical Activity: Not on file   Stress: Not on file   Social Connections: Not on file   Intimate Partner Violence: Not on file   Housing Stability: Low Risk  (11/1/2022)    Received from [facility]    Housing Stability Vital Sign     Unable to Pay for Housing in the Last Year: No     Number of Places Lived in the Last Year: 1     Unstable Housing in the Last Year: No       Problems, Past medical history, past surgical history, Medications, allergies, social and family history reviewed and as per the electronic medical record from today's encounter    Review of Systems:  CONST: No fever, chills, fatigue, weight changes  EYES: No loss of vision  ENT: No hearing loss, tinnitus  CV: No chest pain, palpitations  RESP: No dyspnea, shortness of breath, cough  GI: No stool incontinence, nausea, vomiting  : No urinary incontinence  MSK: No joint swelling  SKIN: No rash, no hives  NEURO: No headache, dizziness  PSYCH: No anxiety, depression  HEM/LYMPH: Reports easy bruising    Physical Exam:  Vitals: /56   Pulse 79   Resp 18   Ht 1.676 m (5' 6\")   Wt 74.8 kg (165 lb)   LMP  (LMP Unknown)   SpO2 98%   BMI 26.63 kg/m²   General: No apparent distress. Alert, appropriate, oriented x 3. Mood and affect normal. Speaking in full sentences.  HENT: Normocephalic, " atraumatic. Hearing intact.  Eyes: Extraocular movements grossly intact. Pupils equal and round.   Neck: Supple, trachea midline.  Lungs: Symmetric respiratory excursion on visual exam, nonlabored breathing.  Extremities: No clubbing, cyanosis, or edema noted in arms or legs.  Skin: No rashes, lesions, alopecia noted on back or extremities.   Neuro: Alert and appropriate. Using a wheelchair. Bulk and tone within normal limits.    Laboratory Data:  The following laboratory data were reviewed during this visit:   Lab Results   Component Value Date    WBC 4.1 (L) 12/11/2024    RBC 3.83 (L) 12/11/2024    HGB 13.4 12/11/2024    HCT 41.5 12/11/2024     12/11/2024      Lab Results   Component Value Date    INR 1.10 03/26/2025    INR 1.30 01/27/2025    INR 2.10 11/18/2024     Lab Results   Component Value Date    CREATININE 3.97 (H) 12/11/2024    HGBA1C 9.0 (H) 09/20/2024       Imaging:  The following imaging impressions were reviewed by me during this visit:    -10/20/2020 lumbar spine MRI shows lumbar facet arthropathy in the lower lumbar spine with no significant central or foraminal stenosis     I also personally reviewed the images from the above studies myself. These images and my interpretation of them contributed to the management and decision making of the patient's medical plan.    ASSESSMENT:  Ms. Eli Zavala is a 74 y.o. female with neck, low back, and bilateral foot pain that is consistent with:    1. Lumbar facet arthropathy    2. Painful diabetic neuropathy (Multi)          PLAN:    Diagnostics:   - I reviewed her most recent cervical spine CT and lumbar spine MRI. No further diagnostics are indicated at this time.    Physical Therapy and Rehabilitation:     - She has been referred to PT for her neck    Psychologically:  - No needs at this time    Medications  - No changes to medication at this time. Given her medical co-morbidities her medication options are fairly limited    Duration  - Low back pain  for multiple years    Interventions:  - I suspect her neck pain is primarily myofascial in nature, with possible contributions from cervical facet arthropathy. She underwent additional trigger point injections previously with significant benefit. May repeat in the future as needed  - I suspect her low back pain is secondary to lumbar facet arthropathy, with possible contributions from vertebrogenic low back pain. She underwent diagnostic bilateral lumbar medial branch nerve blocks x2 at L4/5, L5/S1 with >80% pain relief each time. I recommend proceeding with RF ablation utilizing live fluoroscopy and local anesthesia.  Risk, benefits, alternatives discussed.  She would like to proceed.  - I suspect her foot pain is secondary to chemotherapy induced peripheral neuropathy and painful diabetic neuropathy. She underwent Scrambler therapy targeting the bilateral L5 and S1 dermatomes in the feet with some improvement but continues to struggle with electric pain in her feet at night.  She has tried other neuropathic's which did not help and were poorly tolerated.  Given this I recommend trial of Qutenza targeting her bilateral feet at her next office visit.        Sincerely,  Perico Roberts MD  Count includes the [facility] - [email or web address]

## 2025-06-23 DIAGNOSIS — E11.40 PAINFUL DIABETIC NEUROPATHY (MULTI): ICD-10-CM

## 2025-07-07 ENCOUNTER — HOSPITAL ENCOUNTER (OUTPATIENT)
Dept: GASTROENTEROLOGY | Facility: HOSPITAL | Age: 75
Discharge: HOME | End: 2025-07-07
Payer: MEDICARE

## 2025-07-07 VITALS
DIASTOLIC BLOOD PRESSURE: 64 MMHG | HEART RATE: 73 BPM | OXYGEN SATURATION: 100 % | BODY MASS INDEX: 26.52 KG/M2 | RESPIRATION RATE: 17 BRPM | WEIGHT: 165 LBS | HEIGHT: 66 IN | TEMPERATURE: 96.6 F | SYSTOLIC BLOOD PRESSURE: 129 MMHG

## 2025-07-07 DIAGNOSIS — M47.816 LUMBAR FACET ARTHROPATHY: ICD-10-CM

## 2025-07-07 LAB — GLUCOSE BLD MANUAL STRIP-MCNC: 201 MG/DL (ref 74–99)

## 2025-07-07 PROCEDURE — 2500000004 HC RX 250 GENERAL PHARMACY W/ HCPCS (ALT 636 FOR OP/ED): Performed by: STUDENT IN AN ORGANIZED HEALTH CARE EDUCATION/TRAINING PROGRAM

## 2025-07-07 PROCEDURE — 64635 DESTROY LUMB/SAC FACET JNT: CPT | Mod: 50 | Performed by: STUDENT IN AN ORGANIZED HEALTH CARE EDUCATION/TRAINING PROGRAM

## 2025-07-07 PROCEDURE — 64636 DESTROY L/S FACET JNT ADDL: CPT | Performed by: STUDENT IN AN ORGANIZED HEALTH CARE EDUCATION/TRAINING PROGRAM

## 2025-07-07 PROCEDURE — 7100000010 HC PHASE TWO TIME - EACH INCREMENTAL 1 MINUTE

## 2025-07-07 PROCEDURE — 82947 ASSAY GLUCOSE BLOOD QUANT: CPT

## 2025-07-07 PROCEDURE — 7100000009 HC PHASE TWO TIME - INITIAL BASE CHARGE

## 2025-07-07 RX ORDER — LIDOCAINE HYDROCHLORIDE 20 MG/ML
INJECTION, SOLUTION EPIDURAL; INFILTRATION; INTRACAUDAL; PERINEURAL AS NEEDED
Status: COMPLETED | OUTPATIENT
Start: 2025-07-07 | End: 2025-07-07

## 2025-07-07 RX ORDER — LIDOCAINE HYDROCHLORIDE 10 MG/ML
INJECTION, SOLUTION EPIDURAL; INFILTRATION; INTRACAUDAL; PERINEURAL AS NEEDED
Status: COMPLETED | OUTPATIENT
Start: 2025-07-07 | End: 2025-07-07

## 2025-07-07 RX ADMIN — LIDOCAINE HYDROCHLORIDE 20 ML: 10 INJECTION, SOLUTION EPIDURAL; INFILTRATION; INTRACAUDAL; PERINEURAL at 10:36

## 2025-07-07 RX ADMIN — LIDOCAINE HYDROCHLORIDE 6 ML: 20 INJECTION, SOLUTION EPIDURAL; INFILTRATION; INTRACAUDAL; PERINEURAL at 10:37

## 2025-07-07 ASSESSMENT — COLUMBIA-SUICIDE SEVERITY RATING SCALE - C-SSRS
2. HAVE YOU ACTUALLY HAD ANY THOUGHTS OF KILLING YOURSELF?: NO
6. HAVE YOU EVER DONE ANYTHING, STARTED TO DO ANYTHING, OR PREPARED TO DO ANYTHING TO END YOUR LIFE?: NO
1. IN THE PAST MONTH, HAVE YOU WISHED YOU WERE DEAD OR WISHED YOU COULD GO TO SLEEP AND NOT WAKE UP?: NO

## 2025-07-07 ASSESSMENT — PAIN - FUNCTIONAL ASSESSMENT
PAIN_FUNCTIONAL_ASSESSMENT: 0-10
PAIN_FUNCTIONAL_ASSESSMENT: 0-10

## 2025-07-07 ASSESSMENT — PAIN SCALES - GENERAL
PAINLEVEL_OUTOF10: 0 - NO PAIN
PAINLEVEL_OUTOF10: 0 - NO PAIN

## 2025-07-07 NOTE — DISCHARGE INSTRUCTIONS
DISCHARGE INSTRUCTIONS FOR INJECTIONS     You underwent a bilateral lumbar medial branch nerve radiofrequency ablation at L4/5, L5/S1 today    Aftermost injections, it is recommended that you relax and limit your activity for the remainder of the day unless you have been told otherwise by your pain physician.  You should not drive a car, operate machinery, or make important legal decisions unless otherwise directed by your pain physician.  You may resume your normal activity, including exercise, tomorrow.      Keep a written pain diary of how much pain relief you experienced following the injection procedure and the length of time of pain relief you experienced pain relief. Following diagnostic injections like medial branch nerve blocks, sacroiliac joint blocks, stellate ganglion injections and other blocks, it is very important you record the specific amount of pain relief you experienced immediately after the injectionand how long it lasted. Your doctor will ask you for this information at your follow up visit.     For all injections, please keep the injection site dry and inspect the site for a couple of days. You may remove the Band-Aid the day of the injection at any time.     Some discomfort, bruising or slight swelling may occur at the injection site. This is not abnormal if it occurs.  If needed you may:    -Take over the counter medication such as Tylenol or Motrin.   -Apply an ice pack for 30 minutes, 2 to 3 times a day for the first 24 hours.     You may shower today; no soaking baths, hot tubs, whirlpools or swimming pools for two days.      If you are given steroids in your injection, it may take 3-5 days for the steroid medication to take effect. You may notice a worsening of your symptoms for 1-2 days after the injection. This is not abnormal.  You may use acetaminophen, ibuprofen, or prescription medication that your doctor may have prescribed for you if you need to do so.     A few common side  effects of steroids include facial flushing, sweating, restlessness, irritability,difficulty sleeping, increase in blood sugar, and increased blood pressure. If you have diabetes, please monitor your blood sugar at least once a day for at least 5 days. If you have poorly controlled high blood pressure, monitoryour blood pressure for at least 2 days and contact your primary care physician if these numbers are unusually high for you.      If you take aspirin or non-steroidal anti-inflammatory drugs (examples are Motrin, Advil, ibuprofen, Naprosyn, Voltaren, Relafen, etc.) you may restart these this evening, but stop taking it 3 days before your next appointment, unless instructed otherwiseby your physician.      You do not need to discontinue non-aspirin-containing pain medications prior to an injection (examples: Celebrex, tramadol, hydrocodone and acetaminophen).      If you take a blood thinning medication (Coumadin, Lovenox, Fragmin,Ticlid, Plavix, Pradaxa, etc.), please discuss this with your primary care physician/cardiologist and your pain physician. These medications MUST be discontinued before you can have an injection safely, without the risk of uncontrolled bleeding. If these medications are not discontinued for an appropriate period of time, you will not be able to receivean injection.      If you are taking Coumadin, please have your INR checked the morning of your procedure and bringthe result to your appointment unless otherwise instructed. If your INR is over 1.2, your injection may need to be rescheduled to avoid uncontrolled bleeding from the needle placement.     Call [facility] at [phone] between 8am-4pm Monday - Friday if you are experiencing the following:    If you received an epidural or spinal injection:    -Headache that doesnot go away with medicine, is worse when sitting or standing up, and is greatly relieved upon lying down.   -Severe pain worse than or different  than your baseline pain.   -Chills or fever (101º F or greater).   -Drainage or signs of infection at the injection site     Go directly to the Emergency Department if you are experiencing the following and received an epidural or spinal injection:   -Abrupt weakness or progressive weakness in your legs that starts after you leave the clinic.   -Abrupt severe or worsening numbness in your legs.   -Inability to urinate after the injection or loss of bowel or bladder control without the urge to defecate or urinate.     If you have a clinical question that cannot wait until your next appointment, please call [phone] between 8am-4pm Monday - Friday or send a [employer] message. We do our best to return all non-emergency messages within 24 hours, Monday - Friday. A nurse or physician will return your message.      If you need to cancel an appointment, please call the scheduling staff at [phone] during normal business hours or leave a message at least 24 hours in advance.     If you are going to be sedated for your next procedure, you MUST have responsible adult who can legally drive accompany you home. You cannot eat or drink for eight hours prior to the planned procedure if you are going to receive sedation. You may take your non-blood thinning medications with a small sip of water.

## 2025-07-07 NOTE — POST-PROCEDURE NOTE
Wheelchair discharge to waiting room by SHREYA Mao , does not need to be called-Per patient. All belongings with patient.

## 2025-07-09 DIAGNOSIS — E11.40 PAINFUL DIABETIC NEUROPATHY (MULTI): ICD-10-CM

## 2025-07-10 PROBLEM — F41.9 ANXIETY: Status: ACTIVE | Noted: 2022-10-17

## 2025-07-10 PROBLEM — J44.9 MODERATE COPD (CHRONIC OBSTRUCTIVE PULMONARY DISEASE) (MULTI): Status: ACTIVE | Noted: 2022-10-29

## 2025-07-10 PROBLEM — I67.4 HYPERTENSIVE ENCEPHALOPATHY: Status: ACTIVE | Noted: 2025-07-10

## 2025-07-10 PROBLEM — E11.9 DIABETES MELLITUS TYPE 2, CONTROLLED, WITHOUT COMPLICATIONS: Status: ACTIVE | Noted: 2019-01-19

## 2025-07-10 PROBLEM — F41.9 MILD ANXIETY: Status: ACTIVE | Noted: 2025-07-10

## 2025-07-10 PROBLEM — M71.21 BAKER'S CYST OF KNEE, RIGHT: Status: ACTIVE | Noted: 2018-04-30

## 2025-07-10 PROBLEM — R13.10 DYSPHAGIA: Status: ACTIVE | Noted: 2025-07-10

## 2025-07-10 PROBLEM — M47.817 LUMBOSACRAL SPONDYLOSIS WITHOUT MYELOPATHY: Status: ACTIVE | Noted: 2025-07-10

## 2025-07-10 PROBLEM — Z86.39 HISTORY OF HYPERCHOLESTEROLEMIA: Status: ACTIVE | Noted: 2025-07-10

## 2025-07-10 PROBLEM — G43.009 MIGRAINE WITHOUT AURA AND RESPONSIVE TO TREATMENT: Status: ACTIVE | Noted: 2025-07-10

## 2025-07-10 PROBLEM — I82.409 DVT (DEEP VENOUS THROMBOSIS) (MULTI): Status: ACTIVE | Noted: 2025-04-14

## 2025-07-10 PROBLEM — M54.2 NECK PAIN: Status: ACTIVE | Noted: 2022-12-25

## 2025-07-10 PROBLEM — E11.69 HYPERLIPIDEMIA ASSOCIATED WITH TYPE 2 DIABETES MELLITUS: Status: ACTIVE | Noted: 2019-01-19

## 2025-07-10 PROBLEM — K92.2 GI BLEED: Status: ACTIVE | Noted: 2019-06-29

## 2025-07-10 PROBLEM — R05.9 COUGH, UNSPECIFIED: Status: ACTIVE | Noted: 2023-05-19

## 2025-07-10 PROBLEM — Z86.79 HISTORY OF HYPERTENSION: Status: ACTIVE | Noted: 2025-07-10

## 2025-07-10 PROBLEM — Z86.16 PERSONAL HISTORY OF COVID-19: Status: ACTIVE | Noted: 2022-12-08

## 2025-07-10 PROBLEM — Z86.39 HISTORY OF TYPE 2 DIABETES MELLITUS: Status: ACTIVE | Noted: 2025-07-10

## 2025-07-10 PROBLEM — R35.0 INCREASED FREQUENCY OF URINATION: Status: ACTIVE | Noted: 2023-06-24

## 2025-07-10 PROBLEM — I12.9 BENIGN HYPERTENSION WITH CKD (CHRONIC KIDNEY DISEASE) STAGE III (MULTI): Status: ACTIVE | Noted: 2019-01-19

## 2025-07-10 PROBLEM — I77.6 VASCULITIS: Status: ACTIVE | Noted: 2025-07-10

## 2025-07-10 PROBLEM — Z86.2 HISTORY OF ANEMIA: Status: ACTIVE | Noted: 2025-07-10

## 2025-07-10 PROBLEM — E78.5 DYSLIPIDEMIA: Status: ACTIVE | Noted: 2025-01-03

## 2025-07-10 PROBLEM — R13.19 ESOPHAGEAL DYSPHAGIA: Status: ACTIVE | Noted: 2025-07-10

## 2025-07-10 PROBLEM — E79.0 HYPERURICEMIA: Status: ACTIVE | Noted: 2025-07-10

## 2025-07-10 PROBLEM — Z85.71 HISTORY OF HODGKIN LYMPHOMA: Status: ACTIVE | Noted: 2022-11-18

## 2025-07-10 PROBLEM — Z99.2 ESRD (END STAGE RENAL DISEASE) ON DIALYSIS (MULTI): Status: ACTIVE | Noted: 2024-03-13

## 2025-07-10 PROBLEM — Z20.822 CONTACT WITH AND (SUSPECTED) EXPOSURE TO COVID-19: Status: ACTIVE | Noted: 2022-10-29

## 2025-07-10 PROBLEM — E78.5 HYPERLIPIDEMIA ASSOCIATED WITH TYPE 2 DIABETES MELLITUS: Status: ACTIVE | Noted: 2019-01-19

## 2025-07-10 PROBLEM — E87.0 HYPERNATREMIA: Status: ACTIVE | Noted: 2020-03-16

## 2025-07-10 PROBLEM — R60.9 EDEMA: Status: ACTIVE | Noted: 2025-07-10

## 2025-07-10 PROBLEM — H90.3 SENSORINEURAL HEARING LOSS, BILATERAL: Status: ACTIVE | Noted: 2018-01-16

## 2025-07-10 PROBLEM — Z86.39 HISTORY OF HYPOTHYROIDISM: Status: ACTIVE | Noted: 2025-07-10

## 2025-07-10 PROBLEM — E11.9 TYPE 2 DIABETES MELLITUS: Status: ACTIVE | Noted: 2022-08-29

## 2025-07-10 PROBLEM — R56.9 SEIZURE (MULTI): Status: ACTIVE | Noted: 2025-07-10

## 2025-07-10 PROBLEM — R40.1 CLOUDED CONSCIOUSNESS: Status: ACTIVE | Noted: 2025-07-10

## 2025-07-10 PROBLEM — R29.818 PARKINSONIAN FEATURES: Status: ACTIVE | Noted: 2020-07-11

## 2025-07-10 PROBLEM — L90.0 LICHEN SCLEROSUS ET ATROPHICUS: Status: ACTIVE | Noted: 2025-07-10

## 2025-07-10 PROBLEM — M62.89 PELVIC FLOOR DYSFUNCTION: Status: ACTIVE | Noted: 2025-07-10

## 2025-07-10 PROBLEM — R20.2 PARESTHESIA: Status: ACTIVE | Noted: 2025-07-10

## 2025-07-10 PROBLEM — N18.30 BENIGN HYPERTENSION WITH CKD (CHRONIC KIDNEY DISEASE) STAGE III (MULTI): Status: ACTIVE | Noted: 2019-01-19

## 2025-07-10 PROBLEM — I95.3 HEMODIALYSIS-ASSOCIATED HYPOTENSION: Status: ACTIVE | Noted: 2025-05-07

## 2025-07-10 PROBLEM — I10 HYPERTENSION, ESSENTIAL: Status: ACTIVE | Noted: 2025-07-10

## 2025-07-10 PROBLEM — N17.9 AKI (ACUTE KIDNEY INJURY): Status: ACTIVE | Noted: 2019-02-25

## 2025-07-10 PROBLEM — N32.89 BLADDER SPASM: Status: ACTIVE | Noted: 2025-07-10

## 2025-07-10 PROBLEM — M79.673 PAIN OF FOOT: Status: ACTIVE | Noted: 2025-07-10

## 2025-07-10 PROBLEM — N39.0 RECURRENT UTI (URINARY TRACT INFECTION): Status: ACTIVE | Noted: 2021-04-12

## 2025-07-10 PROBLEM — L29.9 ITCHING OF EAR: Status: ACTIVE | Noted: 2025-07-10

## 2025-07-10 PROBLEM — G40.109 FOCAL EPILEPSY (MULTI): Status: ACTIVE | Noted: 2025-07-10

## 2025-07-10 PROBLEM — L21.9 SEBORRHEIC DERMATITIS: Status: ACTIVE | Noted: 2025-07-10

## 2025-07-10 PROBLEM — B37.31 CANDIDIASIS OF VAGINA: Status: ACTIVE | Noted: 2023-06-10

## 2025-07-10 PROBLEM — F32.A MILD DEPRESSION: Status: ACTIVE | Noted: 2025-07-10

## 2025-07-10 PROBLEM — G31.84 MCI (MILD COGNITIVE IMPAIRMENT): Status: ACTIVE | Noted: 2025-07-10

## 2025-07-10 PROBLEM — L98.9 CHANGING SKIN LESION: Status: ACTIVE | Noted: 2023-05-11

## 2025-07-10 PROBLEM — E87.22 CHRONIC METABOLIC ACIDOSIS: Status: ACTIVE | Noted: 2023-06-24

## 2025-07-10 PROBLEM — M12.539: Status: ACTIVE | Noted: 2025-07-10

## 2025-07-10 PROBLEM — M47.9 SPINAL OSTEOARTHRITIS: Status: ACTIVE | Noted: 2025-07-10

## 2025-07-10 PROBLEM — M25.539 PAIN IN WRIST: Status: ACTIVE | Noted: 2025-07-10

## 2025-07-10 PROBLEM — R60.0 LOCALIZED EDEMA: Status: ACTIVE | Noted: 2019-04-08

## 2025-07-10 PROBLEM — K21.9 GERD (GASTROESOPHAGEAL REFLUX DISEASE): Status: ACTIVE | Noted: 2017-05-11

## 2025-07-10 PROBLEM — M25.373 CHRONIC INSTABILITY OF ANKLE: Status: ACTIVE | Noted: 2017-12-05

## 2025-07-10 PROBLEM — K62.5 RECTAL BLEEDING: Status: ACTIVE | Noted: 2022-11-01

## 2025-07-10 PROBLEM — N18.6 ESRD (END STAGE RENAL DISEASE) ON DIALYSIS (MULTI): Status: ACTIVE | Noted: 2024-03-13

## 2025-07-10 PROBLEM — R94.5 ABNORMAL LIVER FUNCTION: Status: ACTIVE | Noted: 2025-07-10

## 2025-07-10 PROBLEM — R41.3 MEMORY LOSS: Status: ACTIVE | Noted: 2025-07-10

## 2025-07-10 PROBLEM — G40.919 EPILEPSY, UNSPECIFIED, INTRACTABLE, WITHOUT STATUS EPILEPTICUS: Status: ACTIVE | Noted: 2025-07-10

## 2025-08-06 ENCOUNTER — ANTICOAGULATION - WARFARIN VISIT (OUTPATIENT)
Dept: CARDIOLOGY | Facility: HOSPITAL | Age: 75
End: 2025-08-06
Payer: MEDICARE

## 2025-08-06 NOTE — PROGRESS NOTES
Spoke with patient who states she has had multiple surgeries on her fistula and has been on and off her warfarin. She will come next week

## 2025-09-04 ENCOUNTER — ANTICOAGULATION - WARFARIN VISIT (OUTPATIENT)
Dept: CARDIOLOGY | Facility: HOSPITAL | Age: 75
End: 2025-09-04
Payer: MEDICARE

## (undated) DEVICE — GUIDEWIRE, STIFF SHAFT, ANGLE TIP, .035 DIA, 260 CM,  5 CM TIP"

## (undated) DEVICE — SHEATH, PINNACLE, W/.038 GW 10CM, 5FR INTRODUCER, 2.5 CM DIALATOR

## (undated) DEVICE — PACK, ANGIO P2, CUSTOM, LAKE

## (undated) DEVICE — CATHETER, 5 FR. 65CM, ANGLE TAPER AT TIP

## (undated) DEVICE — MICROINTRODUCER KIT, VSI, 4FR X 40CM, STIFFEN

## (undated) DEVICE — SHEATH, 45CM W/DILATOR, 5FR ST CURVE W/CROSS-CUT VALVE

## (undated) DEVICE — GUIDEWIRE, ANGLE TIP,  .035 DIA, 260 CM, 3 CM TIP"